# Patient Record
Sex: MALE | Race: WHITE | NOT HISPANIC OR LATINO | Employment: FULL TIME | URBAN - NONMETROPOLITAN AREA
[De-identification: names, ages, dates, MRNs, and addresses within clinical notes are randomized per-mention and may not be internally consistent; named-entity substitution may affect disease eponyms.]

---

## 2022-01-27 ENCOUNTER — APPOINTMENT (OUTPATIENT)
Dept: GENERAL RADIOLOGY | Facility: HOSPITAL | Age: 62
End: 2022-01-27

## 2022-01-27 ENCOUNTER — APPOINTMENT (OUTPATIENT)
Dept: CT IMAGING | Facility: HOSPITAL | Age: 62
End: 2022-01-27

## 2022-01-27 ENCOUNTER — HOSPITAL ENCOUNTER (EMERGENCY)
Facility: HOSPITAL | Age: 62
Discharge: HOME | End: 2022-01-27
Admitting: EMERGENCY MEDICINE

## 2022-01-27 VITALS
WEIGHT: 233 LBS | HEIGHT: 69 IN | DIASTOLIC BLOOD PRESSURE: 80 MMHG | SYSTOLIC BLOOD PRESSURE: 141 MMHG | RESPIRATION RATE: 18 BRPM | OXYGEN SATURATION: 97 % | BODY MASS INDEX: 34.51 KG/M2 | HEART RATE: 75 BPM | TEMPERATURE: 99 F

## 2022-01-27 DIAGNOSIS — J18.9 PNEUMONIA OF RIGHT UPPER LOBE DUE TO INFECTIOUS ORGANISM: ICD-10-CM

## 2022-01-27 DIAGNOSIS — R93.7 ABNORMAL CT SCAN, LUMBAR SPINE: ICD-10-CM

## 2022-01-27 DIAGNOSIS — M54.41 CHRONIC MIDLINE LOW BACK PAIN WITH RIGHT-SIDED SCIATICA: Primary | ICD-10-CM

## 2022-01-27 DIAGNOSIS — G89.29 CHRONIC MIDLINE LOW BACK PAIN WITH RIGHT-SIDED SCIATICA: Primary | ICD-10-CM

## 2022-01-27 LAB — SARS-COV-2 RNA PNL SPEC NAA+PROBE: DETECTED

## 2022-01-27 PROCEDURE — 96375 TX/PRO/DX INJ NEW DRUG ADDON: CPT

## 2022-01-27 PROCEDURE — 25010000002 ORPHENADRINE CITRATE PER 60 MG: Performed by: PHYSICIAN ASSISTANT

## 2022-01-27 PROCEDURE — 25010000002 MORPHINE PER 10 MG: Performed by: EMERGENCY MEDICINE

## 2022-01-27 PROCEDURE — 0 LIDOCAINE 1 % SOLUTION: Performed by: PHYSICIAN ASSISTANT

## 2022-01-27 PROCEDURE — 99284 EMERGENCY DEPT VISIT MOD MDM: CPT

## 2022-01-27 PROCEDURE — 87635 SARS-COV-2 COVID-19 AMP PRB: CPT | Performed by: PHYSICIAN ASSISTANT

## 2022-01-27 PROCEDURE — 72131 CT LUMBAR SPINE W/O DYE: CPT

## 2022-01-27 PROCEDURE — 71045 X-RAY EXAM CHEST 1 VIEW: CPT

## 2022-01-27 PROCEDURE — 96374 THER/PROPH/DIAG INJ IV PUSH: CPT

## 2022-01-27 PROCEDURE — 72128 CT CHEST SPINE W/O DYE: CPT

## 2022-01-27 PROCEDURE — 25010000002 ONDANSETRON PER 1 MG: Performed by: PHYSICIAN ASSISTANT

## 2022-01-27 PROCEDURE — 25010000002 DEXAMETHASONE PER 1 MG: Performed by: EMERGENCY MEDICINE

## 2022-01-27 RX ORDER — TRAMADOL HYDROCHLORIDE 50 MG/1
50 TABLET ORAL EVERY 12 HOURS
COMMUNITY
End: 2022-11-22

## 2022-01-27 RX ORDER — LIDOCAINE HYDROCHLORIDE 10 MG/ML
10 INJECTION, SOLUTION INFILTRATION; PERINEURAL ONCE
Status: DISCONTINUED | OUTPATIENT
Start: 2022-01-27 | End: 2022-01-27 | Stop reason: HOSPADM

## 2022-01-27 RX ORDER — DEXAMETHASONE SODIUM PHOSPHATE 4 MG/ML
4 INJECTION, SOLUTION INTRA-ARTICULAR; INTRALESIONAL; INTRAMUSCULAR; INTRAVENOUS; SOFT TISSUE ONCE
Status: COMPLETED | OUTPATIENT
Start: 2022-01-27 | End: 2022-01-27

## 2022-01-27 RX ORDER — SODIUM CHLORIDE 0.9 % (FLUSH) 0.9 %
10 SYRINGE (ML) INJECTION AS NEEDED
Status: DISCONTINUED | OUTPATIENT
Start: 2022-01-27 | End: 2022-01-27 | Stop reason: HOSPADM

## 2022-01-27 RX ORDER — GLIPIZIDE 5 MG/1
5 TABLET ORAL DAILY
COMMUNITY

## 2022-01-27 RX ORDER — AZITHROMYCIN 250 MG/1
TABLET, FILM COATED ORAL
Qty: 6 TABLET | Refills: 0 | Status: SHIPPED | OUTPATIENT
Start: 2022-01-27 | End: 2022-11-22

## 2022-01-27 RX ORDER — HYDROCODONE BITARTRATE AND ACETAMINOPHEN 10; 325 MG/1; MG/1
1 TABLET ORAL ONCE
Status: COMPLETED | OUTPATIENT
Start: 2022-01-27 | End: 2022-01-27

## 2022-01-27 RX ORDER — ATORVASTATIN CALCIUM 20 MG/1
20 TABLET, FILM COATED ORAL DAILY
COMMUNITY

## 2022-01-27 RX ORDER — LOSARTAN POTASSIUM 25 MG/1
25 TABLET ORAL DAILY
COMMUNITY

## 2022-01-27 RX ORDER — HYDROCHLOROTHIAZIDE 12.5 MG/1
12.5 TABLET ORAL DAILY
COMMUNITY

## 2022-01-27 RX ORDER — ONDANSETRON 2 MG/ML
4 INJECTION INTRAMUSCULAR; INTRAVENOUS ONCE
Status: COMPLETED | OUTPATIENT
Start: 2022-01-27 | End: 2022-01-27

## 2022-01-27 RX ORDER — TIZANIDINE 4 MG/1
4 TABLET ORAL EVERY 6 HOURS PRN
Qty: 12 TABLET | Refills: 0 | Status: SHIPPED | OUTPATIENT
Start: 2022-01-27 | End: 2022-11-22

## 2022-01-27 RX ORDER — GABAPENTIN 300 MG/1
300 CAPSULE ORAL 3 TIMES DAILY
COMMUNITY

## 2022-01-27 RX ORDER — LIDOCAINE 50 MG/G
1 PATCH TOPICAL EVERY 24 HOURS
Qty: 6 EACH | Refills: 0 | Status: SHIPPED | OUTPATIENT
Start: 2022-01-27 | End: 2022-11-22

## 2022-01-27 RX ORDER — ORPHENADRINE CITRATE 30 MG/ML
60 INJECTION INTRAMUSCULAR; INTRAVENOUS ONCE
Status: COMPLETED | OUTPATIENT
Start: 2022-01-27 | End: 2022-01-27

## 2022-01-27 RX ORDER — CLONIDINE HYDROCHLORIDE 0.1 MG/1
0.1 TABLET ORAL 2 TIMES DAILY PRN
COMMUNITY

## 2022-01-27 RX ADMIN — SODIUM CHLORIDE 500 ML: 9 INJECTION, SOLUTION INTRAVENOUS at 14:38

## 2022-01-27 RX ADMIN — DEXAMETHASONE SODIUM PHOSPHATE 4 MG: 4 INJECTION, SOLUTION INTRA-ARTICULAR; INTRALESIONAL; INTRAMUSCULAR; INTRAVENOUS; SOFT TISSUE at 14:38

## 2022-01-27 RX ADMIN — MORPHINE SULFATE 4 MG: 4 INJECTION, SOLUTION INTRAMUSCULAR; INTRAVENOUS at 14:37

## 2022-01-27 RX ADMIN — ONDANSETRON 4 MG: 2 INJECTION INTRAMUSCULAR; INTRAVENOUS at 14:37

## 2022-01-27 RX ADMIN — HYDROCODONE BITARTRATE AND ACETAMINOPHEN 1 TABLET: 10; 325 TABLET ORAL at 16:47

## 2022-01-27 RX ADMIN — ORPHENADRINE CITRATE 60 MG: 30 INJECTION INTRAMUSCULAR; INTRAVENOUS at 14:37

## 2022-04-05 ENCOUNTER — DOCUMENTATION (OUTPATIENT)
Dept: CT IMAGING | Facility: HOSPITAL | Age: 62
End: 2022-04-05

## 2022-07-11 ENCOUNTER — DOCUMENTATION (OUTPATIENT)
Dept: CT IMAGING | Facility: HOSPITAL | Age: 62
End: 2022-07-11

## 2022-07-12 ENCOUNTER — TELEPHONE (OUTPATIENT)
Dept: CT IMAGING | Facility: HOSPITAL | Age: 62
End: 2022-07-12

## 2022-07-12 NOTE — TELEPHONE ENCOUNTER
I called the [facility] in [place], [place] and left a message for someone to return my call. I need to touch base with them to see if any f/u imaging has been done. If not, send CXR report to a provider there.

## 2022-07-12 NOTE — TELEPHONE ENCOUNTER
Patient called in and we discussed the findings on CXR from Jan. He did not know the name of the provider that he sees at [facility] in [place], [place]. He said he was having surgery today and would get back to me in a couple of days.

## 2022-10-24 ENCOUNTER — HOSPITAL ENCOUNTER (OUTPATIENT)
Facility: HOSPITAL | Age: 62
Setting detail: SURGERY ADMIT
End: 2022-10-24
Attending: ORTHOPAEDIC SURGERY | Admitting: ORTHOPAEDIC SURGERY

## 2022-11-21 ENCOUNTER — TELEPHONE (OUTPATIENT)
Dept: VASCULAR SURGERY | Facility: CLINIC | Age: 62
End: 2022-11-21

## 2022-11-21 NOTE — TELEPHONE ENCOUNTER
Called to remind the patient of his appt tomorrow with Dr. Dyson.  I had to leave a message with Lelo.  I also gave her the address.

## 2022-11-22 ENCOUNTER — OFFICE VISIT (OUTPATIENT)
Dept: VASCULAR SURGERY | Facility: CLINIC | Age: 62
End: 2022-11-22

## 2022-11-22 ENCOUNTER — HOSPITAL ENCOUNTER (OUTPATIENT)
Dept: GENERAL RADIOLOGY | Facility: HOSPITAL | Age: 62
Discharge: HOME | End: 2022-11-22

## 2022-11-22 ENCOUNTER — PRE-ADMISSION TESTING (OUTPATIENT)
Dept: PREADMISSION TESTING | Facility: HOSPITAL | Age: 62
End: 2022-11-22

## 2022-11-22 VITALS
HEIGHT: 69 IN | BODY MASS INDEX: 31.48 KG/M2 | DIASTOLIC BLOOD PRESSURE: 92 MMHG | HEART RATE: 66 BPM | OXYGEN SATURATION: 100 % | WEIGHT: 212.52 LBS | SYSTOLIC BLOOD PRESSURE: 162 MMHG | RESPIRATION RATE: 16 BRPM

## 2022-11-22 VITALS
BODY MASS INDEX: 31.25 KG/M2 | OXYGEN SATURATION: 98 % | WEIGHT: 211 LBS | HEART RATE: 66 BPM | DIASTOLIC BLOOD PRESSURE: 82 MMHG | SYSTOLIC BLOOD PRESSURE: 118 MMHG

## 2022-11-22 DIAGNOSIS — G89.29 CHRONIC MIDLINE LOW BACK PAIN, UNSPECIFIED WHETHER SCIATICA PRESENT: Primary | ICD-10-CM

## 2022-11-22 DIAGNOSIS — M54.50 CHRONIC MIDLINE LOW BACK PAIN, UNSPECIFIED WHETHER SCIATICA PRESENT: Primary | ICD-10-CM

## 2022-11-22 LAB
ALBUMIN SERPL-MCNC: 4.6 G/DL (ref 3.5–5.2)
ALBUMIN/GLOB SERPL: 1.4 G/DL
ALP SERPL-CCNC: 28 U/L (ref 39–117)
ALT SERPL W P-5'-P-CCNC: 23 U/L (ref 1–41)
ANION GAP SERPL CALCULATED.3IONS-SCNC: 10 MMOL/L (ref 5–15)
APTT PPP: 38.1 SECONDS (ref 24.1–35)
AST SERPL-CCNC: 21 U/L (ref 1–40)
BILIRUB SERPL-MCNC: 0.4 MG/DL (ref 0–1.2)
BILIRUB UR QL STRIP: NEGATIVE
BUN SERPL-MCNC: 23 MG/DL (ref 8–23)
BUN/CREAT SERPL: 20.7 (ref 7–25)
CALCIUM SPEC-SCNC: 10 MG/DL (ref 8.6–10.5)
CHLORIDE SERPL-SCNC: 102 MMOL/L (ref 98–107)
CLARITY UR: CLEAR
CO2 SERPL-SCNC: 28 MMOL/L (ref 22–29)
COLOR UR: YELLOW
CREAT SERPL-MCNC: 1.11 MG/DL (ref 0.76–1.27)
DEPRECATED RDW RBC AUTO: 43.5 FL (ref 37–54)
EGFRCR SERPLBLD CKD-EPI 2021: 75.5 ML/MIN/1.73
ERYTHROCYTE [DISTWIDTH] IN BLOOD BY AUTOMATED COUNT: 12.4 % (ref 12.3–15.4)
GLOBULIN UR ELPH-MCNC: 3.3 GM/DL
GLUCOSE SERPL-MCNC: 140 MG/DL (ref 65–99)
GLUCOSE UR STRIP-MCNC: NEGATIVE MG/DL
HCT VFR BLD AUTO: 44.9 % (ref 37.5–51)
HGB BLD-MCNC: 14.8 G/DL (ref 13–17.7)
HGB UR QL STRIP.AUTO: NEGATIVE
INR PPP: 1 (ref 0.91–1.09)
KETONES UR QL STRIP: NEGATIVE
LEUKOCYTE ESTERASE UR QL STRIP.AUTO: NEGATIVE
MCH RBC QN AUTO: 31.6 PG (ref 26.6–33)
MCHC RBC AUTO-ENTMCNC: 33 G/DL (ref 31.5–35.7)
MCV RBC AUTO: 95.7 FL (ref 79–97)
NITRITE UR QL STRIP: NEGATIVE
PH UR STRIP.AUTO: 5.5 [PH] (ref 5–8)
PLATELET # BLD AUTO: 297 10*3/MM3 (ref 140–450)
PMV BLD AUTO: 10.9 FL (ref 6–12)
POTASSIUM SERPL-SCNC: 4.6 MMOL/L (ref 3.5–5.2)
PROT SERPL-MCNC: 7.9 G/DL (ref 6–8.5)
PROT UR QL STRIP: NEGATIVE
PROTHROMBIN TIME: 12.8 SECONDS (ref 11.9–14.6)
RBC # BLD AUTO: 4.69 10*6/MM3 (ref 4.14–5.8)
SODIUM SERPL-SCNC: 140 MMOL/L (ref 136–145)
SP GR UR STRIP: 1.02 (ref 1–1.03)
UROBILINOGEN UR QL STRIP: NORMAL
WBC NRBC COR # BLD: 9.79 10*3/MM3 (ref 3.4–10.8)

## 2022-11-22 PROCEDURE — 93005 ELECTROCARDIOGRAM TRACING: CPT

## 2022-11-22 PROCEDURE — 85027 COMPLETE CBC AUTOMATED: CPT

## 2022-11-22 PROCEDURE — 99204 OFFICE O/P NEW MOD 45 MIN: CPT | Performed by: SURGERY

## 2022-11-22 PROCEDURE — 36415 COLL VENOUS BLD VENIPUNCTURE: CPT

## 2022-11-22 PROCEDURE — 80053 COMPREHEN METABOLIC PANEL: CPT

## 2022-11-22 PROCEDURE — 71045 X-RAY EXAM CHEST 1 VIEW: CPT

## 2022-11-22 PROCEDURE — 93010 ELECTROCARDIOGRAM REPORT: CPT | Performed by: INTERNAL MEDICINE

## 2022-11-22 PROCEDURE — 85610 PROTHROMBIN TIME: CPT

## 2022-11-22 PROCEDURE — 85730 THROMBOPLASTIN TIME PARTIAL: CPT

## 2022-11-22 PROCEDURE — 81003 URINALYSIS AUTO W/O SCOPE: CPT

## 2022-11-22 RX ORDER — ASPIRIN 81 MG/1
81 TABLET ORAL DAILY
COMMUNITY

## 2022-11-22 NOTE — PROGRESS NOTES
"2022       CHARITO Garcia MD  [address] [place] [id number]    Manuel Moscoso  1960    Chief Complaint   Patient presents with   • NEW PATIENT     KIRBY/CARLITO. (22)       Dear CHARITO Garcia MD:      HPI  I had the pleasure of seeing your patient Manuel Moscoso in the office today.  Thank you kindly for this consultation.  As you recall, Manuel Moscoso is a 62 y.o.  male who you are currently following for chronic back pain.  Manuel Chaparro scheduled for anterior lumbar interbody fusion of L5-S1 with Dr. Garcia on 22.  The patient denies any history of DVT. He has had 2 previous back surgeries.     Past Medical History:   Diagnosis Date   • Allergy to alpha-gal    • Arthritis    • Diabetes mellitus (HCC)    • Hearing loss, left    • History of staph infection    • Hypertension    • Low back pain    • Psoriasis    • Sleep apnea    • Tinnitus    • Vision disturbance     using rx eyedrops       Past Surgical History:   Procedure Laterality Date   • BACK SURGERY      x 2   • CHOLECYSTECTOMY     • COLONOSCOPY     • GANGLION CYST EXCISION Left     wrist   • LIPOMA EXCISION      back       History reviewed. No pertinent family history.    Social History     Socioeconomic History   • Marital status:    Tobacco Use   • Smoking status: Former     Types: Cigarettes     Quit date:      Years since quittin.9   • Smokeless tobacco: Never   Vaping Use   • Vaping Use: Never used   Substance and Sexual Activity   • Alcohol use: Yes     Comment: rarely   • Drug use: Never       Allergies   Allergen Reactions   • Alpha-Gal Nausea And Vomiting   • Penicillins Unknown - Low Severity     STATES HE \"HAS NO IDEA\" AND THAT IT WAS A CHILDHOOD ALLERGY       Current Outpatient Medications   Medication Instructions   • aspirin 81 mg, Oral, Daily   • atorvastatin (LIPITOR) 20 mg, Oral, Daily   • cloNIDine (CATAPRES) 0.1 mg, Oral, 3 Times Daily   • etanercept (ENBREL) 50 " mg, Subcutaneous, Weekly   • gabapentin (NEURONTIN) 300 mg, Oral, 3 Times Daily   • glipizide (GLUCOTROL) 5 mg, Oral, Daily   • hydroCHLOROthiazide (HYDRODIURIL) 12.5 mg, Oral, Daily   • losartan (COZAAR) 25 mg, Oral, Daily   • metFORMIN (GLUCOPHAGE) 1,000 mg, Oral, 2 Times Daily With Meals         Review of Systems   Constitutional: Negative.    HENT: Negative.    Eyes: Negative.    Respiratory: Negative.    Cardiovascular: Negative.    Gastrointestinal: Negative.    Endocrine: Negative.    Genitourinary: Negative.    Musculoskeletal: Positive for back pain.   Skin: Negative.    Allergic/Immunologic: Negative.    Neurological: Negative.    Hematological: Negative.    Psychiatric/Behavioral: Negative.    All other systems reviewed and are negative.      /82   Pulse 66   Wt 95.7 kg (211 lb)   SpO2 98%   BMI 31.25 kg/m²   Physical Exam  Vitals and nursing note reviewed.   Constitutional:       Appearance: Normal appearance. He is well-developed. He is obese.   HENT:      Head: Normocephalic and atraumatic.   Eyes:      General: No scleral icterus.     Pupils: Pupils are equal, round, and reactive to light.   Neck:      Thyroid: No thyromegaly.      Vascular: No carotid bruit or JVD.   Cardiovascular:      Rate and Rhythm: Normal rate and regular rhythm.      Pulses:           Carotid pulses are 2+ on the right side and 2+ on the left side.       Femoral pulses are 2+ on the right side and 2+ on the left side.       Popliteal pulses are 2+ on the right side and 2+ on the left side.        Dorsalis pedis pulses are 2+ on the right side and 2+ on the left side.        Posterior tibial pulses are 2+ on the right side and 2+ on the left side.      Heart sounds: Normal heart sounds.   Pulmonary:      Effort: Pulmonary effort is normal.      Breath sounds: Normal breath sounds.   Abdominal:      General: Bowel sounds are normal. There is no distension or abdominal bruit.      Palpations: Abdomen is soft. There is  no mass.      Tenderness: There is no abdominal tenderness.   Musculoskeletal:         General: Normal range of motion.      Cervical back: Neck supple.      Comments: Lumbar pain   Lymphadenopathy:      Cervical: No cervical adenopathy.   Skin:     General: Skin is warm and dry.   Neurological:      Mental Status: He is alert and oriented to person, place, and time.      Cranial Nerves: No cranial nerve deficit.      Sensory: No sensory deficit.   Psychiatric:         Mood and Affect: Mood normal.         Behavior: Behavior normal.         Thought Content: Thought content normal.         Judgment: Judgment normal.         XR Chest 1 View    Result Date: 11/22/2022  Narrative: EXAMINATION:  XR CHEST 1 VW-  11/22/2022 9:19 AM CST  HISTORY: Preadmission testing. Hypertension.  COMPARISON: 1/27/2022.  TECHNIQUE: Single view AP image.  FINDINGS:  The lungs are expanded bilaterally and clear. The pleural spaces are clear. Heart size is normal. There are degenerative changes of the spine and shoulders. No acute bony abnormality is seen.      Impression: No active disease is seen.   This report was finalized on 11/22/2022 09:38 by Dr. Luis Duncan MD.      There is no problem list on file for this patient.        ICD-10-CM ICD-9-CM   1. Chronic midline low back pain, unspecified whether sciatica present  M54.50 724.2    G89.29 338.29         Plan: After thoroughly evaluating Manuel Moscoso, I believe the best course of action is to proceed with anterior lumbar interbody fusion of L5-S1.  Risks of ALIF were discussed and include, but are not limited to, bleeding, infection, nerve damage, vessel damage, retrograde ejaculation, bowel damage, ureteral damage, DVT, MI, stroke, and death.  The patient understands these risks and wishes to proceed with procedure.  The patient can continue taking their current medication regimen as previously planned.  This was all discussed in full with complete understanding.    Thank you  for allowing me to participate in the care of your patient.  Please do not hesitate with any questions or concerns.  I will keep you aware of any further encounters with Manuel Moscoso.        Sincerely yours,         Simba Dyson, DO

## 2022-11-22 NOTE — DISCHARGE INSTRUCTIONS
Before you come to the hospital        Arrival time: AS DIRECTED BY OFFICE     YOU MAY TAKE THE FOLLOWING MEDICATION(S) THE MORNING OF SURGERY WITH A SIP OF WATER: Gabapentin    ***Hold Losartan for 24 hours prior to surgery***           ALL OTHER HOME MEDICATION CHECK WITH YOUR PHYSICIAN (especially if you are taking diabetes medicines or blood thinners)    Do not take any Erectile Dysfunction medications (EX: CIALIS, VIAGRA) 24 hours prior to surgery.      If you were given and instructed to use a germ- killing soap, use as directed the night before surgery and again the morning of surgery or as directed by your surgeon.    (See attached information for How to Use Chlorhexidine for Bathing if applicable.)            Eating and drinking restrictions prior to scheduled arrival time    2 Hours before arrival time STOP   Drinking Clear liquids (water, apple juice-no pulp)     6 Hours before arrival time STOP   Milk or drinks that contain milk, full liquids    6 Hours before arrival time STOP   Light meals or foods, such as toast or cereal    8 Hours before arrival time STOP   Heavy foods, such as meat, fried foods, or fatty foods    (It is extremely important that you follow these guidelines to prevent delay or cancelation of your procedure)     Clear Liquids  Water and flavored water                                                                      Clear Fruit juices, such as cranberry juice and apple juice.  Black coffee (NO cream of any kind, including powdered).  Plain tea  Clear bouillon or broth.  Flavored gelatin.  Soda.  Gatorade or Powerade.  Full liquid examples  Juices that have pulp.  Frozen ice pops that contain fruit pieces.  Coffee with creamer  Milk.  Yogurt.                MANAGING PAIN AFTER SURGERY    We know you are probably wondering what your pain will be like after surgery.  Following surgery it is unrealistic to expect you will not have pain.   Pain is how our bodies let us know that  something is wrong or cautions us to be careful.  That said, our goal is to make your pain tolerable.    Methods we may use to treat your pain include (oral or IV medications, PCAs, epidurals, nerve blocks, etc.)   While some procedures require IV pain medications for a short time after surgery, transitioning to pain medications by mouth allows for better management of pain.   Your nurse will encourage you to take oral pain medications whenever possible.  IV medications work almost immediately, but only last a short while.  Taking medications by mouth allows for a more constant level of medication in your blood stream for a longer period of time.      Once your pain is out of control it is harder to get back under control.  It is important you are aware when your next dose of pain medication is due.  If you are admitted, your nurse may write the time of your next dose on the white board in your room to help you remember.      We are interested in your pain and encourage you to inform us about aggravating factors during your visit.   Many times a simple repositioning every few hours can make a big difference.    If your physician says it is okay, do not let your pain prevent you from getting out of bed. Be sure to call your nurse for assistance prior to getting up so you do not fall.      Before surgery, please decide your tolerable pain goal.  These faces help describe the pain ratings we use on a 0-10 scale.   Be prepared to tell us your goal and whether or not you take pain or anxiety medications at home.          Preparing for Surgery  Preparing for surgery is an important part of your care. It can make things go more smoothly and help you avoid complications. The steps leading up to surgery may vary among hospitals. Follow all instructions given to you by your health care providers. Ask questions if you do not understand something. Talk about any concerns that you have.  Here are some questions to consider  asking before your surgery:  If my surgery is not an emergency (is elective), when would be the best time to have the surgery?  What arrangements do I need to make for work, home, or school?  What will my recovery be like? How long will it be before I can return to normal activities?  Will I need to prepare my home? Will I need to arrange care for me or my children?  Should I expect to have pain after surgery? What are my pain management options? Are there nonmedical options that I can try for pain?  Tell a health care provider about:  Any allergies you have.  All medicines you are taking, including vitamins, herbs, eye drops, creams, and over-the-counter medicines.  Any problems you or family members have had with anesthetic medicines.  Any blood disorders you have.  Any surgeries you have had.  Any medical conditions you have.  Whether you are pregnant or may be pregnant.  What are the risks?  The risks and complications of surgery depend on the specific procedure that you have. Discuss all the risks with your health care providers before your surgery. Ask about common surgical complications, which may include:  Infection.  Bleeding or a need for blood replacement (transfusion).  Allergic reactions to medicines.  Damage to surrounding nerves, tissues, or structures.  A blood clot.  Scarring.  Failure of the surgery to correct the problem.  Follow these instructions before the procedure:  Several days or weeks before your procedure  You may have a physical exam by your primary health care provider to make sure it is safe for you to have surgery.  You may have testing. This may include a chest X-ray, blood and urine tests, electrocardiogram (ECG), or other testing.  Ask your health care provider about:  Changing or stopping your regular medicines. This is especially important if you are taking diabetes medicines or blood thinners.  Taking medicines such as aspirin and ibuprofen. These medicines can thin your blood.  Do not take these medicines unless your health care provider tells you to take them.  Taking over-the-counter medicines, vitamins, herbs, and supplements.  Do not use any products that contain nicotine or tobacco, such as cigarettes and e-cigarettes. If you need help quitting, ask your health care provider.  Avoid alcohol.  Ask your health care provider if there are exercises you can do to prepare for surgery.  Eat a healthy diet.   Plan to have someone take you home from the hospital or clinic.  Plan to have a responsible adult care for you for at least 24 hours after you leave the hospital or clinic. This is important.  The day before your procedure  You may be given antibiotic medicine to take by mouth to help prevent infection. Take it as told by your health care provider.  You may be asked to shower with a germ-killing soap.  Follow instructions from your health care provider about eating and drinking restrictions. This includes gum, mints and hard candy.  Pack comfortable clothes according to your procedure.   The day of your procedure  You may need to take another shower with a germ-killing soap before you leave home in the morning.  With a small sip of water, take only the medicines that you are told to take.  Remove all jewelry including rings.   Leave anything you consider valuable at home except hearing aids if needed.  Do not wear any makeup, nail polish, powder, deodorant, lotion, hair accessories, or anything on your skin or body except your clothes.  If you will be staying in the hospital, bring a case to hold your glasses, contacts, or dentures. You may also want to bring your robe and non-skid footwear.  If you wear oxygen at home, bring it with you the day of surgery.  If instructed by your health care provider, bring your sleep apnea device with you on the day of your surgery (if this applies to you).  You may want to leave your suitcase and sleep apnea device in the car until after surgery.    Arrive at the hospital as scheduled.  Bring a friend or family member with you who can help to answer questions and be present while you meet with your health care provider.  At the hospital  When you arrive at the hospital:  Go to registration located at the main entrance of the hospital. You will be registered and given a beeper and a sticker sheet. Take the stickers to the Outpatient nurses desk and place in the black tray. This is to notify staff that you have arrived. Then return to the lobby to wait.   When your beeper lights up and vibrates proceed through the double doors, under the stairs, and a member of the Outpatient Surgery staff will escort you to your preoperative room.  You may have to wear compression sleeves. These help to prevent blood clots and reduce swelling in your legs.  An IV may be inserted into one of your veins.              In the operating room, you may be given one or more of the following:        A medicine to help you relax (sedative).        A medicine to numb the area (local anesthetic).        A medicine to make you fall asleep (general anesthetic).        A medicine that is injected into an area of your body to numb everything below the                      injection site (regional anesthetic).  You may be given an antibiotic through your IV to help prevent infection.  Your surgical site will be marked or identified.    Contact a health care provider if you:  Develop a fever of more than 100.4°F (38°C) or other feelings of illness during the 48 hours before your surgery.  Have symptoms that get worse.  Have questions or concerns about your surgery.  Summary  Preparing for surgery can make the procedure go more smoothly and lower your risk of complications.  Before surgery, make a list of questions and concerns to discuss with your surgeon. Ask about the risks and possible complications.  In the days or weeks before your surgery, follow all instructions from your health care  provider. You may need to stop smoking, avoid alcohol, follow eating restrictions, and change or stop your regular medicines.  Contact your surgeon if you develop a fever or other signs of illness during the few days before your surgery.  This information is not intended to replace advice given to you by your health care provider. Make sure you discuss any questions you have with your health care provider.  Document Revised: 12/21/2018 Document Reviewed: 10/23/2018  [employer]vier Patient Education © 2021 [employer]

## 2022-11-24 LAB
QT INTERVAL: 442 MS
QTC INTERVAL: 444 MS

## 2022-12-18 ENCOUNTER — ANESTHESIA EVENT (OUTPATIENT)
Dept: PERIOP | Facility: HOSPITAL | Age: 62
DRG: 455 | End: 2022-12-18
Payer: COMMERCIAL

## 2022-12-19 ENCOUNTER — ANESTHESIA (OUTPATIENT)
Dept: PERIOP | Facility: HOSPITAL | Age: 62
DRG: 455 | End: 2022-12-19
Payer: COMMERCIAL

## 2022-12-19 ENCOUNTER — APPOINTMENT (OUTPATIENT)
Dept: GENERAL RADIOLOGY | Facility: HOSPITAL | Age: 62
DRG: 455 | End: 2022-12-19
Payer: COMMERCIAL

## 2022-12-19 ENCOUNTER — HOSPITAL ENCOUNTER (INPATIENT)
Facility: HOSPITAL | Age: 62
LOS: 5 days | Discharge: HOME | DRG: 455 | End: 2022-12-24
Attending: ORTHOPAEDIC SURGERY | Admitting: ORTHOPAEDIC SURGERY
Payer: COMMERCIAL

## 2022-12-19 ENCOUNTER — ANESTHESIA EVENT (OUTPATIENT)
Dept: PERIOP | Facility: HOSPITAL | Age: 62
DRG: 455 | End: 2022-12-19
Payer: COMMERCIAL

## 2022-12-19 DIAGNOSIS — M54.50 CHRONIC BILATERAL LOW BACK PAIN WITHOUT SCIATICA: ICD-10-CM

## 2022-12-19 DIAGNOSIS — M51.9 LUMBOSACRAL DISC DISEASE: ICD-10-CM

## 2022-12-19 DIAGNOSIS — M54.40 LUMBAGO OF MULTIPLE SITES IN SPINE WITH SCIATICA: Primary | ICD-10-CM

## 2022-12-19 DIAGNOSIS — G89.29 CHRONIC BILATERAL LOW BACK PAIN WITHOUT SCIATICA: ICD-10-CM

## 2022-12-19 DIAGNOSIS — Z78.9 DECREASED ACTIVITIES OF DAILY LIVING (ADL): ICD-10-CM

## 2022-12-19 DIAGNOSIS — Z74.09 IMPAIRED MOBILITY: ICD-10-CM

## 2022-12-19 PROBLEM — I10 ESSENTIAL HYPERTENSION: Chronic | Status: ACTIVE | Noted: 2022-12-19

## 2022-12-19 PROBLEM — E11.65 TYPE 2 DIABETES MELLITUS WITH HYPERGLYCEMIA, WITHOUT LONG-TERM CURRENT USE OF INSULIN: Status: ACTIVE | Noted: 2022-12-19

## 2022-12-19 PROBLEM — K59.03 DRUG-INDUCED CONSTIPATION: Status: ACTIVE | Noted: 2022-12-19

## 2022-12-19 LAB
ABO GROUP BLD: NORMAL
BLD GP AB SCN SERPL QL: NEGATIVE
GLUCOSE BLDC GLUCOMTR-MCNC: 106 MG/DL (ref 70–130)
GLUCOSE BLDC GLUCOMTR-MCNC: 139 MG/DL (ref 70–130)
RH BLD: POSITIVE
T&S EXPIRATION DATE: NORMAL

## 2022-12-19 PROCEDURE — 76000 FLUOROSCOPY <1 HR PHYS/QHP: CPT

## 2022-12-19 PROCEDURE — C1713 ANCHOR/SCREW BN/BN,TIS/BN: HCPCS | Performed by: ORTHOPAEDIC SURGERY

## 2022-12-19 PROCEDURE — 4A11X4G MONITORING OF PERIPHERAL NERVOUS ELECTRICAL ACTIVITY, INTRAOPERATIVE, EXTERNAL APPROACH: ICD-10-PCS | Performed by: ORTHOPAEDIC SURGERY

## 2022-12-19 PROCEDURE — 97165 OT EVAL LOW COMPLEX 30 MIN: CPT | Performed by: OCCUPATIONAL THERAPIST

## 2022-12-19 PROCEDURE — 82962 GLUCOSE BLOOD TEST: CPT

## 2022-12-19 PROCEDURE — 25010000002 MIDAZOLAM PER 1 MG: Performed by: ANESTHESIOLOGY

## 2022-12-19 PROCEDURE — 01NB0ZZ RELEASE LUMBAR NERVE, OPEN APPROACH: ICD-10-PCS | Performed by: ORTHOPAEDIC SURGERY

## 2022-12-19 PROCEDURE — 25010000002 DEXAMETHASONE PER 1 MG: Performed by: NURSE ANESTHETIST, CERTIFIED REGISTERED

## 2022-12-19 PROCEDURE — 0SG30A0 FUSION OF LUMBOSACRAL JOINT WITH INTERBODY FUSION DEVICE, ANTERIOR APPROACH, ANTERIOR COLUMN, OPEN APPROACH: ICD-10-PCS | Performed by: ORTHOPAEDIC SURGERY

## 2022-12-19 PROCEDURE — 25010000002 FENTANYL CITRATE (PF) 250 MCG/5ML SOLUTION: Performed by: NURSE ANESTHETIST, CERTIFIED REGISTERED

## 2022-12-19 PROCEDURE — 0SB40ZZ EXCISION OF LUMBOSACRAL DISC, OPEN APPROACH: ICD-10-PCS | Performed by: ORTHOPAEDIC SURGERY

## 2022-12-19 PROCEDURE — 86900 BLOOD TYPING SEROLOGIC ABO: CPT | Performed by: ORTHOPAEDIC SURGERY

## 2022-12-19 PROCEDURE — 25010000002 PROPOFOL 10 MG/ML EMULSION: Performed by: NURSE ANESTHETIST, CERTIFIED REGISTERED

## 2022-12-19 PROCEDURE — 22558 ARTHRD ANT NTRBD MIN DSC LUM: CPT | Performed by: SURGERY

## 2022-12-19 PROCEDURE — 25010000002 FENTANYL CITRATE (PF) 100 MCG/2ML SOLUTION: Performed by: NURSE ANESTHETIST, CERTIFIED REGISTERED

## 2022-12-19 PROCEDURE — 74018 RADEX ABDOMEN 1 VIEW: CPT

## 2022-12-19 PROCEDURE — 72100 X-RAY EXAM L-S SPINE 2/3 VWS: CPT

## 2022-12-19 PROCEDURE — 25010000002 CEFAZOLIN PER 500 MG: Performed by: ORTHOPAEDIC SURGERY

## 2022-12-19 PROCEDURE — 25010000002 ONDANSETRON PER 1 MG: Performed by: NURSE ANESTHETIST, CERTIFIED REGISTERED

## 2022-12-19 PROCEDURE — C1765 ADHESION BARRIER: HCPCS | Performed by: ORTHOPAEDIC SURGERY

## 2022-12-19 PROCEDURE — 86901 BLOOD TYPING SEROLOGIC RH(D): CPT | Performed by: ORTHOPAEDIC SURGERY

## 2022-12-19 PROCEDURE — 0WJH0ZZ INSPECTION OF RETROPERITONEUM, OPEN APPROACH: ICD-10-PCS | Performed by: SURGERY

## 2022-12-19 PROCEDURE — 97162 PT EVAL MOD COMPLEX 30 MIN: CPT

## 2022-12-19 PROCEDURE — 86850 RBC ANTIBODY SCREEN: CPT | Performed by: ORTHOPAEDIC SURGERY

## 2022-12-19 DEVICE — ALIF SCREW, 6.0MM X 30MM
Type: IMPLANTABLE DEVICE | Site: SPINE LUMBAR | Status: FUNCTIONAL
Brand: ASPIDA

## 2022-12-19 DEVICE — LIGACLIP MCA MULTIPLE CLIP APPLIERS, 20 SMALL CLIPS
Type: IMPLANTABLE DEVICE | Site: ABDOMEN | Status: FUNCTIONAL
Brand: LIGACLIP

## 2022-12-19 DEVICE — SCRW BRIGADE XLRF 4.5X25MM: Type: IMPLANTABLE DEVICE | Site: SPINE LUMBAR | Status: FUNCTIONAL

## 2022-12-19 DEVICE — IMPLANTABLE DEVICE: Type: IMPLANTABLE DEVICE | Site: SPINE LUMBAR | Status: FUNCTIONAL

## 2022-12-19 DEVICE — HEMOST ABS SURGIFOAM SZ100 8X12 10MM: Type: IMPLANTABLE DEVICE | Site: SPINE LUMBAR | Status: FUNCTIONAL

## 2022-12-19 DEVICE — VERSAWRAP IS AN ABSORBABLE IMPLANT (DEVICE), DESIGNED TO SERVE AS AN INTERFACE BETWEEN TARGET TISSUES AND SURROUNDING TISSUES TO PROVIDE A NON-CONSTRICTING, PROTECTIVE ENCASEMENT. VERSAWRAP CONSISTS OF A CLEAR SHEET AND A WETTING SOLUTION. THE CLEAR SHEET IS A THIN MEMBRANE OF CROSSLINKED CALCIUM ALGINATE AND GLYCOSAMINOGLYCAN. VERSAWRAP SHEET IS EASY TO HANDLE, CONFORMABLE, AND IS DESIGNED FOR PLACEMENT UNDER, AROUND, OR OVER INJURED TISSUES AND/OR SURROUNDING TISSUES. VERSAWRAP SHEET IS SUPPLIED STERILE, NON-PYROGENIC, FOR SINGLE USE, IN DOUBLE PEEL POUCHES. THE VERSAWRAP SOLUTION IS APPLIED TO THE SHEET TO RENDER THE SHEET A GELATINOUS, TISSUE ADHERENT LAYER (GEL IN SITU). THE AQUEOUS CITRATE SOLUTION IS PROVIDED STERILE, NON-PYROGENIC, FOR SINGLE USE, IN A DROPPER, PACKAGED IN A DOUBLE PEEL POUCH.
Type: IMPLANTABLE DEVICE | Site: SPINE LUMBAR | Status: FUNCTIONAL
Brand: VERSAWRAP

## 2022-12-19 DEVICE — PUTTY GRFT BONE CATALYST NANOSYNTHETIC 10CC: Type: IMPLANTABLE DEVICE | Site: SPINE LUMBAR | Status: FUNCTIONAL

## 2022-12-19 DEVICE — KT HEMOST ABS SURGIFOAM PORCN 1GRAM: Type: IMPLANTABLE DEVICE | Site: SPINE LUMBAR | Status: FUNCTIONAL

## 2022-12-19 DEVICE — LIGACLIP MCA MULTIPLE CLIP APPLIERS, 30 MEDIUM CLIPS
Type: IMPLANTABLE DEVICE | Site: ABDOMEN | Status: FUNCTIONAL
Brand: LIGACLIP

## 2022-12-19 DEVICE — 16MM SACRAL PLATE
Type: IMPLANTABLE DEVICE | Site: SPINE LUMBAR | Status: FUNCTIONAL
Brand: ASPIDA

## 2022-12-19 RX ORDER — SODIUM CHLORIDE, SODIUM LACTATE, POTASSIUM CHLORIDE, CALCIUM CHLORIDE 600; 310; 30; 20 MG/100ML; MG/100ML; MG/100ML; MG/100ML
100 INJECTION, SOLUTION INTRAVENOUS CONTINUOUS
Status: DISCONTINUED | OUTPATIENT
Start: 2022-12-19 | End: 2022-12-19

## 2022-12-19 RX ORDER — FLUMAZENIL 0.1 MG/ML
0.2 INJECTION INTRAVENOUS AS NEEDED
Status: DISCONTINUED | OUTPATIENT
Start: 2022-12-19 | End: 2022-12-19 | Stop reason: HOSPADM

## 2022-12-19 RX ORDER — FENTANYL CITRATE 50 UG/ML
INJECTION, SOLUTION INTRAMUSCULAR; INTRAVENOUS AS NEEDED
Status: DISCONTINUED | OUTPATIENT
Start: 2022-12-19 | End: 2022-12-19 | Stop reason: SURG

## 2022-12-19 RX ORDER — SODIUM CHLORIDE, SODIUM LACTATE, POTASSIUM CHLORIDE, CALCIUM CHLORIDE 600; 310; 30; 20 MG/100ML; MG/100ML; MG/100ML; MG/100ML
1000 INJECTION, SOLUTION INTRAVENOUS CONTINUOUS
Status: DISCONTINUED | OUTPATIENT
Start: 2022-12-19 | End: 2022-12-19

## 2022-12-19 RX ORDER — SODIUM CHLORIDE 0.9 % (FLUSH) 0.9 %
3 SYRINGE (ML) INJECTION EVERY 12 HOURS SCHEDULED
Status: DISCONTINUED | OUTPATIENT
Start: 2022-12-19 | End: 2022-12-24 | Stop reason: HOSPADM

## 2022-12-19 RX ORDER — SODIUM CHLORIDE 9 MG/ML
40 INJECTION, SOLUTION INTRAVENOUS AS NEEDED
Status: DISCONTINUED | OUTPATIENT
Start: 2022-12-19 | End: 2022-12-19 | Stop reason: HOSPADM

## 2022-12-19 RX ORDER — PROPOFOL 10 MG/ML
VIAL (ML) INTRAVENOUS AS NEEDED
Status: DISCONTINUED | OUTPATIENT
Start: 2022-12-19 | End: 2022-12-19 | Stop reason: SURG

## 2022-12-19 RX ORDER — SODIUM CHLORIDE 0.9 % (FLUSH) 0.9 %
10 SYRINGE (ML) INJECTION EVERY 12 HOURS SCHEDULED
Status: DISCONTINUED | OUTPATIENT
Start: 2022-12-19 | End: 2022-12-19 | Stop reason: HOSPADM

## 2022-12-19 RX ORDER — ONDANSETRON 2 MG/ML
4 INJECTION INTRAMUSCULAR; INTRAVENOUS EVERY 6 HOURS PRN
Status: DISCONTINUED | OUTPATIENT
Start: 2022-12-19 | End: 2022-12-24 | Stop reason: HOSPADM

## 2022-12-19 RX ORDER — LIDOCAINE HYDROCHLORIDE 10 MG/ML
0.5 INJECTION, SOLUTION EPIDURAL; INFILTRATION; INTRACAUDAL; PERINEURAL ONCE AS NEEDED
Status: DISCONTINUED | OUTPATIENT
Start: 2022-12-19 | End: 2022-12-19 | Stop reason: HOSPADM

## 2022-12-19 RX ORDER — SODIUM CHLORIDE 0.9 % (FLUSH) 0.9 %
3 SYRINGE (ML) INJECTION EVERY 12 HOURS SCHEDULED
Status: DISCONTINUED | OUTPATIENT
Start: 2022-12-19 | End: 2022-12-19 | Stop reason: HOSPADM

## 2022-12-19 RX ORDER — LABETALOL HYDROCHLORIDE 5 MG/ML
5 INJECTION, SOLUTION INTRAVENOUS
Status: DISCONTINUED | OUTPATIENT
Start: 2022-12-19 | End: 2022-12-19 | Stop reason: HOSPADM

## 2022-12-19 RX ORDER — GLIPIZIDE 5 MG/1
5 TABLET ORAL DAILY
Status: DISCONTINUED | OUTPATIENT
Start: 2022-12-19 | End: 2022-12-20

## 2022-12-19 RX ORDER — DROPERIDOL 2.5 MG/ML
0.62 INJECTION, SOLUTION INTRAMUSCULAR; INTRAVENOUS ONCE AS NEEDED
Status: DISCONTINUED | OUTPATIENT
Start: 2022-12-19 | End: 2022-12-19 | Stop reason: HOSPADM

## 2022-12-19 RX ORDER — HYDROMORPHONE HYDROCHLORIDE 1 MG/ML
0.5 INJECTION, SOLUTION INTRAMUSCULAR; INTRAVENOUS; SUBCUTANEOUS
Status: DISCONTINUED | OUTPATIENT
Start: 2022-12-19 | End: 2022-12-19 | Stop reason: HOSPADM

## 2022-12-19 RX ORDER — SODIUM CHLORIDE 0.9 % (FLUSH) 0.9 %
3 SYRINGE (ML) INJECTION AS NEEDED
Status: DISCONTINUED | OUTPATIENT
Start: 2022-12-19 | End: 2022-12-19 | Stop reason: HOSPADM

## 2022-12-19 RX ORDER — MAGNESIUM HYDROXIDE 1200 MG/15ML
LIQUID ORAL AS NEEDED
Status: DISCONTINUED | OUTPATIENT
Start: 2022-12-19 | End: 2022-12-19 | Stop reason: HOSPADM

## 2022-12-19 RX ORDER — SODIUM CHLORIDE 9 MG/ML
INJECTION, SOLUTION INTRAVENOUS AS NEEDED
Status: DISCONTINUED | OUTPATIENT
Start: 2022-12-19 | End: 2022-12-19 | Stop reason: HOSPADM

## 2022-12-19 RX ORDER — ONDANSETRON 2 MG/ML
INJECTION INTRAMUSCULAR; INTRAVENOUS AS NEEDED
Status: DISCONTINUED | OUTPATIENT
Start: 2022-12-19 | End: 2022-12-19 | Stop reason: SURG

## 2022-12-19 RX ORDER — GABAPENTIN 300 MG/1
300 CAPSULE ORAL 3 TIMES DAILY
Status: DISCONTINUED | OUTPATIENT
Start: 2022-12-19 | End: 2022-12-24 | Stop reason: HOSPADM

## 2022-12-19 RX ORDER — SODIUM CHLORIDE 0.9 % (FLUSH) 0.9 %
3-10 SYRINGE (ML) INJECTION AS NEEDED
Status: DISCONTINUED | OUTPATIENT
Start: 2022-12-19 | End: 2022-12-19 | Stop reason: HOSPADM

## 2022-12-19 RX ORDER — AMOXICILLIN 250 MG
1 CAPSULE ORAL 2 TIMES DAILY
Status: DISCONTINUED | OUTPATIENT
Start: 2022-12-19 | End: 2022-12-24 | Stop reason: HOSPADM

## 2022-12-19 RX ORDER — KETAMINE HCL IN NACL, ISO-OSM 100MG/10ML
SYRINGE (ML) INJECTION AS NEEDED
Status: DISCONTINUED | OUTPATIENT
Start: 2022-12-19 | End: 2022-12-19 | Stop reason: SURG

## 2022-12-19 RX ORDER — SODIUM CHLORIDE 0.9 % (FLUSH) 0.9 %
10 SYRINGE (ML) INJECTION AS NEEDED
Status: DISCONTINUED | OUTPATIENT
Start: 2022-12-19 | End: 2022-12-19 | Stop reason: HOSPADM

## 2022-12-19 RX ORDER — OXYCODONE AND ACETAMINOPHEN 10; 325 MG/1; MG/1
1 TABLET ORAL ONCE AS NEEDED
Status: DISCONTINUED | OUTPATIENT
Start: 2022-12-19 | End: 2022-12-19 | Stop reason: HOSPADM

## 2022-12-19 RX ORDER — SODIUM CHLORIDE 9 MG/ML
100 INJECTION, SOLUTION INTRAVENOUS CONTINUOUS
Status: DISCONTINUED | OUTPATIENT
Start: 2022-12-19 | End: 2022-12-22

## 2022-12-19 RX ORDER — ONDANSETRON 2 MG/ML
4 INJECTION INTRAMUSCULAR; INTRAVENOUS ONCE AS NEEDED
Status: DISCONTINUED | OUTPATIENT
Start: 2022-12-19 | End: 2022-12-19 | Stop reason: HOSPADM

## 2022-12-19 RX ORDER — DEXAMETHASONE SODIUM PHOSPHATE 4 MG/ML
INJECTION, SOLUTION INTRA-ARTICULAR; INTRALESIONAL; INTRAMUSCULAR; INTRAVENOUS; SOFT TISSUE AS NEEDED
Status: DISCONTINUED | OUTPATIENT
Start: 2022-12-19 | End: 2022-12-19 | Stop reason: SURG

## 2022-12-19 RX ORDER — ATORVASTATIN CALCIUM 10 MG/1
20 TABLET, FILM COATED ORAL DAILY
Status: DISCONTINUED | OUTPATIENT
Start: 2022-12-19 | End: 2022-12-24 | Stop reason: HOSPADM

## 2022-12-19 RX ORDER — SUCCINYLCHOLINE/SOD CL,ISO/PF 200MG/10ML
SYRINGE (ML) INTRAVENOUS AS NEEDED
Status: DISCONTINUED | OUTPATIENT
Start: 2022-12-19 | End: 2022-12-19 | Stop reason: SURG

## 2022-12-19 RX ORDER — BUPIVACAINE HCL/0.9 % NACL/PF 0.1 %
2 PLASTIC BAG, INJECTION (ML) EPIDURAL ONCE
Status: COMPLETED | OUTPATIENT
Start: 2022-12-19 | End: 2022-12-19

## 2022-12-19 RX ORDER — SODIUM CHLORIDE, SODIUM LACTATE, POTASSIUM CHLORIDE, CALCIUM CHLORIDE 600; 310; 30; 20 MG/100ML; MG/100ML; MG/100ML; MG/100ML
100 INJECTION, SOLUTION INTRAVENOUS CONTINUOUS PRN
Status: DISCONTINUED | OUTPATIENT
Start: 2022-12-19 | End: 2022-12-19 | Stop reason: HOSPADM

## 2022-12-19 RX ORDER — ACETAMINOPHEN 500 MG
1000 TABLET ORAL ONCE
Status: COMPLETED | OUTPATIENT
Start: 2022-12-19 | End: 2022-12-19

## 2022-12-19 RX ORDER — ACETAMINOPHEN 325 MG/1
650 TABLET ORAL EVERY 4 HOURS PRN
Status: DISCONTINUED | OUTPATIENT
Start: 2022-12-19 | End: 2022-12-24 | Stop reason: HOSPADM

## 2022-12-19 RX ORDER — NALOXONE HCL 0.4 MG/ML
0.4 VIAL (ML) INJECTION AS NEEDED
Status: DISCONTINUED | OUTPATIENT
Start: 2022-12-19 | End: 2022-12-19 | Stop reason: HOSPADM

## 2022-12-19 RX ORDER — OXYCODONE HCL 20 MG/1
20 TABLET, FILM COATED, EXTENDED RELEASE ORAL ONCE
Status: COMPLETED | OUTPATIENT
Start: 2022-12-19 | End: 2022-12-19

## 2022-12-19 RX ORDER — MIDAZOLAM HYDROCHLORIDE 1 MG/ML
1 INJECTION INTRAMUSCULAR; INTRAVENOUS
Status: DISCONTINUED | OUTPATIENT
Start: 2022-12-19 | End: 2022-12-19 | Stop reason: HOSPADM

## 2022-12-19 RX ORDER — NEOSTIGMINE METHYLSULFATE 5 MG/5 ML
SYRINGE (ML) INTRAVENOUS AS NEEDED
Status: DISCONTINUED | OUTPATIENT
Start: 2022-12-19 | End: 2022-12-19 | Stop reason: SURG

## 2022-12-19 RX ORDER — ONDANSETRON 4 MG/1
4 TABLET, FILM COATED ORAL EVERY 6 HOURS PRN
Status: DISCONTINUED | OUTPATIENT
Start: 2022-12-19 | End: 2022-12-24 | Stop reason: HOSPADM

## 2022-12-19 RX ORDER — CYCLOBENZAPRINE HCL 10 MG
10 TABLET ORAL 3 TIMES DAILY PRN
Status: DISCONTINUED | OUTPATIENT
Start: 2022-12-19 | End: 2022-12-24 | Stop reason: HOSPADM

## 2022-12-19 RX ORDER — OXYCODONE AND ACETAMINOPHEN 7.5; 325 MG/1; MG/1
1 TABLET ORAL EVERY 4 HOURS PRN
Status: DISCONTINUED | OUTPATIENT
Start: 2022-12-19 | End: 2022-12-24 | Stop reason: HOSPADM

## 2022-12-19 RX ORDER — FENTANYL CITRATE 50 UG/ML
25 INJECTION, SOLUTION INTRAMUSCULAR; INTRAVENOUS
Status: DISCONTINUED | OUTPATIENT
Start: 2022-12-19 | End: 2022-12-19 | Stop reason: HOSPADM

## 2022-12-19 RX ORDER — NALOXONE HCL 0.4 MG/ML
0.4 VIAL (ML) INJECTION
Status: DISCONTINUED | OUTPATIENT
Start: 2022-12-19 | End: 2022-12-24 | Stop reason: HOSPADM

## 2022-12-19 RX ORDER — HYDROCHLOROTHIAZIDE 25 MG/1
12.5 TABLET ORAL DAILY
Status: DISCONTINUED | OUTPATIENT
Start: 2022-12-19 | End: 2022-12-24 | Stop reason: HOSPADM

## 2022-12-19 RX ORDER — SODIUM CHLORIDE 0.9 % (FLUSH) 0.9 %
10 SYRINGE (ML) INJECTION AS NEEDED
Status: DISCONTINUED | OUTPATIENT
Start: 2022-12-19 | End: 2022-12-24 | Stop reason: HOSPADM

## 2022-12-19 RX ORDER — LIDOCAINE HYDROCHLORIDE 20 MG/ML
INJECTION, SOLUTION EPIDURAL; INFILTRATION; INTRACAUDAL; PERINEURAL AS NEEDED
Status: DISCONTINUED | OUTPATIENT
Start: 2022-12-19 | End: 2022-12-19 | Stop reason: SURG

## 2022-12-19 RX ORDER — EPHEDRINE SULFATE 50 MG/ML
INJECTION, SOLUTION INTRAVENOUS AS NEEDED
Status: DISCONTINUED | OUTPATIENT
Start: 2022-12-19 | End: 2022-12-19 | Stop reason: SURG

## 2022-12-19 RX ORDER — BUPIVACAINE HCL/0.9 % NACL/PF 0.1 %
2 PLASTIC BAG, INJECTION (ML) EPIDURAL EVERY 8 HOURS
Status: COMPLETED | OUTPATIENT
Start: 2022-12-19 | End: 2022-12-20

## 2022-12-19 RX ORDER — OXYCODONE AND ACETAMINOPHEN 7.5; 325 MG/1; MG/1
2 TABLET ORAL EVERY 4 HOURS PRN
Status: DISCONTINUED | OUTPATIENT
Start: 2022-12-19 | End: 2022-12-24 | Stop reason: HOSPADM

## 2022-12-19 RX ORDER — CLONIDINE HYDROCHLORIDE 0.1 MG/1
0.1 TABLET ORAL 3 TIMES DAILY
Status: DISCONTINUED | OUTPATIENT
Start: 2022-12-19 | End: 2022-12-24 | Stop reason: HOSPADM

## 2022-12-19 RX ORDER — POLYETHYLENE GLYCOL 3350 17 G/17G
17 POWDER, FOR SOLUTION ORAL DAILY PRN
Status: DISCONTINUED | OUTPATIENT
Start: 2022-12-19 | End: 2022-12-22

## 2022-12-19 RX ORDER — LOSARTAN POTASSIUM 50 MG/1
25 TABLET ORAL DAILY
Status: DISCONTINUED | OUTPATIENT
Start: 2022-12-19 | End: 2022-12-24 | Stop reason: HOSPADM

## 2022-12-19 RX ORDER — SODIUM CHLORIDE 9 MG/ML
40 INJECTION, SOLUTION INTRAVENOUS AS NEEDED
Status: DISCONTINUED | OUTPATIENT
Start: 2022-12-19 | End: 2022-12-24 | Stop reason: HOSPADM

## 2022-12-19 RX ORDER — ROCURONIUM BROMIDE 10 MG/ML
INJECTION, SOLUTION INTRAVENOUS AS NEEDED
Status: DISCONTINUED | OUTPATIENT
Start: 2022-12-19 | End: 2022-12-19 | Stop reason: SURG

## 2022-12-19 RX ADMIN — CLONIDINE HYDROCHLORIDE 0.1 MG: 0.1 TABLET ORAL at 15:21

## 2022-12-19 RX ADMIN — EPHEDRINE SULFATE 10 MG: 50 INJECTION INTRAVENOUS at 10:42

## 2022-12-19 RX ADMIN — SODIUM CHLORIDE, POTASSIUM CHLORIDE, SODIUM LACTATE AND CALCIUM CHLORIDE: 600; 310; 30; 20 INJECTION, SOLUTION INTRAVENOUS at 11:12

## 2022-12-19 RX ADMIN — Medication 3 MG: at 12:47

## 2022-12-19 RX ADMIN — Medication 2 G: at 11:05

## 2022-12-19 RX ADMIN — LOSARTAN POTASSIUM 25 MG: 50 TABLET, FILM COATED ORAL at 15:21

## 2022-12-19 RX ADMIN — GLIPIZIDE 5 MG: 5 TABLET ORAL at 15:21

## 2022-12-19 RX ADMIN — SODIUM CHLORIDE, POTASSIUM CHLORIDE, SODIUM LACTATE AND CALCIUM CHLORIDE 1000 ML: 600; 310; 30; 20 INJECTION, SOLUTION INTRAVENOUS at 08:50

## 2022-12-19 RX ADMIN — ONDANSETRON 4 MG: 2 INJECTION INTRAMUSCULAR; INTRAVENOUS at 12:20

## 2022-12-19 RX ADMIN — Medication 50 MG: at 11:18

## 2022-12-19 RX ADMIN — FENTANYL CITRATE 250 MCG: 0.05 INJECTION, SOLUTION INTRAMUSCULAR; INTRAVENOUS at 11:10

## 2022-12-19 RX ADMIN — LIDOCAINE HYDROCHLORIDE 100 MG: 20 INJECTION, SOLUTION EPIDURAL; INFILTRATION; INTRACAUDAL; PERINEURAL at 10:25

## 2022-12-19 RX ADMIN — METFORMIN HYDROCHLORIDE 1000 MG: 500 TABLET ORAL at 17:55

## 2022-12-19 RX ADMIN — Medication 10 ML: at 20:13

## 2022-12-19 RX ADMIN — PROPOFOL 175 MCG/KG/MIN: 10 INJECTION, EMULSION INTRAVENOUS at 12:14

## 2022-12-19 RX ADMIN — FENTANYL CITRATE 100 MCG: 0.05 INJECTION, SOLUTION INTRAMUSCULAR; INTRAVENOUS at 11:08

## 2022-12-19 RX ADMIN — FENTANYL CITRATE 150 MCG: 0.05 INJECTION, SOLUTION INTRAMUSCULAR; INTRAVENOUS at 10:18

## 2022-12-19 RX ADMIN — CEFAZOLIN 2 G: 2 INJECTION, POWDER, FOR SOLUTION INTRAMUSCULAR; INTRAVENOUS at 18:13

## 2022-12-19 RX ADMIN — SODIUM CHLORIDE, POTASSIUM CHLORIDE, SODIUM LACTATE AND CALCIUM CHLORIDE 1000 ML: 600; 310; 30; 20 INJECTION, SOLUTION INTRAVENOUS at 08:58

## 2022-12-19 RX ADMIN — ROCURONIUM BROMIDE 5 MG: 10 SOLUTION INTRAVENOUS at 10:25

## 2022-12-19 RX ADMIN — ROCURONIUM BROMIDE 45 MG: 10 SOLUTION INTRAVENOUS at 11:05

## 2022-12-19 RX ADMIN — Medication 3 ML: at 20:48

## 2022-12-19 RX ADMIN — GABAPENTIN 300 MG: 300 CAPSULE ORAL at 15:21

## 2022-12-19 RX ADMIN — DOCUSATE SODIUM 50 MG AND SENNOSIDES 8.6 MG 1 TABLET: 8.6; 5 TABLET, FILM COATED ORAL at 20:13

## 2022-12-19 RX ADMIN — ACETAMINOPHEN 1000 MG: 500 TABLET ORAL at 09:52

## 2022-12-19 RX ADMIN — GABAPENTIN 300 MG: 300 CAPSULE ORAL at 20:13

## 2022-12-19 RX ADMIN — Medication 120 MG: at 10:25

## 2022-12-19 RX ADMIN — GLYCOPYRROLATE 0.4 MG: 0.2 INJECTION INTRAMUSCULAR; INTRAVENOUS at 12:47

## 2022-12-19 RX ADMIN — MIDAZOLAM HYDROCHLORIDE 1 MG: 2 INJECTION, SOLUTION INTRAMUSCULAR; INTRAVENOUS at 09:52

## 2022-12-19 RX ADMIN — OXYCODONE HYDROCHLORIDE 20 MG: 20 TABLET, FILM COATED, EXTENDED RELEASE ORAL at 08:48

## 2022-12-19 RX ADMIN — Medication 50 MG: at 10:38

## 2022-12-19 RX ADMIN — PROPOFOL 150 MCG/KG/MIN: 10 INJECTION, EMULSION INTRAVENOUS at 11:11

## 2022-12-19 RX ADMIN — PROPOFOL 150 MCG/KG/MIN: 10 INJECTION, EMULSION INTRAVENOUS at 10:26

## 2022-12-19 RX ADMIN — PROPOFOL 200 MG: 10 INJECTION, EMULSION INTRAVENOUS at 10:25

## 2022-12-19 RX ADMIN — FENTANYL CITRATE 100 MCG: 50 INJECTION, SOLUTION INTRAMUSCULAR; INTRAVENOUS at 11:24

## 2022-12-19 RX ADMIN — DEXAMETHASONE SODIUM PHOSPHATE 4 MG: 4 INJECTION, SOLUTION INTRA-ARTICULAR; INTRALESIONAL; INTRAMUSCULAR; INTRAVENOUS; SOFT TISSUE at 10:40

## 2022-12-19 RX ADMIN — ATORVASTATIN CALCIUM 20 MG: 10 TABLET, FILM COATED ORAL at 15:21

## 2022-12-19 RX ADMIN — HYDROCHLOROTHIAZIDE 12.5 MG: 25 TABLET ORAL at 15:21

## 2022-12-19 RX ADMIN — CLONIDINE HYDROCHLORIDE 0.1 MG: 0.1 TABLET ORAL at 20:13

## 2022-12-19 RX ADMIN — SODIUM CHLORIDE 100 ML/HR: 9 INJECTION, SOLUTION INTRAVENOUS at 15:22

## 2022-12-19 NOTE — H&P
The office history and physical exam was reviewed.  There are no changes.  Proceed with surgery as planned.

## 2022-12-19 NOTE — THERAPY EVALUATION
Patient Name: Manuel Moscoso  : 1960    MRN: [id number]                              Today's Date: 2022       Admit Date: 2022    Visit Dx:     ICD-10-CM ICD-9-CM   1. Chronic bilateral low back pain without sciatica  M54.50 724.2    G89.29 338.29   2. Impaired mobility  Z74.09 799.89     Patient Active Problem List   Diagnosis   • Lumbosacral disc disease   • Chronic bilateral low back pain without sciatica   • Lumbago of multiple sites in spine with sciatica     Past Medical History:   Diagnosis Date   • Allergy to alpha-gal    • Arthritis    • Chronic bilateral low back pain without sciatica 2022   • Diabetes mellitus (HCC)    • Hearing loss, left    • History of staph infection    • Hypertension    • Low back pain    • Lumbosacral disc disease 2022   • Psoriasis    • Sleep apnea    • Tinnitus    • Vision disturbance     using rx eyedrops     Past Surgical History:   Procedure Laterality Date   • BACK SURGERY      x 2   • CHOLECYSTECTOMY     • COLONOSCOPY     • GANGLION CYST EXCISION Left     wrist   • LIPOMA EXCISION      back      General Information     Row Name 22 1449          Physical Therapy Time and Intention    Document Type evaluation  s/p  Anterior discectomy decompression with bilateral neural foraminotomy L5-S1, Anterior lumbar interbody fusion L5-S1  -DOUG     Mode of Treatment physical therapy  -DOUG     Row Name 22 1449          General Information    Patient Profile Reviewed yes  -DOUG     Prior Level of Function independent:;all household mobility;ADL's  -DOUG     Existing Precautions/Restrictions fall;brace worn when out of bed;LSO;spinal  -DOUG     Barriers to Rehab medically complex  -DOUG     Row Name 22 1449          Living Environment    People in Home significant other  -DOUG     Row Name 22 1449          Home Main Entrance    Number of Stairs, Main Entrance four  -DOUG     Stair Railings, Main Entrance railings on both sides of stairs  -DOUG      Row Name 12/19/22 1449          Stairs Within Home, Primary    Number of Stairs, Within Home, Primary none  -DOUG     Row Name 12/19/22 1449          Cognition    Orientation Status (Cognition) oriented x 4  -DOUG     Row Name 12/19/22 1449          Safety Issues, Functional Mobility    Impairments Affecting Function (Mobility) balance;strength;pain  -DOUG           User Key  (r) = Recorded By, (t) = Taken By, (c) = Cosigned By    Initials Name Provider Type    Rakesh Pacheco, PT DPT Physical Therapist               Mobility     Row Name 12/19/22 1449          Bed Mobility    Bed Mobility sidelying-sit  -DOUG     Sidelying-Sit Raymond (Bed Mobility) supervision  -DOUG     Assistive Device (Bed Mobility) head of bed elevated  -DOUG     Row Name 12/19/22 1449          Sit-Stand Transfer    Sit-Stand Raymond (Transfers) contact guard  -DOUG     Row Name 12/19/22 1449          Gait/Stairs (Locomotion)    Raymond Level (Gait) contact guard;minimum assist (75% patient effort)  -DOUG     Distance in Feet (Gait) 200 ft  -DOUG     Deviations/Abnormal Patterns (Gait) gait speed decreased  -DOUG     Comment, (Gait/Stairs) few LOB needing min assist to maintain dynamic balance. LOB felt possibly due to anesthesia from today's surgery.   -DOUG           User Key  (r) = Recorded By, (t) = Taken By, (c) = Cosigned By    Initials Name Provider Type    Rakesh Pacheco, PT DPT Physical Therapist               Obj/Interventions     Row Name 12/19/22 1449          Range of Motion Comprehensive    General Range of Motion bilateral lower extremity ROM WFL  -DOUG     Row Name 12/19/22 1449          Strength Comprehensive (MMT)    Comment, General Manual Muscle Testing (MMT) Assessment B LE grossly 4/5  -DOUG     Row Name 12/19/22 1449          Balance    Balance Assessment sitting dynamic balance;standing dynamic balance  -DOUG     Dynamic Sitting Balance supervision  -DOUG     Position, Sitting Balance unsupported;sitting edge of bed   -DOUG     Dynamic Standing Balance contact guard;minimal assist  -DOUG     Position/Device Used, Standing Balance supported  -DOUG     Row Name 12/19/22 1449          Sensory Assessment (Somatosensory)    Sensory Assessment (Somatosensory) other (see comments)  chornic B LE neuropathy  -DOUG           User Key  (r) = Recorded By, (t) = Taken By, (c) = Cosigned By    Initials Name Provider Type    Rakesh Pacheco, PT DPT Physical Therapist               Goals/Plan     Row Name 12/19/22 1449          Transfer Goal 1 (PT)    Activity/Assistive Device (Transfer Goal 1, PT) sit-to-stand/stand-to-sit;bed-to-chair/chair-to-bed  -DOUG     [place] Level/Cues Needed (Transfer Goal 1, PT) independent  -DOUG     Time Frame (Transfer Goal 1, PT) long term goal (LTG);10 days  -DOUG     Progress/Outcome (Transfer Goal 1, PT) new goal  -DOUG     Row Name 12/19/22 1449          Gait Training Goal 1 (PT)    Activity/Assistive Device (Gait Training Goal 1, PT) gait (walking locomotion);decrease fall risk;improve balance and speed;increase endurance/gait distance  -DOUG     [place] Level (Gait Training Goal 1, PT) independent  -DOUG     Distance (Gait Training Goal 1, PT) 200 ft, no LOB  -DOUG     Time Frame (Gait Training Goal 1, PT) long term goal (LTG);10 days  -DOUG     Progress/Outcome (Gait Training Goal 1, PT) new goal  -DOUG     Row Name 12/19/22 1449          Stairs Goal 1 (PT)    Activity/Assistive Device (Stairs Goal 1, PT) ascending stairs;descending stairs  -DOUG     [place] Level/Cues Needed (Stairs Goal 1, PT) supervision required  -DOUG     Number of Stairs (Stairs Goal 1, PT) 3-5 steps  -DOUG     Time Frame (Stairs Goal 1, PT) long term goal (LTG);10 days  -DOUG     Progress/Outcome (Stairs Goal 1, PT) new goal  -DOUG     Row Name 12/19/22 1449          Therapy Assessment/Plan (PT)    Planned Therapy Interventions (PT) bed mobility training;transfer training;gait training;balance training;home exercise program;patient/family  education;postural re-education;stair training;strengthening;orthotic fitting/training  -DOUG           User Key  (r) = Recorded By, (t) = Taken By, (c) = Cosigned By    Initials Name Provider Type    Rakesh Pacheco PT DPT Physical Therapist               Clinical Impression     Row Name 12/19/22 1449          Pain    Pretreatment Pain Rating 4/10  -DOUG     Pain Location incisional  -DOUG     Pain Location - abdomen  -DOUG     Pain Intervention(s) Repositioned;Medication (See MAR);Ambulation/increased activity  -DOUG     Row Name 12/19/22 1449          Plan of Care Review    Plan of Care Reviewed With patient  -DOUG     Outcome Evaluation PT eval complete. He is alert and oriented x 4. Rates his surgical pain 4/10. He stands and ambulates with CGA/min assist. He experienced a few LOB needing min assist to maintain dynamic balance. LOB felt possibly due to anesthesia from today's surgery. He was also unaware that he already had surgery today. PT will cont to progress mobility and will re-educate again tomorrow on LSO use/radha/doff and back precuations. He is awaiting 2nd part back surgery Wednesday.  -DOUG     Row Name 12/19/22 1449          Therapy Assessment/Plan (PT)    Patient/Family Therapy Goals Statement (PT) decrease pain  -DOUG     Rehab Potential (PT) good, to achieve stated therapy goals  -DOUG     Criteria for Skilled Interventions Met (PT) yes;meets criteria;skilled treatment is necessary  -DOUG     Therapy Frequency (PT) 2 times/day  -DOUG     Predicted Duration of Therapy Intervention (PT) until d.c  -DOUG     Row Name 12/19/22 1449          Positioning and Restraints    Pre-Treatment Position in bed  -DOUG     Post Treatment Position chair  -DOUG     In Chair sitting;call light within reach;encouraged to call for assist;with brace  -DOUG           User Key  (r) = Recorded By, (t) = Taken By, (c) = Cosigned By    Initials Name Provider Type    Rakesh Pacheco PT DPT Physical Therapist               Outcome Measures      Row Name 12/19/22 1449          How much help from another person do you currently need...    Turning from your back to your side while in flat bed without using bedrails? 4  -DOUG     Moving from lying on back to sitting on the side of a flat bed without bedrails? 4  -DOUG     Moving to and from a bed to a chair (including a wheelchair)? 3  -DOUG     Standing up from a chair using your arms (e.g., wheelchair, bedside chair)? 3  -DOUG     Climbing 3-5 steps with a railing? 3  -DOUG     To walk in hospital room? 3  -DOUG     AM-PAC 6 Clicks Score (PT) 20  -DOUG     Highest level of mobility 6 --> Walked 10 steps or more  -DOUG     Row Name 12/19/22 1449 12/19/22 1440       Functional Assessment    Outcome Measure Options AM-PAC 6 Clicks Basic Mobility (PT)  -DOUG AM-PAC 6 Clicks Daily Activity (OT)  -KASSIE          User Key  (r) = Recorded By, (t) = Taken By, (c) = Cosigned By    Initials Name Provider Type    Rakesh Pacheco, PT DPT Physical Therapist    Steffi Oliver, OTR/L, CSRS Occupational Therapist                             Physical Therapy Education     Title: PT OT SLP Therapies (In Progress)     Topic: Physical Therapy (In Progress)     Point: Mobility training (In Progress)     Learning Progress Summary           Patient Acceptance, E, NR by DOUG at 12/19/2022 1449    Comment: Benefits of activity, progression of PT, back precautions, LSO use/radha/doff                   Point: Home exercise program (Not Started)     Learner Progress:  Not documented in this visit.          Point: Body mechanics (Not Started)     Learner Progress:  Not documented in this visit.          Point: Precautions (In Progress)     Learning Progress Summary           Patient Acceptance, E, NR by DOUG at 12/19/2022 1449    Comment: Benefits of activity, progression of PT, back precautions, LSO use/radha/doff                               User Key     Initials Effective Dates Name Provider Type Paul CAMACHO 08/02/16 -  Rakesh Cheung  PT DPT Physical Therapist PT              PT Recommendation and Plan  Planned Therapy Interventions (PT): bed mobility training, transfer training, gait training, balance training, home exercise program, patient/family education, postural re-education, stair training, strengthening, orthotic fitting/training  Plan of Care Reviewed With: patient  Outcome Evaluation: PT eval complete. He is alert and oriented x 4. Rates his surgical pain 4/10. He stands and ambulates with CGA/min assist. He experienced a few LOB needing min assist to maintain dynamic balance. LOB felt possibly due to anesthesia from today's surgery. He was also unaware that he already had surgery today. PT will cont to progress mobility and will re-educate again tomorrow on LSO use/radha/doff and back precuations. He is awaiting 2nd part back surgery Wednesday.     Time Calculation:    PT Charges     Row Name 12/19/22 1537             Time Calculation    Start Time 1449  -DOUG      Stop Time 1530  -DOUG      Time Calculation (min) 41 min  -DOUG      PT Received On 12/19/22  -DOUG      PT Goal Re-Cert Due Date 12/29/22  -DOUG            User Key  (r) = Recorded By, (t) = Taken By, (c) = Cosigned By    Initials Name Provider Type    Rakesh Pacheco, PT DPT Physical Therapist              Therapy Charges for Today     Code Description Service Date Service Provider Modifiers Qty    [id number]  PT EVAL MOD COMPLEXITY 3 12/19/2022 Rakesh Cheung PT DPT GP 1          PT G-Codes  Outcome Measure Options: AM-PAC 6 Clicks Basic Mobility (PT)  AM-PAC 6 Clicks Score (PT): 20  AM-PAC 6 Clicks Score (OT): 22  PT Discharge Summary  Anticipated Discharge Disposition (PT): other (see comments) (will await final back surgery Friday for further d/c recs)    Rakesh Cheung PT DPT  12/19/2022

## 2022-12-19 NOTE — ANESTHESIA PROCEDURE NOTES
Airway  Urgency: elective    Date/Time: 12/19/2022 10:26 AM  Airway not difficult    General Information and Staff    Patient location during procedure: OR  CRNA/CAA: Elieser Dumont CRNA    Indications and Patient Condition  Indications for airway management: airway protection    Preoxygenated: yes  Mask difficulty assessment: 1 - vent by mask    Final Airway Details  Final airway type: endotracheal airway      Successful airway: ETT  Cuffed: yes   Successful intubation technique: direct laryngoscopy  Endotracheal tube insertion site: oral  Blade: Dawkins  Blade size: 2  ETT size (mm): 7.5  Cormack-Lehane Classification: grade IIa - partial view of glottis  Placement verified by: chest auscultation and capnometry   Cuff volume (mL): 10  Measured from: lips  ETT/EBT  to lips (cm): 23  Number of attempts at approach: 1  Assessment: lips, teeth, and gum same as pre-op and atraumatic intubation

## 2022-12-19 NOTE — ANESTHESIA PREPROCEDURE EVALUATION
Anesthesia Evaluation     Patient summary reviewed and Nursing notes reviewed   no history of anesthetic complications:  NPO Solid Status: > 8 hours  NPO Liquid Status: > 8 hours           Airway   Mallampati: I  TM distance: >3 FB  Neck ROM: full  No difficulty expected  Dental      Pulmonary    (+) a smoker Former,   Cardiovascular   Exercise tolerance: good (4-7 METS)    (+) hypertension,   (-) past MI, CAD      Neuro/Psych  (-) seizures, TIA, CVA  GI/Hepatic/Renal/Endo    (+) obesity,   diabetes mellitus type 2,   (-) liver disease, no renal disease    Musculoskeletal     Abdominal    Substance History      OB/GYN          Other   arthritis,        Other Comment: psoriasis                  Anesthesia Plan    ASA 2     general     intravenous induction     Anesthetic plan, risks, benefits, and alternatives have been provided, discussed and informed consent has been obtained with: patient.        CODE STATUS:

## 2022-12-19 NOTE — PLAN OF CARE
Goal Outcome Evaluation:  Plan of Care Reviewed With: patient           Outcome Evaluation: PT eval complete. He is alert and oriented x 4. Rates his surgical pain 4/10. He stands and ambulates with CGA/min assist. He experienced a few LOB needing min assist to maintain dynamic balance. LOB felt possibly due to anesthesia from today's surgery. He was also unaware that he already had surgery today. PT will cont to progress mobility and will re-educate again tomorrow on LSO use/radha/doff and back precuations. He is awaiting 2nd part back surgery Wednesday.

## 2022-12-19 NOTE — THERAPY DISCHARGE NOTE
Acute Care - Occupational Therapy Discharge  [facility]    Patient Name: Manuel Moscoso  : 1960    MRN: [id number]                              Today's Date: 2022       Admit Date: 2022    Visit Dx:     ICD-10-CM ICD-9-CM   1. Chronic bilateral low back pain without sciatica  M54.50 724.2    G89.29 338.29     Patient Active Problem List   Diagnosis   • Lumbosacral disc disease   • Chronic bilateral low back pain without sciatica   • Lumbago of multiple sites in spine with sciatica     Past Medical History:   Diagnosis Date   • Allergy to alpha-gal    • Arthritis    • Chronic bilateral low back pain without sciatica 2022   • Diabetes mellitus (HCC)    • Hearing loss, left    • History of staph infection    • Hypertension    • Low back pain    • Lumbosacral disc disease 2022   • Psoriasis    • Sleep apnea    • Tinnitus    • Vision disturbance     using rx eyedrops     Past Surgical History:   Procedure Laterality Date   • BACK SURGERY      x 2   • CHOLECYSTECTOMY     • COLONOSCOPY     • GANGLION CYST EXCISION Left     wrist   • LIPOMA EXCISION      back      General Information     Row Name 22 1440          OT Time and Intention    Document Type evaluation  s/p anterior decompression and ALIF L5-S1.  -JJ     Mode of Treatment occupational therapy  -JJ     Row Name 22 1440          General Information    Patient Profile Reviewed yes  -JJ     Prior Level of Function independent:;all household mobility;transfer;bed mobility;ADL's  -JJ     Existing Precautions/Restrictions fall;brace worn when out of bed;spinal  -JJ     Barriers to Rehab none identified  -JJ     Row Name 22 1440          Occupational Profile    Environmental Supports and Barriers (Occupational Profile) tub shower  -JJ     Row Name 22 1440          Living Environment    People in Home significant other  -JJ     Row Name 22 1440          Home Main Entrance    Number of Stairs, Main Entrance  four  -JJ     Stair Railings, Main Entrance railings on both sides of stairs  -     Row Name 12/19/22 1440          Stairs Within Home, Primary    Number of Stairs, Within Home, Primary none  -J     Stair Railings, Within Home, Primary none  -     Row Name 12/19/22 1440          Cognition    Orientation Status (Cognition) oriented x 4  -JJ     Row Name 12/19/22 1440          Safety Issues, Functional Mobility    Impairments Affecting Function (Mobility) balance;pain  -           User Key  (r) = Recorded By, (t) = Taken By, (c) = Cosigned By    Initials Name Provider Type    Steffi Lange, OTR/L, CSRS Occupational Therapist               Mobility/ADL's     Row Name 12/19/22 1440          Bed Mobility    Bed Mobility rolling left;sidelying-sit  -     Rolling Left [place] (Bed Mobility) supervision  -     Sidelying-Sit [place] (Bed Mobility) supervision  -     Assistive Device (Bed Mobility) head of bed elevated;bed rails  -     Row Name 12/19/22 1440          Transfers    Transfers sit-stand transfer;stand-sit transfer  -[facility] Name 12/19/22 1440          Sit-Stand Transfer    Sit-Stand [place] (Transfers) supervision;contact guard  -     Row Name 12/19/22 1440          Stand-Sit Transfer    Stand-Sit [place] (Transfers) supervision;contact guard  -[facility] Name 12/19/22 1440          Functional Mobility    Functional Mobility- Ind. Level contact guard assist  -     Functional Mobility- Comment in hallway  -     Row Name 12/19/22 1440          Activities of Daily Living    BADL Assessment/Intervention upper body dressing;lower body dressing  -     Row Name 12/19/22 1440          Upper Body Dressing Assessment/Training    [place] Level (Upper Body Dressing) don;moderate assist (50% patient effort)  -     Position (Upper Body Dressing) edge of bed sitting  -     Comment, (Upper Body Dressing) LSO  -     Row Name 12/19/22 1440          Lower Body  Dressing Assessment/Training    [place] Level (Lower Body Dressing) don;socks;independent  -JJ     Position (Lower Body Dressing) edge of bed sitting  -JJ           User Key  (r) = Recorded By, (t) = Taken By, (c) = Cosigned By    Initials Name Provider Type    Steffi Oliver OTR/L, CSRS Occupational Therapist               Obj/Interventions     Row Name 12/19/22 1440          Sensory Assessment (Somatosensory)    Sensory Assessment B feet neuropathy  -     Row Name 12/19/22 1440          Vision Assessment/Intervention    Visual Impairment/Limitations WFL  -     Row Name 12/19/22 1440          Range of Motion Comprehensive    General Range of Motion bilateral upper extremity ROM L  -     Row Name 12/19/22 1440          Strength Comprehensive (MMT)    Comment, General Manual Muscle Testing (MMT) Assessment B UE strength grossly 5/5  -     Row Name 12/19/22 1440          Balance    Balance Assessment sitting static balance;sitting dynamic balance;standing dynamic balance;standing static balance  -JJ     Static Sitting Balance independent  -JJ     Dynamic Sitting Balance independent  -JJ     Position, Sitting Balance unsupported;sitting edge of bed  -JJ     Static Standing Balance standby assist  -JJ     Dynamic Standing Balance contact guard  -JJ     Position/Device Used, Standing Balance unsupported  -JJ           User Key  (r) = Recorded By, (t) = Taken By, (c) = Cosigned By    Initials Name Provider Type    Steffi Oliver OTR/L, CSRS Occupational Therapist               Goals/Plan    No documentation.                Clinical Impression     Row Name 12/19/22 1440          Pain Assessment    Pretreatment Pain Rating 4/10  -JJ     Pain Location incisional  -JJ     Pain Location - abdomen;back  -JJ     Pain Intervention(s) Medication (See MAR);Repositioned;Ambulation/increased activity  -     Row Name 12/19/22 1440          Plan of Care Review    Plan of Care Reviewed With patient  -KASSIE      Outcome Evaluation OT eval completed. Pt presents alert and oriented x4, c/o minimal abd and back pain. He was educated on spinal precautions, LSO fitting/mgt/wear schedule and adl modifications. He was able to complete bed mobility with Sup while demonstrating proper body mechanics. Mod A to don LSO while seated at EOB and able to independently don socks. Sup/CGA for sit <> stand t/f from EOB and CGA for all functional mobility. Will defer further balance training to PT. OT to sign off as pt does not display deficits which require skilled OT services. Will re-eval s/p 2 part sx.  -JJ     Row Name 12/19/22 1446          Therapy Assessment/Plan (OT)    Rehab Potential (OT) --  -JJ     Criteria for Skilled Therapeutic Interventions Met (OT) no;does not meet criteria for skilled intervention  -JJ     Therapy Frequency (OT) evaluation only  -JJ     Predicted Duration of Therapy Intervention (OT) --  -JJ     Row Name 12/19/22 1440          Therapy Plan Review/Discharge Plan (OT)    Anticipated Discharge Disposition (OT) home with assist  -JJ     Row Name 12/19/22 1443          Positioning and Restraints    Pre-Treatment Position in bed  -JJ     Post Treatment Position chair  -JJ     In Chair notified nsg;sitting;call light within reach;encouraged to call for assist;with brace  -JJ           User Key  (r) = Recorded By, (t) = Taken By, (c) = Cosigned By    Initials Name Provider Type    Steffi Oliver, OTR/L, CSRS Occupational Therapist               Outcome Measures     Row Name 12/19/22 1444          How much help from another is currently needed...    Putting on and taking off regular lower body clothing? 4  -JJ     Bathing (including washing, rinsing, and drying) 3  -JJ     Toileting (which includes using toilet bed pan or urinal) 4  -JJ     Putting on and taking off regular upper body clothing 3  -JJ     Taking care of personal grooming (such as brushing teeth) 4  -JJ     Eating meals 4  -JJ     AM-PAC 6  Clicks Score (OT) 22  -     Row Name 12/19/22 1440          Functional Assessment    Outcome Measure Options AM-PAC 6 Clicks Daily Activity (OT)  -           User Key  (r) = Recorded By, (t) = Taken By, (c) = Cosigned By    Initials Name Provider Type     Steffi Tenorio OTR/L, CSRS Occupational Therapist              Occupational Therapy Education     Title: PT OT SLP Therapies (In Progress)     Topic: Occupational Therapy (In Progress)     Point: ADL training (Done)     Description:   Instruct learner(s) on proper safety adaptation and remediation techniques during self care or transfers.   Instruct in proper use of assistive devices.              Learning Progress Summary           Patient Acceptance, E, VU by  at 12/19/2022 1533                   Point: Home exercise program (Not Started)     Description:   Instruct learner(s) on appropriate technique for monitoring, assisting and/or progressing therapeutic exercises/activities.              Learner Progress:  Not documented in this visit.          Point: Precautions (Done)     Description:   Instruct learner(s) on prescribed precautions during self-care and functional transfers.              Learning Progress Summary           Patient Acceptance, E, VU by  at 12/19/2022 1533                   Point: Body mechanics (Not Started)     Description:   Instruct learner(s) on proper positioning and spine alignment during self-care, functional mobility activities and/or exercises.              Learner Progress:  Not documented in this visit.                      User Key     Initials Effective Dates Name Provider Type Discipline     11/10/21 -  Steffi Tenorio OTR/L, MEAGANS Occupational Therapist OT              OT Recommendation and Plan  Therapy Frequency (OT): evaluation only  Plan of Care Review  Plan of Care Reviewed With: patient  Outcome Evaluation: OT eval completed. Pt presents alert and oriented x4, c/o minimal abd and back pain. He was  educated on spinal precautions, LSO fitting/mgt/wear schedule and adl modifications. He was able to complete bed mobility with Sup while demonstrating proper body mechanics. Mod A to don LSO while seated at EOB and able to independently don socks. Sup/CGA for sit <> stand t/f from EOB and CGA for all functional mobility. Will defer further balance training to PT. OT to sign off as pt does not display deficits which require skilled OT services. Will re-eval s/p 2 part sx.  Plan of Care Reviewed With: patient  Outcome Evaluation: OT eval completed. Pt presents alert and oriented x4, c/o minimal abd and back pain. He was educated on spinal precautions, LSO fitting/mgt/wear schedule and adl modifications. He was able to complete bed mobility with Sup while demonstrating proper body mechanics. Mod A to don LSO while seated at EOB and able to independently don socks. Sup/CGA for sit <> stand t/f from EOB and CGA for all functional mobility. Will defer further balance training to PT. OT to sign off as pt does not display deficits which require skilled OT services. Will re-eval s/p 2 part sx.     Time Calculation:    Time Calculation- OT     Row Name 12/19/22 1440             Time Calculation- OT    OT Start Time 1440  -      OT Stop Time 1520  -      OT Time Calculation (min) 40 min  -      OT Received On 12/19/22  -            User Key  (r) = Recorded By, (t) = Taken By, (c) = Cosigned By    Initials Name Provider Type    Steffi Oliver OTR/L, NELLY Occupational Therapist              Therapy Charges for Today     Code Description Service Date Service Provider Modifiers Qty    [id number]  OT EVAL LOW COMPLEXITY 3 12/19/2022 Steffi Tenorio OTR/L, MEAGANS GO 1             OT Discharge Summary  Anticipated Discharge Disposition (OT): home with assist  Reason for Discharge: Independent  Outcomes Achieved: Other (evalx1)  Discharge Destination: Home with assist    SHERYL Jarvis/LAKSHMI,  CSRS  12/19/2022

## 2022-12-19 NOTE — PLAN OF CARE
Goal Outcome Evaluation:  Plan of Care Reviewed With: patient           Outcome Evaluation: OT eval completed. Pt presents alert and oriented x4, c/o minimal abd and back pain. He was educated on spinal precautions, LSO fitting/mgt/wear schedule and adl modifications. He was able to complete bed mobility with Sup while demonstrating proper body mechanics. Mod A to don LSO while seated at EOB and able to independently don socks. Sup/CGA for sit <> stand t/f from EOB and CGA for all functional mobility. Will defer further balance training to PT. OT to sign off as pt does not display deficits which require skilled OT services. Will re-eval s/p 2 part sx.

## 2022-12-19 NOTE — ANESTHESIA POSTPROCEDURE EVALUATION
"Patient: Manuel Moscoso    Procedure Summary     Date: 12/19/22 Room / Location:  [facility] OR  /  [facility] OR    Anesthesia Start: 1018 Anesthesia Stop: 1303    Procedures:       ANTERIOR DECOMPRESSION, ANTERIOR LUMBAR INTERBODY FUSION WITH INSTRUMENTATION L5-S1 (Spine Lumbar)      ANTERIOR LUMBAR EXPOSURE (Abdomen) Diagnosis: (M54.16)    Surgeons: CHARITO Garcia MD; Simba Dyson DO Provider: Elieser Dumont CRNA    Anesthesia Type: general ASA Status: 2          Anesthesia Type: general    Vitals  Vitals Value Taken Time   /79 12/19/22 1359   Temp 97.4 °F (36.3 °C) 12/19/22 1358   Pulse 63 12/19/22 1358   Resp 15 12/19/22 1358   SpO2 98 % 12/19/22 1358           Post Anesthesia Care and Evaluation    Patient location during evaluation: PACU  Patient participation: complete - patient participated  Level of consciousness: awake and alert  Pain management: adequate    Airway patency: patent  Anesthetic complications: No anesthetic complications    Cardiovascular status: acceptable  Respiratory status: acceptable  Hydration status: acceptable    Comments: Blood pressure 143/76, pulse 52, temperature 97.7 °F (36.5 °C), temperature source Oral, resp. rate 16, height 175.3 cm (69\"), weight 101 kg (223 lb 9.6 oz), SpO2 100 %.    Pt discharged from PACU based on nneka score >8      "

## 2022-12-19 NOTE — OP NOTE
Anterior lumbar interbody fusion procedure Note    Manuel Moscoso  12/19/2022    Pre-op Diagnosis:    1. Status post left L4-5 microdiskectomy, 11/18/2019.  2. Status post revision left L4-5 microdiskectomy, insertion annular closure device, 11/01/2021.  3. Increasing chronic low back pain.  4. Residual left buttock, thigh and leg radiculopathy.  5. Neurogenic claudication.  6. Multilevel lumbar degenerative disc disease, L2 to S1, severe L4 to S1.  7. Multilevel lumbar facet arthropathy, severe L4 to S1.  8. Degenerative scoliosis concave left L4-5.  9. Severe central and bilateral foraminal stenosis, L4 to S1, worse right L4-5, left L5-S1.  10. History of diabetic peripheral neuropathy.  11. History of alpha-gal syndrome after tick bite.    Post-op Diagnosis:    same    Procedure/CPT® Codes:     1. Anterior discectomy decompression with bilateral neural foraminotomy L5-S1  2. Anterior lumbar interbody fusion L5-S1  3. Anterior spinal instrumentation L5-S1 (ATEC anterior plate and screws)  4. Use of PEEK interbody biomechanical device for fusion L5-S1 ([employer] PEEK spacer and screws)  5. Use of allograft bone matrix for fusion L5-S1 ([employer] Catalyst)  6. Use of fluoroscopy for confirmation of surgical level, placement of interbody spacer and instrumentation  7. Intraoperative neural monitoring     Anesthesia: General     Surgeon: CASSIE Garcia MD     Co Surgeon: Dr. Simba Dyson D.O.     Assistant: Joesph Wallace PA-C     Estimated Blood Loss: 50 mL     Complications: None     Condition: Stable to PACU.     Indications:     The patient is a 62-year-old without a primary care physician.  He underwent a left L4-5 microdiscectomy on 11/18/2019 and a revision left L4-5 microdiscectomy with the insertion of an annular closure device on 11/1/2021.  Unfortunately continued to complain of increasing chronic low back pain along with residual left buttock, thigh, and leg radiculopathy as well as symptoms  consistent with neurogenic claudication.  Repeat imaging studies revealed multilevel lumbar degenerative disc disease and facet arthropathy that was severe from L4-S1, where there was a degenerative scoliosis concave to the left at L4-5.  The degenerative changes created severe central and bilateral foraminal stenosis at both levels from L4-S1 that was worse on the right at L4-5 and on the left at L5-S1.    After failing all conservative measures, it was mutually decided that surgery would be the best option.  Risks, benefits, and complications of surgery were discussed in detail. The patient appeared well informed and wished to proceed. We specifically discussed the risk of infection, blood loss, nerve root injury, CSF leak, and the possibility of incomplete resolution of symptoms. We also discussed the possible risk of a nonunion and the potential need for additional surgery in the event of a pseudoarthrosis or hardware failure.    We elected to proceed with a staged operation.  Today we are performing an anterior decompression and fusion of L5-S1, it is planned that we will be returning to surgery for a second lateral procedure at a later date to address L4-5.  The L5-S1 level requires a separate stage as it is not accessible through a lateral approach.  The patient will also require a final posterior procedure involving a posterior spinal fusion with instrumentation spanning L4-S1.     Operative Procedure:     After obtaining informed consent and verifying the correct operative level, the patient was brought to the operating room and placed supine on an operating table. A general anesthetic was provided by the anesthesia service with the assistance of an endotracheal tube. Once this was appropriately positioned and secured, the anterior abdominal region was prepped and draped in usual sterile fashion. A surgical timeout was taken to confirm this was the correct patient, we were working at the correct level, and  that preoperative antibiotics were given in a timely fashion.     At this point, Dr. Simba Dyson D.O. provided vascular access to the L5-S1 level. He performed a left sided anterior retroperitoneal approach to the L5-S1 segment. Please see his separate dictated operative report regarding the details on the approach itself.  When I entered the procedure, self retaining retractors were already in position with excellent exposure of the L5-S1 disc space.     After confirming we were at the correct level using fluoroscopy, I used a long handle 10 blade scalpel to cut into the L5-S1 disc space. A Fisher elevator was used to remove disc material off of the endplates. Disc material was retrieved using pituitaries and Kerrisons. A disc space distractor was then placed into the L5-S1 disc space and I used curettes to remove posterior disc material. Kerrisons were used to remove posterior osteophytes across the endplates of L5 and S1. There was high-grade stenosis centrally but also foraminally especially on the left. I then performed a bilateral neural foraminotomy with Kerrisons and curettes. The decompression was much more involved than what is usually required for an anterior lumbar interbody fusion by itself and required significantly more time to perform.  This was due to the severe disc space collapse and high-grade foraminal stenosis, again worse on the left.     After the decompression was completed bilaterally and centrally, I used a series of endplate scrapers to prepare the endplates for interbody fusion. Trial spacers were then malleted into position and it was felt that an 8 mm posterior height PEEK spacer with 20 degrees lordosis from the [employer] instrumentation set would be the best fit to restore disc height also restoring foraminal height and providing some indirect decompression. The disc space was then thoroughly irrigated with saline solution.  Gelfoam powder with thrombin was used to control epidural  bleeding.     And 8 mm PEEK spacer from the [employer] instrumentation set was then packed as tightly as possible with an allograft bone matrix called Catalyst from [employer]. This spacer was then malleted into the L5-S1 disc space under fluoroscopic guidance. It was placed as an interbody biomechanical device to assist with fusion.  I then placed screws through the spacer into both the L5 and S1 vertebral bodies to help maintain position of the spacer as well as to assist with the fusion process.     A 4-hole anterior plate from the [facility] instrumentation set was then chosen. The 4 holes were drilled and four 30 mm screws were used to fix the plate across the L5-S1 segment augmenting the fusion.      We then inspected the entire operative field for any signs of bleeding.  Bleeding was once again controlled using thrombin with Gelfoam powder and bipolar cautery.  Once we ensured that adequate hemostasis was accomplished, final fluoroscopy imaging was taken to confirm adequate position of our implants. There was excellent restoration of disc height and the plate and screw construct appeared to be adequately positioned across L5-S1.     Please see Dr. Simba Dyson's separate dictated operative report regarding the closure of the wound. Once this was accomplished, the patient was extubated and sent to the recovery room in good stable condition. We estimated blood loss to be approximately 50 mL and the patient remained hemodynamically stable during the procedure.     Intraoperative neuro monitoring was ordered and carried out throughout the procedure to add an increased level of safety for the patient.  The interpreting physician was available by means of real-time continuous, bidirectional, remote audio and visual communication as needed throughout the entire procedure.  Modalities used during the procedure included SSEP, EEG, MEP, EMG, and TOF.  There were no neuro monitoring signal changes during the procedure.      Simba Dyson D.O. provided vascular access to the L5-S1 level and acted as a co-surgeon in this fashion.  Joesph Wallace PA-C provided critical assistance during the decompression at L5-S1 as well as during the placement of the PEEK spacer, bone graft and instrumentation to obtain a fusion across L5-S1.    CASSIE Garcia MD    Date: 12/19/2022  Time: 13:35 CST

## 2022-12-19 NOTE — OP NOTE
Manuel Moscoso  12/19/2022     PREOPERATIVE DIAGNOSIS:   1. Status post left L4-5 microdiskectomy, 11/18/2019.  2. Status post revision left L4-5 microdiskectomy, insertion annular closure device, 11/01/2021.  3. Increasing chronic low back pain.  4. Residual left buttock, thigh and leg radiculopathy.  5. Neurogenic claudication.  6. Multilevel lumbar degenerative disc disease, L2 to S1, severe L4 to S1.  7. Multilevel lumbar facet arthropathy, severe L4 to S1.  8. Degenerative scoliosis concave left L4-5.  9. Severe central and bilateral foraminal stenosis, L4 to S1, worse right L4-5, left L5-S1.  10. History of diabetic peripheral neuropathy.  11. History of alpha-gal syndrome after tick bite.     POSTOPERATIVE DIAGNOSIS: same     PROCEDURE PERFORMED:   1.  Anterior lumbar interbody fusion of L5-S1 with instrumentation     SURGEON: Simba Dyson DO   COSURGEON: Brayden Garcia MD     ANESTHESIA: General.    PREPARATION: Routine.    STAFF: Circulator: Leigh Phillips RN; Master Sosa RN  Scrub Person: Justyna Sherwood; Yudy Miller  Assistant: Michael Wallace PA    ESTIMATED BLOOD LOSS: 50 mL    SPECIMENS: None    COMPLICATIONS: None    INDICATIONS: Manuel Moscoso is a 62 y.o. male  who you are currently following for chronic back pain.  Manuel Chaparro scheduled for anterior lumbar interbody fusion of L5-S1 with Dr. Garcia on 12/5/22.  The patient denies any history of DVT. He has had 2 previous back surgeries. The indications, risks, and possible complications of the procedure were explained to the patient, who voiced understanding and wished to proceed with surgery.     PROCEDURE IN DETAIL:   The patient was taken to the operating room and placed on the operating table in a supine position. After general anesthesia was obtained, the abdomen was prepped and draped in a sterile manner.  A transverse incision was then made in the left lower quadrant.  Careful dissection was  made down through the subcutaneous tissues using the Bovie cautery to ensure hemostasis.  Any crossing veins were ligated with 3-0 silk suture and hemoclips.  The rectus fascia was identified.  It was incised with the Bovie cautery.  Kocher clamps are placed on each side of the rectus fascia and subfascial planes were established in a cephalad and caudad direction.  Once the subfascial planes were established the attention was then turned to the left rectus muscle.  Blunt mobilization was made of the left rectus muscle including its blood supply medially and to the right.  Once it was fully mobilized the attention was then turned laterally to the peritoneal reflection.  Entrance into the retroperitoneal space was established with the use of a sponge stick and the Bovie cautery.  Continued blunt mobilization was made with my hand using a finger sweeping motion moving the peritoneal contents and sacral fat pad medially and to the right.  Once it was fully mobilized the Brau-Sosa retractor system was set up.  The retractor blades were set in place.  The left iliac vein was then carefully dissected free and placed safely behind the retractor blade.  The sacral vessels were carefully taken down with hemoclips.  At this point full exposure was established of the L5-S1 disc space.  The next part of the case will be dictated by Dr. Garcia. Upon completion of Dr. Garcia's part of the case the wound bed was irrigated with antibiotic saline and hemostasis was observed.  The retractor blades were carefully taken out one at a time.  The structures were then placed back in their normal anatomic positions.  The rectus fascia was then closed with a #1 PDS in a running fashion to meet in the midline.  The deep layers were closed with a 2-0 Vicryl in a running fashion.  The subcutaneous layers were closed with a 3-0 Vicryl in a running fashion.  The skin was then reapproximated using a 4-0 Monocryl in a subcuticular fashion.   The wound was then cleaned.  Sterile dressings were applied. The patient tolerated the procedure well. Sponge and needle counts were correct. The patient was then awakened and extubated in the operating room and taken to the recovery room in good condition.    Simba Dyson,     Date: 12/19/2022 Time: 12:53 CST

## 2022-12-20 LAB
ANION GAP SERPL CALCULATED.3IONS-SCNC: 11 MMOL/L (ref 5–15)
BASOPHILS # BLD AUTO: 0.03 10*3/MM3 (ref 0–0.2)
BASOPHILS NFR BLD AUTO: 0.3 % (ref 0–1.5)
BUN SERPL-MCNC: 17 MG/DL (ref 8–23)
BUN/CREAT SERPL: 17.7 (ref 7–25)
CALCIUM SPEC-SCNC: 9.2 MG/DL (ref 8.6–10.5)
CHLORIDE SERPL-SCNC: 102 MMOL/L (ref 98–107)
CO2 SERPL-SCNC: 28 MMOL/L (ref 22–29)
CREAT SERPL-MCNC: 0.96 MG/DL (ref 0.76–1.27)
DEPRECATED RDW RBC AUTO: 40.8 FL (ref 37–54)
EGFRCR SERPLBLD CKD-EPI 2021: 89.9 ML/MIN/1.73
EOSINOPHIL # BLD AUTO: 0.14 10*3/MM3 (ref 0–0.4)
EOSINOPHIL NFR BLD AUTO: 1.3 % (ref 0.3–6.2)
ERYTHROCYTE [DISTWIDTH] IN BLOOD BY AUTOMATED COUNT: 11.9 % (ref 12.3–15.4)
GLUCOSE BLDC GLUCOMTR-MCNC: 133 MG/DL (ref 70–130)
GLUCOSE BLDC GLUCOMTR-MCNC: 169 MG/DL (ref 70–130)
GLUCOSE BLDC GLUCOMTR-MCNC: 330 MG/DL (ref 70–130)
GLUCOSE SERPL-MCNC: 146 MG/DL (ref 65–99)
HCT VFR BLD AUTO: 41.1 % (ref 37.5–51)
HGB BLD-MCNC: 13.9 G/DL (ref 13–17.7)
IMM GRANULOCYTES # BLD AUTO: 0.04 10*3/MM3 (ref 0–0.05)
IMM GRANULOCYTES NFR BLD AUTO: 0.4 % (ref 0–0.5)
LYMPHOCYTES # BLD AUTO: 1.51 10*3/MM3 (ref 0.7–3.1)
LYMPHOCYTES NFR BLD AUTO: 13.6 % (ref 19.6–45.3)
MCH RBC QN AUTO: 31.4 PG (ref 26.6–33)
MCHC RBC AUTO-ENTMCNC: 33.8 G/DL (ref 31.5–35.7)
MCV RBC AUTO: 92.8 FL (ref 79–97)
MONOCYTES # BLD AUTO: 0.87 10*3/MM3 (ref 0.1–0.9)
MONOCYTES NFR BLD AUTO: 7.8 % (ref 5–12)
NEUTROPHILS NFR BLD AUTO: 76.6 % (ref 42.7–76)
NEUTROPHILS NFR BLD AUTO: 8.53 10*3/MM3 (ref 1.7–7)
NRBC BLD AUTO-RTO: 0 /100 WBC (ref 0–0.2)
PLATELET # BLD AUTO: 222 10*3/MM3 (ref 140–450)
PMV BLD AUTO: 11 FL (ref 6–12)
POTASSIUM SERPL-SCNC: 4.1 MMOL/L (ref 3.5–5.2)
RBC # BLD AUTO: 4.43 10*6/MM3 (ref 4.14–5.8)
SODIUM SERPL-SCNC: 141 MMOL/L (ref 136–145)
WBC NRBC COR # BLD: 11.12 10*3/MM3 (ref 3.4–10.8)

## 2022-12-20 PROCEDURE — 97116 GAIT TRAINING THERAPY: CPT

## 2022-12-20 PROCEDURE — 85025 COMPLETE CBC W/AUTO DIFF WBC: CPT | Performed by: ORTHOPAEDIC SURGERY

## 2022-12-20 PROCEDURE — 25010000002 CEFAZOLIN PER 500 MG: Performed by: ORTHOPAEDIC SURGERY

## 2022-12-20 PROCEDURE — 63710000001 INSULIN LISPRO (HUMAN) PER 5 UNITS: Performed by: FAMILY MEDICINE

## 2022-12-20 PROCEDURE — 82962 GLUCOSE BLOOD TEST: CPT

## 2022-12-20 PROCEDURE — 80048 BASIC METABOLIC PNL TOTAL CA: CPT | Performed by: ORTHOPAEDIC SURGERY

## 2022-12-20 RX ORDER — BUPIVACAINE HCL/0.9 % NACL/PF 0.1 %
2 PLASTIC BAG, INJECTION (ML) EPIDURAL
Status: DISCONTINUED | OUTPATIENT
Start: 2022-12-21 | End: 2022-12-21 | Stop reason: HOSPADM

## 2022-12-20 RX ORDER — NICOTINE POLACRILEX 4 MG
15 LOZENGE BUCCAL
Status: DISCONTINUED | OUTPATIENT
Start: 2022-12-20 | End: 2022-12-24 | Stop reason: HOSPADM

## 2022-12-20 RX ORDER — DEXTROSE MONOHYDRATE 25 G/50ML
25 INJECTION, SOLUTION INTRAVENOUS
Status: DISCONTINUED | OUTPATIENT
Start: 2022-12-20 | End: 2022-12-24 | Stop reason: HOSPADM

## 2022-12-20 RX ORDER — CLINDAMYCIN PHOSPHATE 900 MG/50ML
900 INJECTION INTRAVENOUS ONCE
Status: CANCELLED | OUTPATIENT
Start: 2022-12-21

## 2022-12-20 RX ORDER — INSULIN LISPRO 100 [IU]/ML
2-7 INJECTION, SOLUTION INTRAVENOUS; SUBCUTANEOUS
Status: DISCONTINUED | OUTPATIENT
Start: 2022-12-20 | End: 2022-12-24 | Stop reason: HOSPADM

## 2022-12-20 RX ORDER — OXYCODONE HCL 20 MG/1
20 TABLET, FILM COATED, EXTENDED RELEASE ORAL ONCE
Status: CANCELLED | OUTPATIENT
Start: 2022-12-21

## 2022-12-20 RX ADMIN — DOCUSATE SODIUM 50 MG AND SENNOSIDES 8.6 MG 1 TABLET: 8.6; 5 TABLET, FILM COATED ORAL at 20:48

## 2022-12-20 RX ADMIN — METFORMIN HYDROCHLORIDE 1000 MG: 500 TABLET ORAL at 08:17

## 2022-12-20 RX ADMIN — INSULIN LISPRO 2 UNITS: 100 INJECTION, SOLUTION INTRAVENOUS; SUBCUTANEOUS at 17:22

## 2022-12-20 RX ADMIN — LOSARTAN POTASSIUM 25 MG: 50 TABLET, FILM COATED ORAL at 08:17

## 2022-12-20 RX ADMIN — HYDROCHLOROTHIAZIDE 12.5 MG: 25 TABLET ORAL at 12:36

## 2022-12-20 RX ADMIN — INSULIN LISPRO 5 UNITS: 100 INJECTION, SOLUTION INTRAVENOUS; SUBCUTANEOUS at 12:36

## 2022-12-20 RX ADMIN — CLONIDINE HYDROCHLORIDE 0.1 MG: 0.1 TABLET ORAL at 12:36

## 2022-12-20 RX ADMIN — CEFAZOLIN 2 G: 2 INJECTION, POWDER, FOR SOLUTION INTRAMUSCULAR; INTRAVENOUS at 02:56

## 2022-12-20 RX ADMIN — GABAPENTIN 300 MG: 300 CAPSULE ORAL at 20:47

## 2022-12-20 RX ADMIN — CLONIDINE HYDROCHLORIDE 0.1 MG: 0.1 TABLET ORAL at 20:47

## 2022-12-20 RX ADMIN — METFORMIN HYDROCHLORIDE 1000 MG: 500 TABLET ORAL at 17:22

## 2022-12-20 RX ADMIN — DOCUSATE SODIUM 50 MG AND SENNOSIDES 8.6 MG 1 TABLET: 8.6; 5 TABLET, FILM COATED ORAL at 08:17

## 2022-12-20 RX ADMIN — GABAPENTIN 300 MG: 300 CAPSULE ORAL at 17:22

## 2022-12-20 RX ADMIN — ATORVASTATIN CALCIUM 20 MG: 10 TABLET, FILM COATED ORAL at 20:47

## 2022-12-20 RX ADMIN — GABAPENTIN 300 MG: 300 CAPSULE ORAL at 08:17

## 2022-12-20 RX ADMIN — Medication 3 ML: at 20:48

## 2022-12-20 NOTE — THERAPY TREATMENT NOTE
Acute Care - Physical Therapy Treatment Note  [facility]     Patient Name: Manuel Moscoso  : 1960  MRN: [id number]  Today's Date: 2022      Visit Dx:     ICD-10-CM ICD-9-CM   1. Chronic bilateral low back pain without sciatica  M54.50 724.2    G89.29 338.29   2. Impaired mobility  Z74.09 799.89     Patient Active Problem List   Diagnosis   • Lumbosacral disc disease   • Chronic bilateral low back pain without sciatica   • Lumbago of multiple sites in spine with sciatica   • Type 2 diabetes mellitus with hyperglycemia, without long-term current use of insulin (HCC)   • Essential hypertension   • Drug-induced constipation     Past Medical History:   Diagnosis Date   • Allergy to alpha-gal    • Arthritis    • Chronic bilateral low back pain without sciatica 2022   • Diabetes mellitus (HCC)    • Hearing loss, left    • History of staph infection    • Hypertension    • Low back pain    • Lumbosacral disc disease 2022   • Psoriasis    • Sleep apnea    • Tinnitus    • Vision disturbance     using rx eyedrops     Past Surgical History:   Procedure Laterality Date   • BACK SURGERY      x 2   • CHOLECYSTECTOMY     • COLONOSCOPY     • GANGLION CYST EXCISION Left     wrist   • LIPOMA EXCISION      back     PT Assessment (last 12 hours)     PT Evaluation and Treatment     Row Name 22 15122 0830       Physical Therapy Time and Intention    Subjective Information no complaints  -KJ no complaints  -KJ    Document Type therapy note (daily note)  -KJ therapy note (daily note)  -KJ    Mode of Treatment physical therapy  -KJ physical therapy  -KJ    Patient Effort good  -KJ good  -KJ    Row Name 22 15122 0830       General Information    Existing Precautions/Restrictions fall;LSO;spinal  -KJ fall;LSO;spinal  -KJ    Row Name 22 1512 22 0830       Pain    Pretreatment Pain Rating 3/10  -KJ 3/10  -KJ    Posttreatment Pain Rating 4/10  -KJ 4/10  -KJ    Pain Location  incisional  -KJ incisional  -KJ    Pain Location - abdomen  -KJ abdomen  -KJ    Row Name 12/20/22 1512 12/20/22 0830       Bed Mobility    Sidelying-Sit [place] (Bed Mobility) independent  -KJ independent  -KJ    Row Name 12/20/22 1512 12/20/22 0830       Sit-Stand Transfer    Sit-Stand [place] (Transfers) independent  -KJ independent  -KJ    Row Name 12/20/22 1512 12/20/22 0830       Stand-Sit Transfer    Stand-Sit [place] (Transfers) independent  -KJ independent  -KJ    Row Name 12/20/22 1512 12/20/22 0830       Gait/Stairs (Locomotion)    [place] Level (Gait) supervision  -KJ supervision  -KJ    Distance in Feet (Gait) 350  -  -KJ    Row Name             Wound 12/19/22 1109 Left lower abdomen Incision    Wound - Properties Group Placement Date: 12/19/22  -DT Placement Time: 1109  -DT Present on Hospital Admission: N  -DT Side: Left  -DT Orientation: lower  -DT Location: abdomen  -DT Primary Wound Type: Incision  -DT    Retired Wound - Properties Group Placement Date: 12/19/22  -DT Placement Time: 1109  -DT Present on Hospital Admission: N  -DT Side: Left  -DT Orientation: lower  -DT Location: abdomen  -DT Primary Wound Type: Incision  -DT    Retired Wound - Properties Group Date first assessed: 12/19/22  -DT Time first assessed: 1109  -DT Present on Hospital Admission: N  -DT Side: Left  -DT Location: abdomen  -DT Primary Wound Type: Incision  -DT    Row Name 12/20/22 1512 12/20/22 0830       Positioning and Restraints    Pre-Treatment Position sitting in chair/recliner  -KJ in bed  -KJ    Post Treatment Position chair  -KJ bed  -KJ    In Bed call light within reach  -KJ call light within reach  -KJ          User Key  (r) = Recorded By, (t) = Taken By, (c) = Cosigned By    Initials Name Provider Type    Wendy Dawson, SHAD Physical Therapist Assistant    Master Coronado, RN Registered Nurse                Physical Therapy Education     Title: PT OT SLP Therapies (In Progress)      Topic: Physical Therapy (In Progress)     Point: Mobility training (In Progress)     Learning Progress Summary           Patient Acceptance, E, NR by DOUG at 12/19/2022 1449    Comment: Benefits of activity, progression of PT, back precautions, LSO use/radha/doff                   Point: Home exercise program (Not Started)     Learner Progress:  Not documented in this visit.          Point: Body mechanics (Not Started)     Learner Progress:  Not documented in this visit.          Point: Precautions (In Progress)     Learning Progress Summary           Patient Acceptance, E, NR by DOUG at 12/19/2022 1449    Comment: Benefits of activity, progression of PT, back precautions, LSO use/radha/doff                               User Key     Initials Effective Dates Name Provider Type Discipline    DOUG 08/02/16 -  Rakesh Cheung, PT DPT Physical Therapist PT              PT Recommendation and Plan     Plan of Care Reviewed With: patient  Progress: improving  Outcome Evaluation: PT tx completed. Pt c/o minimal pn, more in L LE and weakness. I bed mobility and transfers. Amb 250' S. I don/doffing LSO. Reinforced BLT's.   Outcome Measures     Row Name 12/20/22 1000             How much help from another person do you currently need...    Turning from your back to your side while in flat bed without using bedrails? 4  -KJ      Moving from lying on back to sitting on the side of a flat bed without bedrails? 4  -KJ      Moving to and from a bed to a chair (including a wheelchair)? 4  -KJ      Standing up from a chair using your arms (e.g., wheelchair, bedside chair)? 4  -KJ      Climbing 3-5 steps with a railing? 4  -KJ      To walk in hospital room? 4  -KJ      AM-PAC 6 Clicks Score (PT) 24  -KJ         Functional Assessment    Outcome Measure Options AM-PAC 6 Clicks Basic Mobility (PT)  -KJ            User Key  (r) = Recorded By, (t) = Taken By, (c) = Cosigned By    Initials Name Provider Type    Wendy Dawson, PTA Physical  Therapist Assistant                 Time Calculation:    PT Charges     Row Name 12/20/22 1512 12/20/22 1019          Time Calculation    Start Time 1512  -KJ 0830  -KJ     Stop Time 1524  -KJ 0853  -KJ     Time Calculation (min) 12 min  -KJ 23 min  -KJ     PT Received On -- 12/20/22  -KJ     PT Goal Re-Cert Due Date -- 12/29/22  -KJ        Time Calculation- PT    Total Timed Code Minutes-  minute(s)  -KJ 23 minute(s)  -KJ           User Key  (r) = Recorded By, (t) = Taken By, (c) = Cosigned By    Initials Name Provider Type    Wendy Dawson PTA Physical Therapist Assistant              Therapy Charges for Today     Code Description Service Date Service Provider Modifiers Qty    08411384939 HC GAIT TRAINING EA 15 MIN 12/20/2022 Wendy Valentin PTA GP 2    88500377704 HC GAIT TRAINING EA 15 MIN 12/20/2022 Wendy Valentin PTA GP 1          PT G-Codes  Outcome Measure Options: AM-PAC 6 Clicks Basic Mobility (PT)  AM-PAC 6 Clicks Score (PT): 24  AM-PAC 6 Clicks Score (OT): 22    Wendy Valentin PTA  12/20/2022

## 2022-12-20 NOTE — PROGRESS NOTES
[facility]  Spine Surgery  TING Whelan   Progress Note        Subjective/Overnight Events:  Feeling well this AM, no acute issues, pain controlled, voiding, up ad clarisse.    Vitals  Vitals:    12/19/22 1513 12/19/22 1922 12/19/22 2300 12/20/22 0307   BP:  151/73 130/56 128/62   BP Location:  Left arm Left arm Left arm   Patient Position:  Lying Lying Lying   Pulse:  70 65 63   Resp:  18 16 16   Temp: Comment: Pt working with PT 97.5 °F (36.4 °C) 97.9 °F (36.6 °C) 97.7 °F (36.5 °C)   TempSrc:  Oral Oral Oral   SpO2:  100% 100% 100%   Weight:       Height:           Current Facility-Administered Medications   Medication Dose Route Frequency Provider Last Rate Last Admin   • acetaminophen (TYLENOL) tablet 650 mg  650 mg Oral Q4H PRN CHARITO Garcia MD       • atorvastatin (LIPITOR) tablet 20 mg  20 mg Oral Daily CHARITO Garcia MD   20 mg at 12/19/22 1521   • [START ON 12/21/2022] ceFAZolin in 0.9% normal saline (ANCEF) IVPB solution 2 g  2 g Intravenous On Call to OR Michael Wallace PA       • cloNIDine (CATAPRES) tablet 0.1 mg  0.1 mg Oral TID CHARITO Garcia MD   0.1 mg at 12/19/22 2013   • cyclobenzaprine (FLEXERIL) tablet 10 mg  10 mg Oral TID PRN CHARITO Garcia MD       • gabapentin (NEURONTIN) capsule 300 mg  300 mg Oral TID CHARITO Garcia MD   300 mg at 12/19/22 2013   • glipizide (GLUCOTROL) tablet 5 mg  5 mg Oral Daily CHARITO Garcia MD   5 mg at 12/19/22 1521   • hydroCHLOROthiazide (HYDRODIURIL) tablet 12.5 mg  12.5 mg Oral Daily CHARITO Garcia MD   12.5 mg at 12/19/22 1521   • HYDROmorphone (DILAUDID) injection 1 mg  1 mg Intravenous Q3H PRN CHARITO Garcia MD        And   • naloxone (NARCAN) injection 0.4 mg  0.4 mg Intravenous Q5 Min PRN CHARITO Garcia MD       • losartan (COZAAR) tablet 25 mg  25 mg Oral Daily CHARITO Garcia MD   25 mg at 12/19/22 1521   • metFORMIN (GLUCOPHAGE) tablet 1,000 mg  1,000 mg  Oral BID With Meals CHARITO Garcia MD   1,000 mg at 12/19/22 1755   • ondansetron (ZOFRAN) tablet 4 mg  4 mg Oral Q6H PRN CHARITO Garcia MD        Or   • ondansetron (ZOFRAN) injection 4 mg  4 mg Intravenous Q6H PRN CHARITO Garcia MD       • oxyCODONE-acetaminophen (PERCOCET) 7.5-325 MG per tablet 1 tablet  1 tablet Oral Q4H PRN CHARITO Garcia MD       • oxyCODONE-acetaminophen (PERCOCET) 7.5-325 MG per tablet 2 tablet  2 tablet Oral Q4H PRN CHARITO Garcia MD       • polyethylene glycol (MIRALAX) packet 17 g  17 g Oral Daily PRN CHARITO Garcia MD       • sennosides-docusate (PERICOLACE) 8.6-50 MG per tablet 1 tablet  1 tablet Oral BID CHARITO Garcia MD   1 tablet at 12/19/22 2013   • sodium chloride 0.9 % flush 10 mL  10 mL Intravenous PRN CHARITO Garcia MD   10 mL at 12/19/22 2013   • sodium chloride 0.9 % flush 3 mL  3 mL Intravenous Q12H CHARITO Garcia MD   3 mL at 12/19/22 2048   • sodium chloride 0.9 % infusion 40 mL  40 mL Intravenous PRN CHARITO Garcia MD       • sodium chloride 0.9 % infusion  100 mL/hr Intravenous Continuous CHARITO Garcia  mL/hr at 12/19/22 1522 100 mL/hr at 12/19/22 1522       PHYSICAL EXAM:    Orientation:  alert and oriented to person, place, and time    Incision:  no significant drainage    Upper Extremity Motor :  5/5 bilaterally    Upper Motor Neuron Signs:  none     Lower Extremity Motor :  equal bilaterally    Lower Extremity Sensory:  Tibial nerve: Intact, Superficial peroneal nerve: Intact and Deep peroneal nerve: Intact    Flatus:  flatus    ABNORMAL EXAM FINDINGS:  none    LABS:    Lab Results (last 24 hours)     Procedure Component Value Units Date/Time    POC Glucose Once [[id number]]  (Abnormal) Collected: 12/19/22 1304    Specimen: Blood Updated: 12/19/22 1315     Glucose 139 mg/dL      Comment: : [id number] Prema Orellana ID: [id number]       POC Glucose Once [[id number]]  (Normal) Collected: 12/19/22 0870     Specimen: Blood Updated: 12/19/22 0902     Glucose 106 mg/dL      Comment: : [id number] [facility]: [id number]             ASSESSMENT AND PLAN:    Post operative day 1 status post ALIF L5/S1    1:  Activity Level:  Up with PT/OT, ad clarisse  2:  Pain Control:  Oral analgesia  3:  Discharge Planning:  Pending completion of staged procedures   4:  Other:  NPO after midnight, ABX on call to OR      Electronically signed by TING Whelan 12/20/2022 07:08 CST

## 2022-12-20 NOTE — PLAN OF CARE
Goal Outcome Evaluation:  Plan of Care Reviewed With: patient        Progress: improving  Outcome Evaluation: Pt has slept well inbetween care.  No neurovascular changes.  No c/o pain.  Self turning in bed.  Upon first meeting pt he was wearing his LSO brace while asleep in bed.  When I asked him if he was told to wear it at all times he stated he had not been told when to wear it.  Brace removed and education provided.

## 2022-12-20 NOTE — CONSULTS
Consult Note    Referring Provider: Dr. Garcia  Reason for Consultation: Medical management    Patient Care Team:  Provider, No Known as PCP - General    Chief complaint chronic low back pain    Subjective .     History of present illness:  The patient presents today for surgical correction after failing conservative management.  Outpatient work-up has been reviewed through provided outpatient notes per attending.  They have been through anti-inflammatories, muscle relaxers, and pain medication.  The pain has progressed to the point in time where it is affecting their activities of daily living and after being explained all their options, elected to undergo surgical correction.  Their primary care physician does not attend here at [facility]; therefore, I have been asked to take care of their primary medical needs in the perioperative period.  The postoperative pain is as expected.  There are no other precipitating or relieving factors. I have been requested by the Attending Physician to provide Medical Consultation in the perioperative period. The patient understands my role in their hospitalization and agrees with my treatment plan. They understand the importance of follow up with their PCP upon discharge  from [facility] for any concerns or abnormalities.  All questions were encouraged and answered to the best of my ability.    Pleasant 62-year-old gentleman that presents to Dr. Garcia's service for post op day #1 from a left with expected posterior aspect of surgery tomorrow.  Patient has type 2 diabetes and psoriasis but has been well controlled.  Blood sugar was 106 this morning.  Patient triggered positive for sepsis with a white count of 11,000 but there are no signs of infection suspect this is all stress related.      REVIEW OF SYSTEMS:    CONSTITUTIONAL:  Negative for anorexia, chills, fevers, night sweats and weight loss  EYES:  negative for eye dryness, icterus and redness  HEENT:    negative for dental problems, epistaxis, facial trauma and thrush  RESPIRATORY:  negative for chest tightness, cough, dyspnea on exertion, pneumonia and sputum  CARDIOVASCULAR: negative for chest pain, dyspnea, exertional chest pressure/discomfort, irregular heart beat, palpitations, paroxysmal nocturnal dyspnea and syncope  GASTROINTESTINAL:  negative for abdominal pain, hematemesis, jaundice, melena and rectal bleeding. No significant changes in bowel habits preoperatively.   MUSCULOSKELETAL:  negative for muscle weakness, myalgias and neck pain, outside of surgical issues noted above  NEUROLOGICAL:   negative for dizziness, headaches, seizures, speech problems, tremors and vertigo  INTEGUMENT: negative for pruritus, rash, skin color change and skin lesion(s)         History    Past Medical History:   Diagnosis Date   • Allergy to alpha-gal    • Arthritis    • Chronic bilateral low back pain without sciatica 2022   • Diabetes mellitus (HCC)    • Hearing loss, left    • History of staph infection    • Hypertension    • Low back pain    • Lumbosacral disc disease 2022   • Psoriasis    • Sleep apnea    • Tinnitus    • Vision disturbance     using rx eyedrops     Past Surgical History:   Procedure Laterality Date   • BACK SURGERY      x 2   • CHOLECYSTECTOMY     • COLONOSCOPY     • GANGLION CYST EXCISION Left     wrist   • LIPOMA EXCISION      back     History reviewed. No pertinent family history.  Social History     Tobacco Use   • Smoking status: Former     Types: Cigarettes     Quit date:      Years since quittin.9   • Smokeless tobacco: Never   Vaping Use   • Vaping Use: Never used   Substance Use Topics   • Alcohol use: Yes     Comment: rarely   • Drug use: Never     Medications Prior to Admission   Medication Sig Dispense Refill Last Dose   • aspirin 81 MG EC tablet Take 81 mg by mouth Daily.   2022 at 1400   • atorvastatin (LIPITOR) 20 MG tablet Take 20 mg by mouth Daily.    12/17/2022   • cloNIDine (CATAPRES) 0.1 MG tablet Take 0.1 mg by mouth 3 (Three) Times a Day.   12/17/2022   • gabapentin (NEURONTIN) 300 MG capsule Take 300 mg by mouth 3 (Three) Times a Day.   12/19/2022 at 0400   • glipizide (GLUCOTROL) 5 MG tablet Take 5 mg by mouth Daily.   12/17/2022   • hydroCHLOROthiazide (HYDRODIURIL) 12.5 MG tablet Take 12.5 mg by mouth Daily.   12/18/2022 at 1100   • losartan (COZAAR) 25 MG tablet Take 25 mg by mouth Daily.   12/18/2022 at 0700   • metFORMIN (GLUCOPHAGE) 1000 MG tablet Take 1,000 mg by mouth 2 (Two) Times a Day With Meals.   12/18/2022 at 1700   • etanercept (ENBREL) 50 MG/ML solution prefilled syringe injection Inject 50 mg under the skin into the appropriate area as directed 1 (One) Time Per Week.   More than a month       Allergies:  Alpha-gal and Penicillins    Objective     Vital Signs   Temp:  [96.7 °F (35.9 °C)-97.9 °F (36.6 °C)] 97.7 °F (36.5 °C)  Heart Rate:  [52-81] 63  Resp:  [10-18] 16  BP: ()/(53-86) 128/62          Physical Exam:  Constitutional: oriented to person, place, and time. appears well-developed.   Head: Normocephalic and atraumatic.   Eyes: Pupils are equal, round, and reactive to light.  No icterus or erythema  Neck: Neck supple.  Without masses or carotid bruit  Cardiovascular: Regular rhythm and normal heart sounds.  No significant lift, rub or murmur noted  Pulmonary/Chest: Effort normal and breath sounds normal. CTAB, encourage deep breathing.  Abdominal: Soft. Bowel sounds are normal to hypoactive. No significant distension. There is no rebound and no guarding.   Musculoskeletal: Normal range of motion and no edema or tenderness outside of surgical area.   Neurological: Pt is alert and oriented to person, place, and time.  normal reflexes present.  Somewhat sedated from anesthesia and pain medicine noted  Skin: Skin is warm and dry.  No new rashes of concern.    Results Review:   I reviewed the patient's new imaging results and agree  with the interpretation.      Assessment & Plan       Lumbago of multiple sites in spine with sciatica    Lumbosacral disc disease    Chronic bilateral low back pain without sciatica    Type 2 diabetes mellitus with hyperglycemia, without long-term current use of insulin (HCC)    Essential hypertension    Drug-induced constipation      Type 2 DM- review labs and home medication. Discuss with patient importance of blood sugar control in the healing process. Review diet, medical,and lifestyle modifications for optimal medical treatment. Will restart their regular diabetic regimen and make consult for diabetic education / dietician available upon request.  Will restart his Glucophage and hold his Glucotrol while he is n.p.o. for surgery and place him on low-dose sliding scale correction insulin dose.    Hypertension-review pre and post BP's,will restart home BP meds when BP allows. Recent studies demonstrate the need for patience and less reactivity for precipitous drop of BP in the acute care setting. Will educate staff to use other modalities to help reduce MAP prior to adding additional medical interventions. Add clonidine 0.1 mg every 4 hours prn if bp> 140/90,monitor BP and adjust meds as necessary.      Anemia post-op as expected.  Will check iron, B12,and folate if significant. Replenish substrates as needed.Transfuse at acceptable levels depending on clinical judgement and comorbidities.  We will check periodically throughout hospitalization and educate patient about following up with their PCP to ensure levels returned to normal.      Constipation-educate patient about the ill effects of anesthesia and narcotic pain medication on the normal peristalsis of the gut.  Encourage judicious use of pain medication and early ambulation to aid in bowel functioning returning to normal.  We will start with Miralax 1 capful BID until BM, then decrease to 1 x a day,then can step up to a prokinetic which can be used for  opioid induced constipation,and ultimately end up with Relistor 12 mcg subq every 48 hours to block the effect of narcotics on the gut.  Our plan is to soften the stools so after surgical intervention if stimulation is needed with magnesium citrate and or Dulcolax suppository the stool will move much easier.  Encourage water consumption to help soften the stool as well.    Patient triggered positive for sepsis there is no signs of infection his white count slightly elevated 11,000 this is mostly a stress reaction.      Explained to patient and staff that we were consulted for medical management during their acute care hospitalization. The Medical Consultation was requested by the Attending Physician. The patient has been recommended to f/u with their regular primary care provider concerning any further treatment and review of abnormalities found during their hospitalization at [facility].They agree with the treatment plan as well as understand our role in their hospitalization. All questions were encouraged and answered to best of my ability.    I discussed the patient's findings and my recommendations with patient, nursing staff and consulting provider    Adolph Chao MD  12/20/22  07:49 CST

## 2022-12-20 NOTE — THERAPY TREATMENT NOTE
Acute Care - Physical Therapy Treatment Note  [facility]     Patient Name: Manuel Moscoso  : 1960  MRN: [id number]  Today's Date: 2022      Visit Dx:     ICD-10-CM ICD-9-CM   1. Chronic bilateral low back pain without sciatica  M54.50 724.2    G89.29 338.29   2. Impaired mobility  Z74.09 799.89     Patient Active Problem List   Diagnosis   • Lumbosacral disc disease   • Chronic bilateral low back pain without sciatica   • Lumbago of multiple sites in spine with sciatica   • Type 2 diabetes mellitus with hyperglycemia, without long-term current use of insulin (HCC)   • Essential hypertension   • Drug-induced constipation     Past Medical History:   Diagnosis Date   • Allergy to alpha-gal    • Arthritis    • Chronic bilateral low back pain without sciatica 2022   • Diabetes mellitus (HCC)    • Hearing loss, left    • History of staph infection    • Hypertension    • Low back pain    • Lumbosacral disc disease 2022   • Psoriasis    • Sleep apnea    • Tinnitus    • Vision disturbance     using rx eyedrops     Past Surgical History:   Procedure Laterality Date   • BACK SURGERY      x 2   • CHOLECYSTECTOMY     • COLONOSCOPY     • GANGLION CYST EXCISION Left     wrist   • LIPOMA EXCISION      back     PT Assessment (last 12 hours)     PT Evaluation and Treatment     Row Name 22 0806          Physical Therapy Time and Intention    Subjective Information no complaints  -KJ     Document Type therapy note (daily note)  -KJ     Mode of Treatment physical therapy  -KJ     Patient Effort good  -KJ     Row Name 22 0830          General Information    Existing Precautions/Restrictions fall;LSO;spinal  -KJ     Row Name 22 0830          Pain    Pretreatment Pain Rating 3/10  -KJ     Posttreatment Pain Rating 4/10  -KJ     Pain Location incisional  -KJ     Pain Location - abdomen  -KJ     Row Name 22 0830          Bed Mobility    Sidelying-Sit [place] (Bed Mobility)  independent  -KJ     Row Name 12/20/22 0830          Sit-Stand Transfer    Sit-Stand [place] (Transfers) independent  -KJ     Row Name 12/20/22 0830          Stand-Sit Transfer    Stand-Sit [place] (Transfers) independent  -KJ     Row Name 12/20/22 0830          Gait/Stairs (Locomotion)    [place] Level (Gait) supervision  -KJ     Distance in Feet (Gait) 250  -KJ     Row Name             Wound 12/19/22 1109 Left lower abdomen Incision    Wound - Properties Group Placement Date: 12/19/22  -DT Placement Time: 1109  -DT Present on Hospital Admission: N  -DT Side: Left  -DT Orientation: lower  -DT Location: abdomen  -DT Primary Wound Type: Incision  -DT    Retired Wound - Properties Group Placement Date: 12/19/22  -DT Placement Time: 1109  -DT Present on Hospital Admission: N  -DT Side: Left  -DT Orientation: lower  -DT Location: abdomen  -DT Primary Wound Type: Incision  -DT    Retired Wound - Properties Group Date first assessed: 12/19/22  -DT Time first assessed: 1109  -DT Present on Hospital Admission: N  -DT Side: Left  -DT Location: abdomen  -DT Primary Wound Type: Incision  -DT    Row Name 12/20/22 0830          Positioning and Restraints    Pre-Treatment Position in bed  -KJ     Post Treatment Position bed  -KJ     In Bed call light within reach  -KJ           User Key  (r) = Recorded By, (t) = Taken By, (c) = Cosigned By    Initials Name Provider Type    Wendy Dawson, PTA Physical Therapist Assistant    Master Coronado, RN Registered Nurse                Physical Therapy Education     Title: PT OT SLP Therapies (In Progress)     Topic: Physical Therapy (In Progress)     Point: Mobility training (In Progress)     Learning Progress Summary           Patient Acceptance, E, NR by DOUG at 12/19/2022 4850    Comment: Benefits of activity, progression of PT, back precautions, LSO use/radha/doff                   Point: Home exercise program (Not Started)     Learner Progress:  Not documented in  this visit.          Point: Body mechanics (Not Started)     Learner Progress:  Not documented in this visit.          Point: Precautions (In Progress)     Learning Progress Summary           Patient Acceptance, E, NR by DOUG at 12/19/2022 1226    Comment: Benefits of activity, progression of PT, back precautions, LSO use/radha/doff                               User Key     Initials Effective Dates Name Provider Type Discipline    DOUG 08/02/16 -  ORakesh Kasper, PT DPT Physical Therapist PT              PT Recommendation and Plan     Plan of Care Reviewed With: patient  Progress: improving  Outcome Evaluation: PT tx completed. Pt c/o minimal pn, more in L LE and weakness. I bed mobility and transfers. Amb 250' S. I don/doffing LSO. Reinforced BLT's.   Outcome Measures     Row Name 12/20/22 1000             How much help from another person do you currently need...    Turning from your back to your side while in flat bed without using bedrails? 4  -KJ      Moving from lying on back to sitting on the side of a flat bed without bedrails? 4  -KJ      Moving to and from a bed to a chair (including a wheelchair)? 4  -KJ      Standing up from a chair using your arms (e.g., wheelchair, bedside chair)? 4  -KJ      Climbing 3-5 steps with a railing? 4  -KJ      To walk in hospital room? 4  -KJ      AM-PAC 6 Clicks Score (PT) 24  -KJ         Functional Assessment    Outcome Measure Options AM-PAC 6 Clicks Basic Mobility (PT)  -KJ            User Key  (r) = Recorded By, (t) = Taken By, (c) = Cosigned By    Initials Name Provider Type    Wendy Dawson, PTA Physical Therapist Assistant                 Time Calculation:    PT Charges     Row Name 12/20/22 1019             Time Calculation    Start Time 0830  -KJ      Stop Time 0853  -KJ      Time Calculation (min) 23 min  -KJ      PT Received On 12/20/22  -KJ      PT Goal Re-Cert Due Date 12/29/22  -KJ         Time Calculation- PT    Total Timed Code Minutes- PT 23 minute(s)   -ELOISA            User Key  (r) = Recorded By, (t) = Taken By, (c) = Cosigned By    Initials Name Provider Type    Wendy Dawson, PTA Physical Therapist Assistant              Therapy Charges for Today     Code Description Service Date Service Provider Modifiers Qty    [id number] HC GAIT TRAINING EA 15 MIN 12/20/2022 Wendy Valentin PTA GP 2          PT G-Codes  Outcome Measure Options: AM-PAC 6 Clicks Basic Mobility (PT)  AM-PAC 6 Clicks Score (PT): 24  AM-PAC 6 Clicks Score (OT): 22    Wendy Valentin PTA  12/20/2022

## 2022-12-21 ENCOUNTER — ANESTHESIA (OUTPATIENT)
Dept: PERIOP | Facility: HOSPITAL | Age: 62
DRG: 455 | End: 2022-12-21
Payer: COMMERCIAL

## 2022-12-21 ENCOUNTER — APPOINTMENT (OUTPATIENT)
Dept: GENERAL RADIOLOGY | Facility: HOSPITAL | Age: 62
DRG: 455 | End: 2022-12-21
Payer: COMMERCIAL

## 2022-12-21 ENCOUNTER — ANESTHESIA EVENT (OUTPATIENT)
Dept: PERIOP | Facility: HOSPITAL | Age: 62
DRG: 455 | End: 2022-12-21
Payer: COMMERCIAL

## 2022-12-21 LAB
GLUCOSE BLDC GLUCOMTR-MCNC: 122 MG/DL (ref 70–130)
GLUCOSE BLDC GLUCOMTR-MCNC: 136 MG/DL (ref 70–130)
GLUCOSE BLDC GLUCOMTR-MCNC: 153 MG/DL (ref 70–130)

## 2022-12-21 PROCEDURE — 82962 GLUCOSE BLOOD TEST: CPT

## 2022-12-21 PROCEDURE — 0SG00A0 FUSION OF LUMBAR VERTEBRAL JOINT WITH INTERBODY FUSION DEVICE, ANTERIOR APPROACH, ANTERIOR COLUMN, OPEN APPROACH: ICD-10-PCS | Performed by: ORTHOPAEDIC SURGERY

## 2022-12-21 PROCEDURE — 0SB20ZZ EXCISION OF LUMBAR VERTEBRAL DISC, OPEN APPROACH: ICD-10-PCS | Performed by: ORTHOPAEDIC SURGERY

## 2022-12-21 PROCEDURE — 97116 GAIT TRAINING THERAPY: CPT | Performed by: PHYSICAL THERAPIST

## 2022-12-21 PROCEDURE — 25010000002 ONDANSETRON PER 1 MG

## 2022-12-21 PROCEDURE — 4A11X4G MONITORING OF PERIPHERAL NERVOUS ELECTRICAL ACTIVITY, INTRAOPERATIVE, EXTERNAL APPROACH: ICD-10-PCS | Performed by: ORTHOPAEDIC SURGERY

## 2022-12-21 PROCEDURE — C1713 ANCHOR/SCREW BN/BN,TIS/BN: HCPCS | Performed by: ORTHOPAEDIC SURGERY

## 2022-12-21 PROCEDURE — 25010000002 DROPERIDOL PER 5 MG: Performed by: ANESTHESIOLOGY

## 2022-12-21 PROCEDURE — 63710000001 INSULIN LISPRO (HUMAN) PER 5 UNITS: Performed by: ORTHOPAEDIC SURGERY

## 2022-12-21 PROCEDURE — 76000 FLUOROSCOPY <1 HR PHYS/QHP: CPT

## 2022-12-21 PROCEDURE — 25010000002 FENTANYL CITRATE (PF) 100 MCG/2ML SOLUTION

## 2022-12-21 PROCEDURE — 72100 X-RAY EXAM L-S SPINE 2/3 VWS: CPT

## 2022-12-21 PROCEDURE — 97164 PT RE-EVAL EST PLAN CARE: CPT | Performed by: PHYSICAL THERAPIST

## 2022-12-21 PROCEDURE — 25010000002 HYDROMORPHONE PER 4 MG: Performed by: ANESTHESIOLOGY

## 2022-12-21 PROCEDURE — 25010000002 PROPOFOL 10 MG/ML EMULSION

## 2022-12-21 PROCEDURE — 25010000002 FENTANYL CITRATE (PF) 250 MCG/5ML SOLUTION

## 2022-12-21 DEVICE — SEAL HEMO SURG ARISTA/AH ABS/PWDR 3GM: Type: IMPLANTABLE DEVICE | Site: SPINE LUMBAR | Status: FUNCTIONAL

## 2022-12-21 DEVICE — PLT XLIF AMS XLP 2/SD TI 8MM: Type: IMPLANTABLE DEVICE | Site: SPINE LUMBAR | Status: FUNCTIONAL

## 2022-12-21 DEVICE — SCRW XLF COROENT VAR TI 5.5X35MM: Type: IMPLANTABLE DEVICE | Site: SPINE LUMBAR | Status: FUNCTIONAL

## 2022-12-21 DEVICE — SPACR FUSN COHERE XLW INTER/VERT XLW PEEK 10DEG 12X22X55MM: Type: IMPLANTABLE DEVICE | Site: SPINE LUMBAR | Status: FUNCTIONAL

## 2022-12-21 RX ORDER — FENTANYL CITRATE 50 UG/ML
INJECTION, SOLUTION INTRAMUSCULAR; INTRAVENOUS AS NEEDED
Status: DISCONTINUED | OUTPATIENT
Start: 2022-12-21 | End: 2022-12-21 | Stop reason: SURG

## 2022-12-21 RX ORDER — SODIUM CHLORIDE, SODIUM LACTATE, POTASSIUM CHLORIDE, CALCIUM CHLORIDE 600; 310; 30; 20 MG/100ML; MG/100ML; MG/100ML; MG/100ML
1000 INJECTION, SOLUTION INTRAVENOUS CONTINUOUS
Status: DISCONTINUED | OUTPATIENT
Start: 2022-12-21 | End: 2022-12-22

## 2022-12-21 RX ORDER — CLINDAMYCIN PHOSPHATE 900 MG/50ML
900 INJECTION INTRAVENOUS ONCE
Status: COMPLETED | OUTPATIENT
Start: 2022-12-21 | End: 2022-12-21

## 2022-12-21 RX ORDER — ROCURONIUM BROMIDE 10 MG/ML
INJECTION, SOLUTION INTRAVENOUS AS NEEDED
Status: DISCONTINUED | OUTPATIENT
Start: 2022-12-21 | End: 2022-12-21 | Stop reason: SURG

## 2022-12-21 RX ORDER — PROPOFOL 10 MG/ML
VIAL (ML) INTRAVENOUS AS NEEDED
Status: DISCONTINUED | OUTPATIENT
Start: 2022-12-21 | End: 2022-12-21 | Stop reason: SURG

## 2022-12-21 RX ORDER — DROPERIDOL 2.5 MG/ML
0.62 INJECTION, SOLUTION INTRAMUSCULAR; INTRAVENOUS ONCE AS NEEDED
Status: COMPLETED | OUTPATIENT
Start: 2022-12-21 | End: 2022-12-21

## 2022-12-21 RX ORDER — HYDROMORPHONE HYDROCHLORIDE 1 MG/ML
0.5 INJECTION, SOLUTION INTRAMUSCULAR; INTRAVENOUS; SUBCUTANEOUS
Status: DISCONTINUED | OUTPATIENT
Start: 2022-12-21 | End: 2022-12-24 | Stop reason: HOSPADM

## 2022-12-21 RX ORDER — CLINDAMYCIN PHOSPHATE 900 MG/50ML
900 INJECTION INTRAVENOUS EVERY 8 HOURS
Status: COMPLETED | OUTPATIENT
Start: 2022-12-21 | End: 2022-12-22

## 2022-12-21 RX ORDER — IBUPROFEN 600 MG/1
600 TABLET ORAL ONCE AS NEEDED
Status: COMPLETED | OUTPATIENT
Start: 2022-12-21 | End: 2022-12-21

## 2022-12-21 RX ORDER — SODIUM CHLORIDE 0.9 % (FLUSH) 0.9 %
3-10 SYRINGE (ML) INJECTION AS NEEDED
Status: DISCONTINUED | OUTPATIENT
Start: 2022-12-21 | End: 2022-12-21 | Stop reason: HOSPADM

## 2022-12-21 RX ORDER — LIDOCAINE HYDROCHLORIDE 20 MG/ML
INJECTION, SOLUTION EPIDURAL; INFILTRATION; INTRACAUDAL; PERINEURAL AS NEEDED
Status: DISCONTINUED | OUTPATIENT
Start: 2022-12-21 | End: 2022-12-21 | Stop reason: SURG

## 2022-12-21 RX ORDER — NALOXONE HCL 0.4 MG/ML
0.04 VIAL (ML) INJECTION AS NEEDED
Status: DISCONTINUED | OUTPATIENT
Start: 2022-12-21 | End: 2022-12-24 | Stop reason: HOSPADM

## 2022-12-21 RX ORDER — ONDANSETRON 2 MG/ML
4 INJECTION INTRAMUSCULAR; INTRAVENOUS
Status: DISCONTINUED | OUTPATIENT
Start: 2022-12-21 | End: 2022-12-24 | Stop reason: HOSPADM

## 2022-12-21 RX ORDER — SODIUM CHLORIDE, SODIUM LACTATE, POTASSIUM CHLORIDE, CALCIUM CHLORIDE 600; 310; 30; 20 MG/100ML; MG/100ML; MG/100ML; MG/100ML
100 INJECTION, SOLUTION INTRAVENOUS CONTINUOUS
Status: DISCONTINUED | OUTPATIENT
Start: 2022-12-21 | End: 2022-12-22

## 2022-12-21 RX ORDER — LIDOCAINE HYDROCHLORIDE 10 MG/ML
0.5 INJECTION, SOLUTION EPIDURAL; INFILTRATION; INTRACAUDAL; PERINEURAL ONCE AS NEEDED
Status: DISCONTINUED | OUTPATIENT
Start: 2022-12-21 | End: 2022-12-21 | Stop reason: HOSPADM

## 2022-12-21 RX ORDER — SODIUM CHLORIDE 9 MG/ML
INJECTION, SOLUTION INTRAVENOUS AS NEEDED
Status: DISCONTINUED | OUTPATIENT
Start: 2022-12-21 | End: 2022-12-21 | Stop reason: HOSPADM

## 2022-12-21 RX ORDER — SODIUM CHLORIDE 0.9 % (FLUSH) 0.9 %
10 SYRINGE (ML) INJECTION AS NEEDED
Status: DISCONTINUED | OUTPATIENT
Start: 2022-12-21 | End: 2022-12-21 | Stop reason: HOSPADM

## 2022-12-21 RX ORDER — EPHEDRINE SULFATE 50 MG/ML
INJECTION, SOLUTION INTRAVENOUS AS NEEDED
Status: DISCONTINUED | OUTPATIENT
Start: 2022-12-21 | End: 2022-12-21 | Stop reason: SURG

## 2022-12-21 RX ORDER — ONDANSETRON 2 MG/ML
INJECTION INTRAMUSCULAR; INTRAVENOUS AS NEEDED
Status: DISCONTINUED | OUTPATIENT
Start: 2022-12-21 | End: 2022-12-21 | Stop reason: SURG

## 2022-12-21 RX ORDER — MIDAZOLAM HYDROCHLORIDE 1 MG/ML
1 INJECTION INTRAMUSCULAR; INTRAVENOUS
Status: DISCONTINUED | OUTPATIENT
Start: 2022-12-21 | End: 2022-12-21 | Stop reason: HOSPADM

## 2022-12-21 RX ORDER — LABETALOL HYDROCHLORIDE 5 MG/ML
5 INJECTION, SOLUTION INTRAVENOUS
Status: DISCONTINUED | OUTPATIENT
Start: 2022-12-21 | End: 2022-12-24 | Stop reason: HOSPADM

## 2022-12-21 RX ORDER — MAGNESIUM HYDROXIDE 1200 MG/15ML
LIQUID ORAL AS NEEDED
Status: DISCONTINUED | OUTPATIENT
Start: 2022-12-21 | End: 2022-12-21 | Stop reason: HOSPADM

## 2022-12-21 RX ORDER — SODIUM CHLORIDE 9 MG/ML
40 INJECTION, SOLUTION INTRAVENOUS AS NEEDED
Status: DISCONTINUED | OUTPATIENT
Start: 2022-12-21 | End: 2022-12-21 | Stop reason: HOSPADM

## 2022-12-21 RX ORDER — FLUMAZENIL 0.1 MG/ML
0.2 INJECTION INTRAVENOUS AS NEEDED
Status: DISCONTINUED | OUTPATIENT
Start: 2022-12-21 | End: 2022-12-24 | Stop reason: HOSPADM

## 2022-12-21 RX ORDER — FENTANYL CITRATE 50 UG/ML
25 INJECTION, SOLUTION INTRAMUSCULAR; INTRAVENOUS
Status: DISCONTINUED | OUTPATIENT
Start: 2022-12-21 | End: 2022-12-24 | Stop reason: HOSPADM

## 2022-12-21 RX ORDER — KETAMINE HCL IN NACL, ISO-OSM 100MG/10ML
SYRINGE (ML) INJECTION AS NEEDED
Status: DISCONTINUED | OUTPATIENT
Start: 2022-12-21 | End: 2022-12-21 | Stop reason: SURG

## 2022-12-21 RX ORDER — OXYCODONE HCL 20 MG/1
20 TABLET, FILM COATED, EXTENDED RELEASE ORAL ONCE
Status: COMPLETED | OUTPATIENT
Start: 2022-12-21 | End: 2022-12-21

## 2022-12-21 RX ORDER — OXYCODONE AND ACETAMINOPHEN 10; 325 MG/1; MG/1
1 TABLET ORAL ONCE AS NEEDED
Status: DISCONTINUED | OUTPATIENT
Start: 2022-12-21 | End: 2022-12-24 | Stop reason: HOSPADM

## 2022-12-21 RX ORDER — SUCCINYLCHOLINE/SOD CL,ISO/PF 200MG/10ML
SYRINGE (ML) INTRAVENOUS AS NEEDED
Status: DISCONTINUED | OUTPATIENT
Start: 2022-12-21 | End: 2022-12-21 | Stop reason: SURG

## 2022-12-21 RX ORDER — SODIUM CHLORIDE 0.9 % (FLUSH) 0.9 %
3 SYRINGE (ML) INJECTION EVERY 12 HOURS SCHEDULED
Status: DISCONTINUED | OUTPATIENT
Start: 2022-12-21 | End: 2022-12-21 | Stop reason: HOSPADM

## 2022-12-21 RX ADMIN — CLONIDINE HYDROCHLORIDE 0.1 MG: 0.1 TABLET ORAL at 21:56

## 2022-12-21 RX ADMIN — Medication 3 ML: at 21:56

## 2022-12-21 RX ADMIN — OXYCODONE HYDROCHLORIDE 20 MG: 20 TABLET, FILM COATED, EXTENDED RELEASE ORAL at 08:28

## 2022-12-21 RX ADMIN — SODIUM CHLORIDE 100 ML/HR: 9 INJECTION, SOLUTION INTRAVENOUS at 23:20

## 2022-12-21 RX ADMIN — PROPOFOL 150 MG: 10 INJECTION, EMULSION INTRAVENOUS at 08:51

## 2022-12-21 RX ADMIN — ONDANSETRON 4 MG: 2 INJECTION INTRAMUSCULAR; INTRAVENOUS at 10:40

## 2022-12-21 RX ADMIN — IBUPROFEN 600 MG: 600 TABLET, FILM COATED ORAL at 11:24

## 2022-12-21 RX ADMIN — GABAPENTIN 300 MG: 300 CAPSULE ORAL at 16:48

## 2022-12-21 RX ADMIN — HYDROMORPHONE HYDROCHLORIDE 0.5 MG: 1 INJECTION, SOLUTION INTRAMUSCULAR; INTRAVENOUS; SUBCUTANEOUS at 11:39

## 2022-12-21 RX ADMIN — Medication 20 MG: at 09:31

## 2022-12-21 RX ADMIN — FENTANYL CITRATE 100 MCG: 50 INJECTION, SOLUTION INTRAMUSCULAR; INTRAVENOUS at 08:45

## 2022-12-21 RX ADMIN — METFORMIN HYDROCHLORIDE 1000 MG: 500 TABLET ORAL at 17:01

## 2022-12-21 RX ADMIN — Medication 120 MG: at 08:51

## 2022-12-21 RX ADMIN — LOSARTAN POTASSIUM 25 MG: 50 TABLET, FILM COATED ORAL at 12:57

## 2022-12-21 RX ADMIN — HYDROCHLOROTHIAZIDE 12.5 MG: 25 TABLET ORAL at 12:58

## 2022-12-21 RX ADMIN — CLINDAMYCIN IN 5 PERCENT DEXTROSE 900 MG: 18 INJECTION, SOLUTION INTRAVENOUS at 08:52

## 2022-12-21 RX ADMIN — ROCURONIUM BROMIDE 10 MG: 10 SOLUTION INTRAVENOUS at 08:51

## 2022-12-21 RX ADMIN — SODIUM CHLORIDE 100 ML/HR: 9 INJECTION, SOLUTION INTRAVENOUS at 12:49

## 2022-12-21 RX ADMIN — FENTANYL CITRATE 100 MCG: 50 INJECTION, SOLUTION INTRAMUSCULAR; INTRAVENOUS at 09:51

## 2022-12-21 RX ADMIN — DROPERIDOL 0.62 MG: 2.5 INJECTION, SOLUTION INTRAMUSCULAR; INTRAVENOUS at 11:46

## 2022-12-21 RX ADMIN — FENTANYL CITRATE 50 MCG: 50 INJECTION, SOLUTION INTRAMUSCULAR; INTRAVENOUS at 08:48

## 2022-12-21 RX ADMIN — SODIUM CHLORIDE, POTASSIUM CHLORIDE, SODIUM LACTATE AND CALCIUM CHLORIDE 100 ML/HR: 600; 310; 30; 20 INJECTION, SOLUTION INTRAVENOUS at 12:03

## 2022-12-21 RX ADMIN — Medication 30 MG: at 08:57

## 2022-12-21 RX ADMIN — Medication 3 ML: at 12:52

## 2022-12-21 RX ADMIN — CLINDAMYCIN IN 5 PERCENT DEXTROSE 900 MG: 18 INJECTION, SOLUTION INTRAVENOUS at 16:48

## 2022-12-21 RX ADMIN — LIDOCAINE HYDROCHLORIDE 100 MG: 20 INJECTION, SOLUTION EPIDURAL; INFILTRATION; INTRACAUDAL; PERINEURAL at 08:51

## 2022-12-21 RX ADMIN — DOCUSATE SODIUM 50 MG AND SENNOSIDES 8.6 MG 1 TABLET: 8.6; 5 TABLET, FILM COATED ORAL at 21:56

## 2022-12-21 RX ADMIN — OXYCODONE HYDROCHLORIDE AND ACETAMINOPHEN 1 TABLET: 7.5; 325 TABLET ORAL at 21:56

## 2022-12-21 RX ADMIN — HYDROMORPHONE HYDROCHLORIDE 0.5 MG: 1 INJECTION, SOLUTION INTRAMUSCULAR; INTRAVENOUS; SUBCUTANEOUS at 12:02

## 2022-12-21 RX ADMIN — EPHEDRINE SULFATE 25 MG: 50 INJECTION INTRAVENOUS at 10:41

## 2022-12-21 RX ADMIN — SODIUM CHLORIDE, POTASSIUM CHLORIDE, SODIUM LACTATE AND CALCIUM CHLORIDE 1000 ML: 600; 310; 30; 20 INJECTION, SOLUTION INTRAVENOUS at 08:28

## 2022-12-21 RX ADMIN — INSULIN LISPRO 2 UNITS: 100 INJECTION, SOLUTION INTRAVENOUS; SUBCUTANEOUS at 16:52

## 2022-12-21 RX ADMIN — SODIUM CHLORIDE, POTASSIUM CHLORIDE, SODIUM LACTATE AND CALCIUM CHLORIDE 100 ML/HR: 600; 310; 30; 20 INJECTION, SOLUTION INTRAVENOUS at 08:27

## 2022-12-21 RX ADMIN — GABAPENTIN 300 MG: 300 CAPSULE ORAL at 23:18

## 2022-12-21 RX ADMIN — ATORVASTATIN CALCIUM 20 MG: 10 TABLET, FILM COATED ORAL at 12:55

## 2022-12-21 RX ADMIN — FENTANYL CITRATE 50 MCG: 50 INJECTION, SOLUTION INTRAMUSCULAR; INTRAVENOUS at 09:40

## 2022-12-21 RX ADMIN — CLONIDINE HYDROCHLORIDE 0.1 MG: 0.1 TABLET ORAL at 16:48

## 2022-12-21 RX ADMIN — DOCUSATE SODIUM 50 MG AND SENNOSIDES 8.6 MG 1 TABLET: 8.6; 5 TABLET, FILM COATED ORAL at 12:57

## 2022-12-21 RX ADMIN — FENTANYL CITRATE 50 MCG: 50 INJECTION, SOLUTION INTRAMUSCULAR; INTRAVENOUS at 09:22

## 2022-12-21 RX ADMIN — EPHEDRINE SULFATE 25 MG: 50 INJECTION INTRAVENOUS at 08:55

## 2022-12-21 NOTE — PLAN OF CARE
Goal Outcome Evaluation:  Plan of Care Reviewed With: patient        Progress: no change  Outcome Evaluation: A & O, VSS,  denies pain, abdomen dressing dry and intact, up with SBA and LSO brace to BR, voiding, NPO since midnight for part 2 surgery this am

## 2022-12-21 NOTE — PROGRESS NOTES
Manuel Moscoso is a 62 y.o. male patient.    Blood sugars over 300 but addition of sliding scale insulin has worked nicely with a sugar of 133 this morning.    Plan for posterior aspect of surgery today.      Current Facility-Administered Medications   Medication Dose Route Frequency Provider Last Rate Last Admin   • acetaminophen (TYLENOL) tablet 650 mg  650 mg Oral Q4H PRN CHARITO Garcia MD       • atorvastatin (LIPITOR) tablet 20 mg  20 mg Oral Daily CHARITO Garcia MD   20 mg at 12/20/22 2047   • ceFAZolin in 0.9% normal saline (ANCEF) IVPB solution 2 g  2 g Intravenous On Call to OR Michael Wallace PA       • clindamycin (CLEOCIN) 900 mg in dextrose 5% 50 mL IVPB (premix)  900 mg Intravenous Once CHARITO Garcia MD       • cloNIDine (CATAPRES) tablet 0.1 mg  0.1 mg Oral TID CHARITO Garcia MD   0.1 mg at 12/20/22 2047   • cyclobenzaprine (FLEXERIL) tablet 10 mg  10 mg Oral TID PRN CHARITO Garcia MD       • dextrose (D50W) (25 g/50 mL) IV injection 25 g  25 g Intravenous Q15 Min PRN Adolph Chao MD       • dextrose (GLUTOSE) oral gel 15 g  15 g Oral Q15 Min PRN Adolph Chao MD       • gabapentin (NEURONTIN) capsule 300 mg  300 mg Oral TID CHARITO Garcia MD   300 mg at 12/20/22 2047   • glucagon (human recombinant) (GLUCAGEN DIAGNOSTIC) injection 1 mg  1 mg Intramuscular Q15 Min PRN Adolph Chao MD       • hydroCHLOROthiazide (HYDRODIURIL) tablet 12.5 mg  12.5 mg Oral Daily CHARITO Garcia MD   12.5 mg at 12/20/22 1236   • HYDROmorphone (DILAUDID) injection 1 mg  1 mg Intravenous Q3H PRN CHARITO Garcia MD        And   • naloxone (NARCAN) injection 0.4 mg  0.4 mg Intravenous Q5 Min PRN CHARITO Garcia MD       • Insulin Lispro (humaLOG) injection 2-7 Units  2-7 Units Subcutaneous TID AC Adolph Chao MD   2 Units at 12/20/22 1722   • losartan (COZAAR) tablet 25 mg  25 mg Oral Daily CHARITO Garcia MD   25 mg at 12/20/22  0817   • metFORMIN (GLUCOPHAGE) tablet 1,000 mg  1,000 mg Oral BID With Meals CHARITO Garcia MD   1,000 mg at 12/20/22 1722   • ondansetron (ZOFRAN) tablet 4 mg  4 mg Oral Q6H PRN CHARITO Garcia MD        Or   • ondansetron (ZOFRAN) injection 4 mg  4 mg Intravenous Q6H PRN CHARITO Garcia MD       • oxyCODONE ER (oxyCONTIN) 12 hr tablet 20 mg  20 mg Oral Once CHARITO Garcia MD       • oxyCODONE-acetaminophen (PERCOCET) 7.5-325 MG per tablet 1 tablet  1 tablet Oral Q4H PRN CHARITO Garcia MD       • oxyCODONE-acetaminophen (PERCOCET) 7.5-325 MG per tablet 2 tablet  2 tablet Oral Q4H PRN CHARITO Garcia MD       • polyethylene glycol (MIRALAX) packet 17 g  17 g Oral Daily PRN CHARITO Garcia MD       • sennosides-docusate (PERICOLACE) 8.6-50 MG per tablet 1 tablet  1 tablet Oral BID CHARITO Garcia MD   1 tablet at 12/20/22 2048   • sodium chloride 0.9 % flush 10 mL  10 mL Intravenous PRN CHARITO Garcia MD   10 mL at 12/19/22 2013   • sodium chloride 0.9 % flush 3 mL  3 mL Intravenous Q12H CHARITO Garcia MD   3 mL at 12/20/22 2048   • sodium chloride 0.9 % infusion 40 mL  40 mL Intravenous PRN CHARITO Garcia MD       • sodium chloride 0.9 % infusion  100 mL/hr Intravenous Continuous CHARITO Garcia  mL/hr at 12/19/22 1522 100 mL/hr at 12/19/22 1522     ALLERGIES:    Allergies   Allergen Reactions   • Alpha-Gal Nausea And Vomiting   • Penicillins Unknown - Low Severity     STATES HE \"HAS NO IDEA\" AND THAT IT WAS A CHILDHOOD ALLERGY       Lumbago of multiple sites in spine with sciatica    Lumbosacral disc disease    Chronic bilateral low back pain without sciatica    Type 2 diabetes mellitus with hyperglycemia, without long-term current use of insulin (HCC)    Essential hypertension    Drug-induced constipation    Blood pressure 135/64, pulse 75, temperature 98.6 °F (37 °C), temperature source Oral, resp. rate 16, height 175.3 cm (69\"), weight 101 kg (223 lb  9.6 oz), SpO2 95 %.      Subjective:  Symptoms:  Stable.    Diet:  NPO.    Activity level: Impaired due to pain.    Pain:  He complains of pain that is moderate.  He reports pain is unchanged.  Pain is partially controlled.      Review of Systems  Objective:  General Appearance:  Comfortable and well-appearing.    Vital signs: (most recent): Blood pressure 135/64, pulse 75, temperature 98.6 °F (37 °C), temperature source Oral, resp. rate 16, height 175.3 cm (69\"), weight 101 kg (223 lb 9.6 oz), SpO2 95 %.  Vital signs are normal.    Output: Producing urine and minimal stool output.    HEENT: Normal HEENT exam.    Lungs:  Normal effort and normal respiratory rate.    Heart: Normal rate.  Regular rhythm.    Abdomen: Abdomen is soft.  Hypoactive bowel sounds.   There is generalized tenderness.     Extremities: Decreased range of motion.    Pupils:  Pupils are equal, round, and reactive to light.    Skin:  Warm and dry.              Labs:  Lab Results (last 72 hours)     Procedure Component Value Units Date/Time    POC Glucose Once [[id number]]  (Abnormal) Collected: 12/20/22 2005    Specimen: Blood Updated: 12/20/22 2016     Glucose 133 mg/dL      Comment: : [id number] Townsend JonesMeter ID: [id number]       POC Glucose Once [[id number]]  (Abnormal) Collected: 12/20/22 1701    Specimen: Blood Updated: 12/20/22 1713     Glucose 169 mg/dL      Comment: : [id number] Miranda ValopaaJose Danielter ID: [id number]       POC Glucose Once [[id number]]  (Abnormal) Collected: 12/20/22 1208    Specimen: Blood Updated: 12/20/22 1219     Glucose 330 mg/dL      Comment: : [id number] adflyeramberly ID: [id number]       Basic Metabolic Panel [[id number]]  (Abnormal) Collected: 12/20/22 0719    Specimen: Blood Updated: 12/20/22 0757     Glucose 146 mg/dL      BUN 17 mg/dL      Creatinine 0.96 mg/dL      Sodium 141 mmol/L      Potassium 4.1 mmol/L      Chloride 102 mmol/L      CO2 28.0 mmol/L      Calcium 9.2 mg/dL      BUN/Creatinine Ratio  17.7     Anion Gap 11.0 mmol/L      eGFR 89.9 mL/min/1.73      Comment: National Kidney Foundation and American Society of Nephrology (ASN) Task Force recommended calculation based on the Chronic Kidney Disease Epidemiology Collaboration (CKD-EPI) equation refit without adjustment for race.       Narrative:      GFR Normal >60  Chronic Kidney Disease <60  Kidney Failure <15      CBC & Differential [[id number]]  (Abnormal) Collected: 12/20/22 0719    Specimen: Blood Updated: 12/20/22 0734    Narrative:      The following orders were created for panel order CBC & Differential.  Procedure                               Abnormality         Status                     ---------                               -----------         ------                     CBC Auto Differential[[id number]]        Abnormal            Final result                 Please view results for these tests on the individual orders.    CBC Auto Differential [[id number]]  (Abnormal) Collected: 12/20/22 0719    Specimen: Blood Updated: 12/20/22 0734     WBC 11.12 10*3/mm3      RBC 4.43 10*6/mm3      Hemoglobin 13.9 g/dL      Hematocrit 41.1 %      MCV 92.8 fL      MCH 31.4 pg      MCHC 33.8 g/dL      RDW 11.9 %      RDW-SD 40.8 fl      MPV 11.0 fL      Platelets 222 10*3/mm3      Neutrophil % 76.6 %      Lymphocyte % 13.6 %      Monocyte % 7.8 %      Eosinophil % 1.3 %      Basophil % 0.3 %      Immature Grans % 0.4 %      Neutrophils, Absolute 8.53 10*3/mm3      Lymphocytes, Absolute 1.51 10*3/mm3      Monocytes, Absolute 0.87 10*3/mm3      Eosinophils, Absolute 0.14 10*3/mm3      Basophils, Absolute 0.03 10*3/mm3      Immature Grans, Absolute 0.04 10*3/mm3      nRBC 0.0 /100 WBC     POC Glucose Once [[id number]]  (Abnormal) Collected: 12/19/22 1304    Specimen: Blood Updated: 12/19/22 1315     Glucose 139 mg/dL      Comment: : [id number] Prema Orellana ID: [id number]       POC Glucose Once [[id number]]  (Normal) Collected: 12/19/22 0851    Specimen:  Blood Updated: 12/19/22 0902     Glucose 106 mg/dL      Comment: : Gonsalo HanseneMeter ID: [id number]             Imaging Results (Last 72 Hours)     Procedure Component Value Units Date/Time    XR Abdomen KUB [[id number]] Collected: 12/19/22 1316     Updated: 12/19/22 1320    Narrative:      EXAM/TECHNIQUE: XR ABDOMEN KUB-     INDICATION: no count in or     COMPARISON: None available.     FINDINGS:     Postoperative change of L5-S1 anterior fusion without evidence of  complication. No unexpected radiopaque foreign body. LEFT L4-L5 hardware  is also present. Multilevel lumbar spine degenerative change.  Nonobstructive bowel gas pattern.       Impression:         No unexpected radiopaque foreign body.  This report was finalized on 12/19/2022 13:17 by Dr. Demetris Lomas MD.    XR Spine Lumbar AP & Lateral [[id number]] Collected: 12/19/22 1316     Updated: 12/19/22 1319    Narrative:      XR SPINE LUMBAR AP AND LATERAL- 12/19/2022 1:00 PM CST     HISTORY: alif     COMPARISON: None     FLUOROSCOPY TIME: 11.2 seconds     FLUOROSCOPY DOSE: 10.0 mGy     NUMBER OF IMAGES: 7       Impression:         Intraoperative fluoroscopic images during lumbar fusion.     Please refer to the operative note for more details.   This report was finalized on 12/19/2022 13:16 by Dr. Demetris Lomas MD.    FL C Arm During Surgery [[id number]] Resulted: 12/19/22 1305     Updated: 12/19/22 1305    Narrative:      This procedure was auto-finalized with no dictation required.                Assessment:    Condition: In stable condition.  Improving.       Plan:   Transfer Plan: To operating room today for posterior aspect of surgery.  Encourage ambulation and per physical therapy.  (    Type 2 diabetes-blood sugars were over 300 initially, addition of sliding scale insulin they are 134 this morning.  We will follow-up after posterior aspect of surgery today.    Constipation/obstipation-continue with stool softeners and can add cathartic  after posterior aspect of surgery today.    Pain is remarkably well controlled.    Patient medically stable for surgical intervention we will follow after surgery with expectations of discharge home 1-2 days    ).     Problem List:     Lumbago of multiple sites in spine with sciatica    Lumbosacral disc disease    Chronic bilateral low back pain without sciatica    Type 2 diabetes mellitus with hyperglycemia, without long-term current use of insulin (HCC)    Essential hypertension    Drug-induced constipation    Adolph Chao MD  12/21/2022

## 2022-12-21 NOTE — THERAPY RE-EVALUATION
Patient Name: Manuel Moscoso  : 1960    MRN: [id number]                              Today's Date: 2022       Admit Date: 2022    Visit Dx:     ICD-10-CM ICD-9-CM   1. Chronic bilateral low back pain without sciatica  M54.50 724.2    G89.29 338.29   2. Impaired mobility  Z74.09 799.89     Patient Active Problem List   Diagnosis   • Lumbosacral disc disease   • Chronic bilateral low back pain without sciatica   • Lumbago of multiple sites in spine with sciatica   • Type 2 diabetes mellitus with hyperglycemia, without long-term current use of insulin (HCC)   • Essential hypertension   • Drug-induced constipation     Past Medical History:   Diagnosis Date   • Allergy to alpha-gal    • Arthritis    • Chronic bilateral low back pain without sciatica 2022   • Diabetes mellitus (HCC)    • Hearing loss, left    • History of staph infection    • Hypertension    • Low back pain    • Lumbosacral disc disease 2022   • Psoriasis    • Sleep apnea    • Tinnitus    • Vision disturbance     using rx eyedrops     Past Surgical History:   Procedure Laterality Date   • BACK SURGERY      x 2   • CHOLECYSTECTOMY     • COLONOSCOPY     • GANGLION CYST EXCISION Left     wrist   • LIPOMA EXCISION      back      General Information     Row Name 22 1451          Physical Therapy Time and Intention    Document Type re-evaluation  s/p L LLIF L4-5, s/p ALIF L5-S1 22, back, L buttock, thigh, and leg radicular pain, neurogenic claudication, DM periphal neuropathy  -MS     Mode of Treatment physical therapy;individual therapy  -MS     Row Name 22 6348          General Information    Patient Profile Reviewed yes  -MS     Prior Level of Function independent:;all household mobility;ADL's  -MS     Existing Precautions/Restrictions fall;brace worn when out of bed;LSO;spinal  -MS     Barriers to Rehab physical barrier  -MS     Row Name 22 1454          Living Environment    People in Home spouse   -MS     Row Name 12/21/22 1450          Home Main Entrance    Number of Stairs, Main Entrance four  -MS     Stair Railings, Main Entrance railings on both sides of stairs  -MS     Row Name 12/21/22 1450          Stairs Within Home, Primary    Number of Stairs, Within Home, Primary none  -MS     Row Name 12/21/22 1450          Cognition    Orientation Status (Cognition) oriented x 4  -MS     Row Name 12/21/22 1450          Safety Issues, Functional Mobility    Impairments Affecting Function (Mobility) balance;strength;pain  -MS           User Key  (r) = Recorded By, (t) = Taken By, (c) = Cosigned By    Initials Name Provider Type    Chantelle Lawrence LAURA, PT, DPT, NCS Physical Therapist               Mobility     Row Name 12/21/22 1450          Bed Mobility    Comment, (Bed Mobility) pt sitting EOB  -MS     Row Name 12/21/22 1450          Sit-Stand Transfer    Sit-Stand [place] (Transfers) contact guard  -MS     Assistive Device (Sit-Stand Transfers) walker, front-wheeled  -MS     Row Name 12/21/22 1450          Gait/Stairs (Locomotion)    [place] Level (Gait) contact guard;minimum assist (75% patient effort)  -MS     Assistive Device (Gait) walker, front-wheeled  -MS     Distance in Feet (Gait) 200ft with buckling of L knee x2 with min A during buckling, pt clears L foot with mid foot strike  -MS           User Key  (r) = Recorded By, (t) = Taken By, (c) = Cosigned By    Initials Name Provider Type    MS Willson Chantelle LAURA, PT, DPT, NCS Physical Therapist               Obj/Interventions     Row Name 12/21/22 1450          Range of Motion Comprehensive    Comment, General Range of Motion R LE WNL, L hip flexion impaired 75% in standing, no AROM in sitting  -MS     Row Name 12/21/22 1450          Strength Comprehensive (MMT)    Comment, General Manual Muscle Testing (MMT) Assessment R LE 5/5, L EHL 3/5, dorsiflexion 3/5, quad 3/5, hamstring 5/5, hip flexion 2+/5  -MS     Row Name 12/21/22 1450          Balance     Balance Assessment sitting static balance;sitting dynamic balance;standing static balance;standing dynamic balance  -MS     Static Sitting Balance independent  -MS     Dynamic Sitting Balance independent  -MS     Position, Sitting Balance unsupported;sitting edge of bed  -MS     Static Standing Balance contact guard  -MS     Dynamic Standing Balance contact guard;minimal assist  -MS     Position/Device Used, Standing Balance supported  -MS     Row Name 12/21/22 1450          Sensory Assessment (Somatosensory)    Sensory Assessment (Somatosensory) --  chronic neuropathy in L foot per pt. no other sensation deficits  -MS           User Key  (r) = Recorded By, (t) = Taken By, (c) = Cosigned By    Initials Name Provider Type    Chantelle Lawrence R, PT, DPT, NCS Physical Therapist               Goals/Plan     Row Name 12/21/22 1450          Bed Mobility Goal 1 (PT)    Activity/Assistive Device (Bed Mobility Goal 1, PT) bed mobility activities, all  -MS     [place] Level/Cues Needed (Bed Mobility Goal 1, PT) independent  -MS     Time Frame (Bed Mobility Goal 1, PT) long term goal (LTG);by discharge  -MS     Progress/Outcomes (Bed Mobility Goal 1, PT) new goal  -MS     Row Name 12/21/22 1450          Transfer Goal 1 (PT)    Activity/Assistive Device (Transfer Goal 1, PT) sit-to-stand/stand-to-sit;bed-to-chair/chair-to-bed;walker, rolling  -MS     [place] Level/Cues Needed (Transfer Goal 1, PT) independent  -MS     Time Frame (Transfer Goal 1, PT) long term goal (LTG);10 days  -MS     Progress/Outcome (Transfer Goal 1, PT) goal revised this date  -MS     Row Name 12/21/22 1450          Gait Training Goal 1 (PT)    Activity/Assistive Device (Gait Training Goal 1, PT) gait (walking locomotion);decrease fall risk;improve balance and speed;increase endurance/gait distance;walker, rolling  -MS     [place] Level (Gait Training Goal 1, PT) modified independence  -MS     Distance (Gait Training Goal 1, PT) 200ft  with L heel strike  -MS     Time Frame (Gait Training Goal 1, PT) long term goal (LTG);10 days  -MS     Progress/Outcome (Gait Training Goal 1, PT) goal revised this date  -MS     Row Name 12/21/22 4680          Stairs Goal 1 (PT)    Activity/Assistive Device (Stairs Goal 1, PT) ascending stairs;descending stairs  -MS     [place] Level/Cues Needed (Stairs Goal 1, PT) supervision required  -MS     Number of Stairs (Stairs Goal 1, PT) 5 steps  -MS     Time Frame (Stairs Goal 1, PT) long term goal (LTG);10 days  -MS     Progress/Outcome (Stairs Goal 1, PT) progress slower than expected;goal ongoing  -MS     Row Name 12/21/22 1750          Therapy Assessment/Plan (PT)    Planned Therapy Interventions (PT) balance training;bed mobility training;gait training;patient/family education;orthotic fitting/training;neuromuscular re-education;strengthening;stair training;transfer training;ROM (range of motion)  -MS           User Key  (r) = Recorded By, (t) = Taken By, (c) = Cosigned By    Initials Name Provider Type    Chantelle Lawrence R, PT, DPT, NCS Physical Therapist               Clinical Impression     Row Name 12/21/22 1451          Pain    Pretreatment Pain Rating 5/10  -MS     Posttreatment Pain Rating 5/10  -MS     Pain Location - Side/Orientation Left  -MS     Pain Location incisional  -MS     Pain Intervention(s) Medication (See MAR);Repositioned;Ambulation/increased activity  -MS     Row Name 12/21/22 7897          Plan of Care Review    Plan of Care Reviewed With patient  -MS     Progress declining  -MS     Outcome Evaluation The patient presents alert and oriented x4 sitting EOB. He demonstrates new weakness of his L LE. His L hip flexion is a 2+/5, quad/dorsiflexion, and EHL are 3/5. Nursing notified of the weakness and Joesph MAGUIRE notified. His L knee qasim during gait and now requires use of a RW. He is able to clear his L LE during gait with a midfoot strike. He will benefit from continued PT to work on  strengthening, balance, and gait mechanics. The patient has another surgery before discharge.  -MS     Row Name 12/21/22 1450          Therapy Assessment/Plan (PT)    Patient/Family Therapy Goals Statement (PT) strengthen L LE  -MS     Rehab Potential (PT) good, to achieve stated therapy goals  -MS     Criteria for Skilled Interventions Met (PT) yes;meets criteria;skilled treatment is necessary  -MS     Therapy Frequency (PT) 2 times/day  -MS     Predicted Duration of Therapy Intervention (PT) until discharge  -MS     Row Name 12/21/22 1450          Positioning and Restraints    Post Treatment Position chair  -MS     In Chair notified nsg;sitting;call light within reach;encouraged to call for assist;with brace  -MS           User Key  (r) = Recorded By, (t) = Taken By, (c) = Cosigned By    Initials Name Provider Type    Chantelle Lawrence, PT, DPT, NCS Physical Therapist               Outcome Measures     Row Name 12/21/22 1450          How much help from another person do you currently need...    Turning from your back to your side while in flat bed without using bedrails? 3  -MS     Moving from lying on back to sitting on the side of a flat bed without bedrails? 3  -MS     Moving to and from a bed to a chair (including a wheelchair)? 3  -MS     Standing up from a chair using your arms (e.g., wheelchair, bedside chair)? 3  -MS     Climbing 3-5 steps with a railing? 2  -MS     To walk in hospital room? 3  -MS     AM-PAC 6 Clicks Score (PT) 17  -MS     Highest level of mobility 5 --> Static standing  -MS     Row Name 12/21/22 1450          Functional Assessment    Outcome Measure Options AM-PAC 6 Clicks Basic Mobility (PT)  -MS           User Key  (r) = Recorded By, (t) = Taken By, (c) = Cosigned By    Initials Name Provider Type    Chantelle Lawrence, PT, DPT, NCS Physical Therapist                             Physical Therapy Education     Title: PT OT SLP Therapies (In Progress)     Topic: Physical Therapy (In  Progress)     Point: Mobility training (Done)     Learning Progress Summary           Patient Acceptance, E, VU by MS at 12/21/2022 1557    Comment: role of PT in his care, spinal restrictions    Acceptance, E, NR by DOUG at 12/19/2022 1449    Comment: Benefits of activity, progression of PT, back precautions, LSO use/radha/doff                   Point: Home exercise program (Not Started)     Learner Progress:  Not documented in this visit.          Point: Body mechanics (Not Started)     Learner Progress:  Not documented in this visit.          Point: Precautions (Done)     Learning Progress Summary           Patient Acceptance, E, VU by MS at 12/21/2022 1557    Comment: role of PT in his care, spinal restrictions    Acceptance, E, NR by DOUG at 12/19/2022 1449    Comment: Benefits of activity, progression of PT, back precautions, LSO use/radha/doff                               User Key     Initials Effective Dates Name Provider Type Discipline    DOUG 08/02/16 -  Rakesh Cheung, PT DPT Physical Therapist PT    MS 06/19/18 -  Chantelle Willson, PT, DPT, NCS Physical Therapist PT              PT Recommendation and Plan  Planned Therapy Interventions (PT): balance training, bed mobility training, gait training, patient/family education, orthotic fitting/training, neuromuscular re-education, strengthening, stair training, transfer training, ROM (range of motion)  Plan of Care Reviewed With: patient  Progress: declining  Outcome Evaluation: The patient presents alert and oriented x4 sitting EOB. He demonstrates new weakness of his L LE. His L hip flexion is a 2+/5, quad/dorsiflexion, and EHL are 3/5. Nursing notified of the weakness and Joesph MAGUIRE notified. His L knee qasim during gait and now requires use of a RW. He is able to clear his L LE during gait with a midfoot strike. He will benefit from continued PT to work on strengthening, balance, and gait mechanics. The patient has another surgery before discharge.     Time  Calculation:    PT Charges     Row Name 12/21/22 1450             Time Calculation    Start Time 1450  -MS      Stop Time 1530  -MS      Time Calculation (min) 40 min  -MS      PT Received On 12/21/22  -MS      PT Goal Re-Cert Due Date 12/31/22  -MS         Time Calculation- PT    Total Timed Code Minutes- PT 10 minute(s)  -MS         Timed Charges    44355 - Gait Training Minutes  10  -MS         Untimed Charges    PT Eval/Re-eval Minutes 30  -MS         Total Minutes    Timed Charges Total Minutes 10  -MS      Untimed Charges Total Minutes 30  -MS       Total Minutes 40  -MS            User Key  (r) = Recorded By, (t) = Taken By, (c) = Cosigned By    Initials Name Provider Type    MS Chantelle Willson, PT, DPT, NCS Physical Therapist              Therapy Charges for Today     Code Description Service Date Service Provider Modifiers Qty    69561579058 HC GAIT TRAINING EA 15 MIN 12/21/2022 Chantelle Willson, PT, DPT, NCS GP 1    20329851688 HC PT RE-EVAL ESTABLISHED PLAN 2 12/21/2022 Chantelle Willson, PT, DPT, NCS GP 1          PT G-Codes  Outcome Measure Options: AM-PAC 6 Clicks Basic Mobility (PT)  AM-PAC 6 Clicks Score (PT): 17  AM-PAC 6 Clicks Score (OT): 22  PT Discharge Summary  Anticipated Discharge Disposition (PT): home with assist, inpatient rehabilitation facility    Chantelle Willson, PT, DPT, NCS  12/21/2022

## 2022-12-21 NOTE — PLAN OF CARE
Goal Outcome Evaluation:  Plan of Care Reviewed With: patient        Progress: declining  Outcome Evaluation: The patient presents alert and oriented x4 sitting EOB. He demonstrates new weakness of his L LE. His L hip flexion is a 2+/5, quad/dorsiflexion, and EHL are 3/5. Nursing notified of the weakness and Joesph MAGUIRE notified. His L knee qasim during gait and now requires use of a RW. He is able to clear his L LE during gait with a midfoot strike. He will benefit from continued PT to work on strengthening, balance, and gait mechanics. The patient has another surgery before discharge.

## 2022-12-21 NOTE — ANESTHESIA PROCEDURE NOTES
Airway  Urgency: elective    Date/Time: 12/21/2022 8:53 AM  Airway not difficult    General Information and Staff    Patient location during procedure: OR  CRNA/CAA: Igor Gómez CRNA    Indications and Patient Condition  Indications for airway management: airway protection    Preoxygenated: yes  Mask difficulty assessment: 1 - vent by mask    Final Airway Details  Final airway type: endotracheal airway      Successful airway: ETT  Cuffed: yes   Successful intubation technique: direct laryngoscopy  Blade: Dawkins  Blade size: 2  ETT size (mm): 7.5  Cormack-Lehane Classification: grade I - full view of glottis  Placement verified by: chest auscultation and capnometry   Measured from: lips  ETT/EBT  to lips (cm): 22  Number of attempts at approach: 1  Assessment: lips, teeth, and gum same as pre-op and atraumatic intubation

## 2022-12-21 NOTE — PLAN OF CARE
Goal Outcome Evaluation:              Outcome Evaluation: OOB independent with LSO brace. Denies pain. Good appetite. Glucose monitoring. NPO at midnight for surgery tomorrow. All needs met at this time. Safety maintained. Plan of care continued.

## 2022-12-21 NOTE — OP NOTE
Lateral lumbar interbody fusion procedure Note    Manuel Moscoso  12/21/2022    Pre-op Diagnosis:     1. Status post left L4-5 microdiskectomy, 11/18/2019.  2. Status post revision left L4-5 microdiskectomy, insertion annular closure device, 11/01/2021.  3. Increasing chronic low back pain.  4. Residual left buttock, thigh and leg radiculopathy.  5. Neurogenic claudication.  6. Multilevel lumbar degenerative disc disease, L2 to S1, severe L4 to S1.  7. Multilevel lumbar facet arthropathy, severe L4 to S1.  8. Degenerative scoliosis concave left L4-5.  9. Severe central and bilateral foraminal stenosis, L4 to S1, worse right L4-5, left L5-S1.  10. History of diabetic peripheral neuropathy.  11. History of alpha-gal syndrome after tick bite.  12.  Status post anterior decompression, ALIF with instrumentation L5-S1, 12/19/2022    Post-op Diagnosis:    same    Procedure/CPT® Codes:    1.  Left lateral lumbar interbody fusion L4-5  2.  Anterior spinal instrumentation L4-5 ([employer] lateral plate and screws)  3.  Use of PEEK interbody biomechanical device for fusion L4-5 ([employer] porous PEEK spacer)  4.  Use of allograft bone matrix for fusion ([employer])  5.  Use of fluoroscopy for confirmation of surgical level, placement of PEEK spacer and instrumentation  6.  Intraoperative neural monitoring    Anesthesia: General    Surgeon: CASSIE Garcia MD    Assistant: Joesph Wallace PA-C    Estimated Blood Loss: 50 mL    Complications: None    Condition: Stable to PACU.    Indications:    The patient is a 62-year-old without a primary care physician.  He underwent a left L4-5 microdiscectomy on 11/18/2019 and a revision left L4-5 microdiscectomy with the insertion of an annular closure device on 11/1/2021.  Unfortunately continued to complain of increasing chronic low back pain along with residual left buttock, thigh, and leg radiculopathy as well as symptoms consistent with neurogenic claudication.  Repeat  imaging studies revealed multilevel lumbar degenerative disc disease and facet arthropathy that was severe from L4-S1, where there was a degenerative scoliosis concave to the left at L4-5.  The degenerative changes created severe central and bilateral foraminal stenosis at both levels from L4-S1 that was worse on the right at L4-5 and on the left at L5-S1.    After failing conservative measures, it was mutually decided that surgery would be the best option. Risks, benefits, and complications of surgery were discussed with the patient. The patient appeared well informed and wished to proceed. We specifically discussed the risk of infection, blood loss, nerve root injury, CSF leak, and the possibility of incomplete resolution of symptoms. We also discussed the possible risk of a nonunion and the potential need for additional surgery in the event of a pseudoarthrosis or hardware failure.     We elected proceed with a staged operation.  The first stage of the procedure was performed on 12/19/2022 and involved an anterior decompression and ALIF with instrumentation of L5-S1.  This level required a separate stage as it is not accessible through a lateral approach.  Today we are performing a lateral fusion of the L4-5 level.  It is planned we will be proceeding with a final posterior procedure at a later date involving a posterior spinal fusion with instrumentation spanning L4-S1.    Operative Procedure:    After obtaining informed consent and verifying the correct operative site, the patient was brought to the operating room and placed supine on operating table.  A general anesthetic was provided by the anesthesia service with the assistance of an endotracheal tube.  Once this was appropriately positioned and secured, the patient was carefully rotated into the lateral decubitus position with the left side up. All bony prominences were well-padded.  3 inch wide cloth tape was used to maintain the patient's position.  It  was also used to tip open the pelvis slightly allowing better access to the lumbar spine through a lateral approach.  Fluoroscopy was then used to identify the L4-5 level, and the skin was marked for our planned incision.  The left flank was then prepped and draped in the usual sterile fashion.  A surgical timeout was taken to confirm this was the correct patient, we were working at the correct level, and that preoperative antibiotics were given in a timely fashion.    An oblique incision was created of the left flank using a 10 blade scalpel directly centered over the L4-5 segment. Dissection was carried bluntly through subcutaneous tissues. Blunt dissection was also carried between the external oblique and internal oblique musculature. The transversalis fascia was pierced allowing access to the retroperitoneal space. At this point, a series of neuromonitoring probes were used to safely access the L4-5 level. We used stimulated and free run EMG for this purpose. Once nerve roots were confirmed to be out of harm's way, self retaining retractors were placed for continuous exposure.     An annulotomy was then created at the L4-5 area using a 15 blade scalpel on a long handle. A Fisher elevator was used to remove disc material off of the endplates. Disc material was removed using Kerrisons, pituitaries, and forward angled curettes. Once all disc material had been retrieved, I used the Fisher elevator to divide the contralateral annulus. This assisted with mobilization of the vertebral body. We then used a series of endplate scrapers to prepare the endplates for interbody fusion. Trial spacers were malleted into position and for the disc space it was felt that a 12 mm x 55 mm implant would be the best fit to restore disc height. The disc space was then thoroughly irrigated with saline solution.     A porous PEEK spacer from the [employer] instrumentation set measuring 12 mm x 55 mm x 22 mm was then packed with allograft bone  matrix as tightly as possible. This PEEK spacer was then malleted into the L4-5 disc space under fluoroscopic guidance. It was placed as a PEEK interbody biomechanical device to assist with interbody fusion. Once this spacer was confirmed to be properly positioned, I chose a 2-hole plate to help augment the fusion of L4-5. This plate was held into position using screws. I placed a 5.5 mm x 35 mm screw into both L4 and L5 as fixation.    The wound was then irrigated thoroughly. A thorough inspection was then undertaken to ensure that we had adequate hemostasis. Bleeding at this point was controlled using thrombin with Gelfoam powder and bipolar cautery. Closure was then accomplished with a #1 Vicryl reapproximating the internal and external oblique musculature. Immediate subcutaneous cutaneous tissues were closed with a 3-0 Vicryl. And skin closure was accomplished using Mastisol and Steri-Strips. The wound was then sterilely dressed. The patient was then carefully rotated supine onto a hospital gurney, extubated, and sent to the recovery room in good stable condition.     The patient tolerated the procedure well. There were no complications. We estimate blood loss to be approximately 50 mL. The patient remained hemodynamically stable.     Intraoperative neuro monitoring was ordered and carried out throughout the procedure to add an increased level of safety for the patient.  The interpreting physician was available by means of real-time continuous, bidirectional, remote audio and visual communication as needed throughout the entire procedure.  Modalities used during the procedure included SSEP, EEG, EMG, and TOF.  There were no neuro monitoring signal changes during the procedure.    Joesph Wallace PA-C provided critical assistance during the procedure. His assistance was medically necessary in order to allow the procedure to occur in the most safe and efficient manner.    CASSIE Garcia MD     Date: 12/21/2022   Time: 10:48 CST

## 2022-12-21 NOTE — PLAN OF CARE
Goal Outcome Evaluation:  Plan of Care Reviewed With: patient           Outcome Evaluation: Pt A&Ox4. , room air. Denies n/t. Denies pain at this time, mild discomfort. RINA NATH  weakness noted when working with PT - TING Kumar notified. PPP. LSO when out of bed. L flank and abd drsgs, CDI. BG monitored. IVF. SCD when in bed, pt got up to chair today. DTV. Call light within reach. Safety maintained.     voiding.

## 2022-12-21 NOTE — ANESTHESIA POSTPROCEDURE EVALUATION
"Patient: Manuel Moscoso    Procedure Summary     Date: 12/21/22 Room / Location:  [facility] OR  /  [facility] OR    Anesthesia Start: 0842 Anesthesia Stop: 1102    Procedure: LEFT LATERAL LUMBAR INTERBODY FUSION WITH INSTRUMENTATION L4-5 (Left: Spine Lumbar) Diagnosis: (M54.16)    Surgeons: CHARITO Garcia MD Provider: Igor Gómez CRNA    Anesthesia Type: general ASA Status: 2          Anesthesia Type: general    Vitals  Vitals Value Taken Time   /76 12/21/22 1235   Temp 99 °F (37.2 °C) 12/21/22 1234   Pulse 85 12/21/22 1236   Resp 16 12/21/22 1234   SpO2 96 % 12/21/22 1236   Vitals shown include unvalidated device data.        Post Anesthesia Care and Evaluation    Patient location during evaluation: PACU  Patient participation: complete - patient participated  Level of consciousness: awake and alert  Pain management: adequate    Airway patency: patent  Anesthetic complications: No anesthetic complications  PONV Status: none  Cardiovascular status: acceptable and hemodynamically stable  Respiratory status: acceptable  Hydration status: acceptable    Comments: Blood pressure 160/74, pulse 83, temperature 97.9 °F (36.6 °C), temperature source Oral, resp. rate 18, height 175.3 cm (69\"), weight 101 kg (223 lb 9.6 oz), SpO2 94 %.    Patient discharged from PACU based upon Ismael score. Please see RN notes for further details      "

## 2022-12-21 NOTE — PAYOR COMM NOTE
AUTH: [id number]    Maegan Foster (62 y.o. Male)     Date of Birth   1960    Social Security Number       Address   [facility] [address] [place] [address]    Home Phone   [phone]    MRN   [id number]       Faith   Other    Marital Status                               Admission Date   12/19/22    Admission Type   Elective    Admitting Provider   CHARITO Garcia MD    Attending Provider   CHARITO Garcia MD    Department, Room/Bed   [facility] OR, PAD OR/MAIN OR       Discharge Date       Discharge Disposition       Discharge Destination                               Attending Provider: CHARITO Garcia MD    Allergies: Alpha-gal, Penicillins    Isolation: None   Infection: None   Code Status: CPR    Ht: 175.3 cm (69\")   Wt: 101 kg (223 lb 9.6 oz)    Admission Cmt: None   Principal Problem: Lumbago of multiple sites in spine with sciatica [M54.40]                 Active Insurance as of 12/19/2022     Primary Coverage     Payor Plan Insurance Group Employer/Plan Group    [employer] BLUE CROSS [facility] BLUE CROSS BLUE SHIELD PPO [id number]     Payor Plan Address Payor Plan Phone Number Payor Plan Fax Number Effective Dates    [address] [phone]  3/1/2021 - None Entered    [place] [address]       Subscriber Name Subscriber Birth Date Member ID       MAEGAN FOSTER 1960 [id number]                 Emergency Contacts      (Rel.) Home Phone Work Phone Mobile Phone    HECTOR FOSTER (Friend) -- -- [phone]               Physician Progress Notes (last 48 hours)      Adolph Chao MD at 12/21/22 0725          Maegan Foster is a 62 y.o. male patient.    Blood sugars over 300 but addition of sliding scale insulin has worked nicely with a sugar of 133 this morning.    Plan for posterior aspect of surgery today.      Current Facility-Administered Medications   Medication Dose Route Frequency Provider Last Rate Last Admin   •  acetaminophen (TYLENOL) tablet 650 mg  650 mg Oral Q4H PRN CHARITO Garcia MD       • atorvastatin (LIPITOR) tablet 20 mg  20 mg Oral Daily CHARITO Garcia MD   20 mg at 12/20/22 2047   • ceFAZolin in 0.9% normal saline (ANCEF) IVPB solution 2 g  2 g Intravenous On Call to OR Michael Wallace PA       • clindamycin (CLEOCIN) 900 mg in dextrose 5% 50 mL IVPB (premix)  900 mg Intravenous Once CHARITO Garcia MD       • cloNIDine (CATAPRES) tablet 0.1 mg  0.1 mg Oral TID CHARITO Garcia MD   0.1 mg at 12/20/22 2047   • cyclobenzaprine (FLEXERIL) tablet 10 mg  10 mg Oral TID PRN CHARITO Garcia MD       • dextrose (D50W) (25 g/50 mL) IV injection 25 g  25 g Intravenous Q15 Min PRN Adolph Chao MD       • dextrose (GLUTOSE) oral gel 15 g  15 g Oral Q15 Min PRN Adolph Chao MD       • gabapentin (NEURONTIN) capsule 300 mg  300 mg Oral TID CHARITO Garcia MD   300 mg at 12/20/22 2047   • glucagon (human recombinant) (GLUCAGEN DIAGNOSTIC) injection 1 mg  1 mg Intramuscular Q15 Min PRN Adolph Chao MD       • hydroCHLOROthiazide (HYDRODIURIL) tablet 12.5 mg  12.5 mg Oral Daily CHARITO Garcia MD   12.5 mg at 12/20/22 1236   • HYDROmorphone (DILAUDID) injection 1 mg  1 mg Intravenous Q3H PRN CHARITO Garcia MD        And   • naloxone (NARCAN) injection 0.4 mg  0.4 mg Intravenous Q5 Min PRN CHARITO Garcia MD       • Insulin Lispro (humaLOG) injection 2-7 Units  2-7 Units Subcutaneous TID AC Adolph Chao MD   2 Units at 12/20/22 1722   • losartan (COZAAR) tablet 25 mg  25 mg Oral Daily CHARITO Garcia MD   25 mg at 12/20/22 0817   • metFORMIN (GLUCOPHAGE) tablet 1,000 mg  1,000 mg Oral BID With Meals CHARITO Garcia MD   1,000 mg at 12/20/22 1722   • ondansetron (ZOFRAN) tablet 4 mg  4 mg Oral Q6H PRN CHARITO Garcia MD        Or   • ondansetron (ZOFRAN) injection 4 mg  4 mg Intravenous Q6H PRN CHARITO Garcia MD       • oxyCODONE  ER (oxyCONTIN) 12 hr tablet 20 mg  20 mg Oral Once CHARITO Garcia MD       • oxyCODONE-acetaminophen (PERCOCET) 7.5-325 MG per tablet 1 tablet  1 tablet Oral Q4H PRN CHARITO Garcia MD       • oxyCODONE-acetaminophen (PERCOCET) 7.5-325 MG per tablet 2 tablet  2 tablet Oral Q4H PRN CHARITO Garcia MD       • polyethylene glycol (MIRALAX) packet 17 g  17 g Oral Daily PRN CHARITO Garcia MD       • sennosides-docusate (PERICOLACE) 8.6-50 MG per tablet 1 tablet  1 tablet Oral BID CHARITO Garcia MD   1 tablet at 12/20/22 2048   • sodium chloride 0.9 % flush 10 mL  10 mL Intravenous PRN CHARITO Garcia MD   10 mL at 12/19/22 2013   • sodium chloride 0.9 % flush 3 mL  3 mL Intravenous Q12H CHARITO Garcia MD   3 mL at 12/20/22 2048   • sodium chloride 0.9 % infusion 40 mL  40 mL Intravenous PRN CHARITO Garcia MD       • sodium chloride 0.9 % infusion  100 mL/hr Intravenous Continuous CHARITO Garcia  mL/hr at 12/19/22 1522 100 mL/hr at 12/19/22 1522     ALLERGIES:    Allergies   Allergen Reactions   • Alpha-Gal Nausea And Vomiting   • Penicillins Unknown - Low Severity     STATES HE \"HAS NO IDEA\" AND THAT IT WAS A CHILDHOOD ALLERGY       Lumbago of multiple sites in spine with sciatica    Lumbosacral disc disease    Chronic bilateral low back pain without sciatica    Type 2 diabetes mellitus with hyperglycemia, without long-term current use of insulin ([facility])    Essential hypertension    Drug-induced constipation    Blood pressure 135/64, pulse 75, temperature 98.6 °F (37 °C), temperature source Oral, resp. rate 16, height 175.3 cm (69\"), weight 101 kg (223 lb 9.6 oz), SpO2 95 %.      Subjective:  Symptoms:  Stable.    Diet:  NPO.    Activity level: Impaired due to pain.    Pain:  He complains of pain that is moderate.  He reports pain is unchanged.  Pain is partially controlled.      Review of Systems  Objective:  General Appearance:  Comfortable and well-appearing.    Vital  signs: (most recent): Blood pressure 135/64, pulse 75, temperature 98.6 °F (37 °C), temperature source Oral, resp. rate 16, height 175.3 cm (69\"), weight 101 kg (223 lb 9.6 oz), SpO2 95 %.  Vital signs are normal.    Output: Producing urine and minimal stool output.    HEENT: Normal HEENT exam.    Lungs:  Normal effort and normal respiratory rate.    Heart: Normal rate.  Regular rhythm.    Abdomen: Abdomen is soft.  Hypoactive bowel sounds.   There is generalized tenderness.     Extremities: Decreased range of motion.    Pupils:  Pupils are equal, round, and reactive to light.    Skin:  Warm and dry.              Labs:  Lab Results (last 72 hours)     Procedure Component Value Units Date/Time    POC Glucose Once [[id number]]  (Abnormal) Collected: 12/20/22 2005    Specimen: Blood Updated: 12/20/22 2016     Glucose 133 mg/dL      Comment: : [id number] Noonan JonesMeter ID: [id number]       POC Glucose Once [[id number]]  (Abnormal) Collected: 12/20/22 1701    Specimen: Blood Updated: 12/20/22 1713     Glucose 169 mg/dL      Comment: : [id number] [employer] ID: [id number]       POC Glucose Once [[id number]]  (Abnormal) Collected: 12/20/22 1208    Specimen: Blood Updated: 12/20/22 1219     Glucose 330 mg/dL      Comment: : [id number] Your Image by Brooke ID: [id number]       Basic Metabolic Panel [[id number]]  (Abnormal) Collected: 12/20/22 0719    Specimen: Blood Updated: 12/20/22 0757     Glucose 146 mg/dL      BUN 17 mg/dL      Creatinine 0.96 mg/dL      Sodium 141 mmol/L      Potassium 4.1 mmol/L      Chloride 102 mmol/L      CO2 28.0 mmol/L      Calcium 9.2 mg/dL      BUN/Creatinine Ratio 17.7     Anion Gap 11.0 mmol/L      eGFR 89.9 mL/min/1.73      Comment: National Kidney Foundation and American Society of Nephrology (ASN) Task Force recommended calculation based on the Chronic Kidney Disease Epidemiology Collaboration (CKD-EPI) equation refit without adjustment for race.       Narrative:      GFR  Normal >60  Chronic Kidney Disease <60  Kidney Failure <15      CBC & Differential [[id number]]  (Abnormal) Collected: 12/20/22 0719    Specimen: Blood Updated: 12/20/22 0734    Narrative:      The following orders were created for panel order CBC & Differential.  Procedure                               Abnormality         Status                     ---------                               -----------         ------                     CBC Auto Differential[[id number]]        Abnormal            Final result                 Please view results for these tests on the individual orders.    CBC Auto Differential [[id number]]  (Abnormal) Collected: 12/20/22 0719    Specimen: Blood Updated: 12/20/22 0734     WBC 11.12 10*3/mm3      RBC 4.43 10*6/mm3      Hemoglobin 13.9 g/dL      Hematocrit 41.1 %      MCV 92.8 fL      MCH 31.4 pg      MCHC 33.8 g/dL      RDW 11.9 %      RDW-SD 40.8 fl      MPV 11.0 fL      Platelets 222 10*3/mm3      Neutrophil % 76.6 %      Lymphocyte % 13.6 %      Monocyte % 7.8 %      Eosinophil % 1.3 %      Basophil % 0.3 %      Immature Grans % 0.4 %      Neutrophils, Absolute 8.53 10*3/mm3      Lymphocytes, Absolute 1.51 10*3/mm3      Monocytes, Absolute 0.87 10*3/mm3      Eosinophils, Absolute 0.14 10*3/mm3      Basophils, Absolute 0.03 10*3/mm3      Immature Grans, Absolute 0.04 10*3/mm3      nRBC 0.0 /100 WBC     POC Glucose Once [[id number]]  (Abnormal) Collected: 12/19/22 1304    Specimen: Blood Updated: 12/19/22 1315     Glucose 139 mg/dL      Comment: : [id number] Prema Orellana ID: [id number]       POC Glucose Once [[id number]]  (Normal) Collected: 12/19/22 0851    Specimen: Blood Updated: 12/19/22 0902     Glucose 106 mg/dL      Comment: : [id number] Florence Cross ID: [id number]             Imaging Results (Last 72 Hours)     Procedure Component Value Units Date/Time    XR Abdomen KUB [[id number]] Collected: 12/19/22 1316     Updated: 12/19/22 1320    Narrative:      EXAM/TECHNIQUE:  XR ABDOMEN KUB-     INDICATION: no count in or     COMPARISON: None available.     FINDINGS:     Postoperative change of L5-S1 anterior fusion without evidence of  complication. No unexpected radiopaque foreign body. LEFT L4-L5 hardware  is also present. Multilevel lumbar spine degenerative change.  Nonobstructive bowel gas pattern.       Impression:         No unexpected radiopaque foreign body.  This report was finalized on 12/19/2022 13:17 by Dr. Demetris Lomas MD.    XR Spine Lumbar AP & Lateral [[id number]] Collected: 12/19/22 1316     Updated: 12/19/22 1319    Narrative:      XR SPINE LUMBAR AP AND LATERAL- 12/19/2022 1:00 PM CST     HISTORY: alif     COMPARISON: None     FLUOROSCOPY TIME: 11.2 seconds     FLUOROSCOPY DOSE: 10.0 mGy     NUMBER OF IMAGES: 7       Impression:         Intraoperative fluoroscopic images during lumbar fusion.     Please refer to the operative note for more details.   This report was finalized on 12/19/2022 13:16 by Dr. Demetris Lomas MD.    FL C Arm During Surgery [[id number]] Resulted: 12/19/22 1305     Updated: 12/19/22 1305    Narrative:      This procedure was auto-finalized with no dictation required.                Assessment:    Condition: In stable condition.  Improving.       Plan:   Transfer Plan: To operating room today for posterior aspect of surgery.  Encourage ambulation and per physical therapy.  (    Type 2 diabetes-blood sugars were over 300 initially, addition of sliding scale insulin they are 134 this morning.  We will follow-up after posterior aspect of surgery today.    Constipation/obstipation-continue with stool softeners and can add cathartic after posterior aspect of surgery today.    Pain is remarkably well controlled.    Patient medically stable for surgical intervention we will follow after surgery with expectations of discharge home 1-2 days    ).     Problem List:     Lumbago of multiple sites in spine with sciatica    Lumbosacral disc disease    Chronic  bilateral low back pain without sciatica    Type 2 diabetes mellitus with hyperglycemia, without long-term current use of insulin (HCC)    Essential hypertension    Drug-induced constipation    Adolph Chao MD  12/21/2022                                                  Electronically signed by Adolph Chao MD at 12/21/22 0727     Michael Wallace PA at 12/20/22 0708     Attestation signed by CHARITO Garcia MD at 12/20/22 0738    I have reviewed this documentation and agree.                  [facility]  Spine Surgery  TING Whelan   Progress Note        Subjective/Overnight Events:  Feeling well this AM, no acute issues, pain controlled, voiding, up ad clarisse.    Vitals  Vitals:    12/19/22 1513 12/19/22 1922 12/19/22 2300 12/20/22 0307   BP:  151/73 130/56 128/62   BP Location:  Left arm Left arm Left arm   Patient Position:  Lying Lying Lying   Pulse:  70 65 63   Resp:  18 16 16   Temp: Comment: Pt working with PT 97.5 °F (36.4 °C) 97.9 °F (36.6 °C) 97.7 °F (36.5 °C)   TempSrc:  Oral Oral Oral   SpO2:  100% 100% 100%   Weight:       Height:           Current Facility-Administered Medications   Medication Dose Route Frequency Provider Last Rate Last Admin   • acetaminophen (TYLENOL) tablet 650 mg  650 mg Oral Q4H PRN CHARITO Garcia MD       • atorvastatin (LIPITOR) tablet 20 mg  20 mg Oral Daily CHARITO Garcia MD   20 mg at 12/19/22 1521   • [START ON 12/21/2022] ceFAZolin in 0.9% normal saline (ANCEF) IVPB solution 2 g  2 g Intravenous On Call to OR Michael Wallace PA       • cloNIDine (CATAPRES) tablet 0.1 mg  0.1 mg Oral TID CHARITO Garcia MD   0.1 mg at 12/19/22 2013   • cyclobenzaprine (FLEXERIL) tablet 10 mg  10 mg Oral TID PRN CHARITO Garcia MD       • gabapentin (NEURONTIN) capsule 300 mg  300 mg Oral TID CHARITO Garcia MD   300 mg at 12/19/22 2013   • glipizide (GLUCOTROL) tablet 5 mg  5 mg Oral  Daily CHARITO Garcia MD   5 mg at 12/19/22 1521   • hydroCHLOROthiazide (HYDRODIURIL) tablet 12.5 mg  12.5 mg Oral Daily CHARITO Garcia MD   12.5 mg at 12/19/22 1521   • HYDROmorphone (DILAUDID) injection 1 mg  1 mg Intravenous Q3H PRN CHARITO Garcia MD        And   • naloxone (NARCAN) injection 0.4 mg  0.4 mg Intravenous Q5 Min PRN CHARITO Garcia MD       • losartan (COZAAR) tablet 25 mg  25 mg Oral Daily CHARITO Garcia MD   25 mg at 12/19/22 1521   • metFORMIN (GLUCOPHAGE) tablet 1,000 mg  1,000 mg Oral BID With Meals CHARITO Garcia MD   1,000 mg at 12/19/22 1755   • ondansetron (ZOFRAN) tablet 4 mg  4 mg Oral Q6H PRN CHARITO Garcia MD        Or   • ondansetron (ZOFRAN) injection 4 mg  4 mg Intravenous Q6H PRN CHARITO Garcia MD       • oxyCODONE-acetaminophen (PERCOCET) 7.5-325 MG per tablet 1 tablet  1 tablet Oral Q4H PRN CHARITO Garcia MD       • oxyCODONE-acetaminophen (PERCOCET) 7.5-325 MG per tablet 2 tablet  2 tablet Oral Q4H PRN CHARITO Garcia MD       • polyethylene glycol (MIRALAX) packet 17 g  17 g Oral Daily PRN CHARITO Garcia MD       • sennosides-docusate (PERICOLACE) 8.6-50 MG per tablet 1 tablet  1 tablet Oral BID CHARITO Garcia MD   1 tablet at 12/19/22 2013   • sodium chloride 0.9 % flush 10 mL  10 mL Intravenous PRN CHARITO Garcia MD   10 mL at 12/19/22 2013   • sodium chloride 0.9 % flush 3 mL  3 mL Intravenous Q12H CHARITO Garcia MD   3 mL at 12/19/22 2048   • sodium chloride 0.9 % infusion 40 mL  40 mL Intravenous PRN CHARITO Garcia MD       • sodium chloride 0.9 % infusion  100 mL/hr Intravenous Continuous CHARITO Garcia  mL/hr at 12/19/22 1522 100 mL/hr at 12/19/22 1522       PHYSICAL EXAM:    Orientation:  alert and oriented to person, place, and time    Incision:  no significant drainage    Upper Extremity Motor :  5/5 bilaterally    Upper Motor Neuron Signs:  none     Lower Extremity Motor :  equal  bilaterally    Lower Extremity Sensory:  Tibial nerve: Intact, Superficial peroneal nerve: Intact and Deep peroneal nerve: Intact    Flatus:  flatus    ABNORMAL EXAM FINDINGS:  none    LABS:    Lab Results (last 24 hours)     Procedure Component Value Units Date/Time    POC Glucose Once [[id number]]  (Abnormal) Collected: 12/19/22 1304    Specimen: Blood Updated: 12/19/22 1315     Glucose 139 mg/dL      Comment: : [id number] Prema Orellana ID: [id number]       POC Glucose Once [[id number]]  (Normal) Collected: 12/19/22 0851    Specimen: Blood Updated: 12/19/22 0902     Glucose 106 mg/dL      Comment: : [id number] Florence Cross ID: [id number]             ASSESSMENT AND PLAN:    Post operative day 1 status post ALIF L5/S1    1:  Activity Level:  Up with PT/OT, ad clarisse  2:  Pain Control:  Oral analgesia  3:  Discharge Planning:  Pending completion of staged procedures   4:  Other:  NPO after midnight, ABX on call to OR      Electronically signed by TING Whelan 12/20/2022 07:08 CST              Electronically signed by CHARITO Garcia MD at 12/20/22 0738          Consult Notes (last 48 hours)      Adolph Chao MD at 12/20/22 0749              Consult Note    Referring Provider: Dr. Garcia  Reason for Consultation: Medical management    Patient Care Team:  Provider, No Known as PCP - General    Chief complaint chronic low back pain    Subjective .     History of present illness:  The patient presents today for surgical correction after failing conservative management.  Outpatient work-up has been reviewed through provided outpatient notes per attending.  They have been through anti-inflammatories, muscle relaxers, and pain medication.  The pain has progressed to the point in time where it is affecting their activities of daily living and after being explained all their options, elected to undergo surgical correction.  Their primary care physician does not attend here at [facility] [place], operated by [facility]; therefore, I have been asked to take care of their primary medical needs in the perioperative period.  The postoperative pain is as expected.  There are no other precipitating or relieving factors. I have been requested by the Attending Physician to provide Medical Consultation in the perioperative period. The patient understands my role in their hospitalization and agrees with my treatment plan. They understand the importance of follow up with their PCP upon discharge  from [facility] for any concerns or abnormalities.  All questions were encouraged and answered to the best of my ability.    Pleasant 62-year-old gentleman that presents to Dr. Garcia's service for post op day #1 from a left with expected posterior aspect of surgery tomorrow.  Patient has type 2 diabetes and psoriasis but has been well controlled.  Blood sugar was 106 this morning.  Patient triggered positive for sepsis with a white count of 11,000 but there are no signs of infection suspect this is all stress related.      REVIEW OF SYSTEMS:    CONSTITUTIONAL:  Negative for anorexia, chills, fevers, night sweats and weight loss  EYES:  negative for eye dryness, icterus and redness  HEENT:   negative for dental problems, epistaxis, facial trauma and thrush  RESPIRATORY:  negative for chest tightness, cough, dyspnea on exertion, pneumonia and sputum  CARDIOVASCULAR: negative for chest pain, dyspnea, exertional chest pressure/discomfort, irregular heart beat, palpitations, paroxysmal nocturnal dyspnea and syncope  GASTROINTESTINAL:  negative for abdominal pain, hematemesis, jaundice, melena and rectal bleeding. No significant changes in bowel habits preoperatively.   MUSCULOSKELETAL:  negative for muscle weakness, myalgias and neck pain, outside of surgical issues noted above  NEUROLOGICAL:   negative for dizziness, headaches, seizures, speech problems, tremors and vertigo  INTEGUMENT: negative for pruritus, rash, skin color change  and skin lesion(s)         History    Past Medical History:   Diagnosis Date   • Allergy to alpha-gal    • Arthritis    • Chronic bilateral low back pain without sciatica 2022   • Diabetes mellitus (HCC)    • Hearing loss, left    • History of staph infection    • Hypertension    • Low back pain    • Lumbosacral disc disease 2022   • Psoriasis    • Sleep apnea    • Tinnitus    • Vision disturbance     using rx eyedrops     Past Surgical History:   Procedure Laterality Date   • BACK SURGERY      x 2   • CHOLECYSTECTOMY     • COLONOSCOPY     • GANGLION CYST EXCISION Left     wrist   • LIPOMA EXCISION      back     History reviewed. No pertinent family history.  Social History     Tobacco Use   • Smoking status: Former     Types: Cigarettes     Quit date:      Years since quittin.9   • Smokeless tobacco: Never   Vaping Use   • Vaping Use: Never used   Substance Use Topics   • Alcohol use: Yes     Comment: rarely   • Drug use: Never     Medications Prior to Admission   Medication Sig Dispense Refill Last Dose   • aspirin 81 MG EC tablet Take 81 mg by mouth Daily.   2022 at 1400   • atorvastatin (LIPITOR) 20 MG tablet Take 20 mg by mouth Daily.   2022   • cloNIDine (CATAPRES) 0.1 MG tablet Take 0.1 mg by mouth 3 (Three) Times a Day.   2022   • gabapentin (NEURONTIN) 300 MG capsule Take 300 mg by mouth 3 (Three) Times a Day.   2022 at 0400   • glipizide (GLUCOTROL) 5 MG tablet Take 5 mg by mouth Daily.   2022   • hydroCHLOROthiazide (HYDRODIURIL) 12.5 MG tablet Take 12.5 mg by mouth Daily.   2022 at 1100   • losartan (COZAAR) 25 MG tablet Take 25 mg by mouth Daily.   2022 at 0700   • metFORMIN (GLUCOPHAGE) 1000 MG tablet Take 1,000 mg by mouth 2 (Two) Times a Day With Meals.   2022 at 1700   • etanercept (ENBREL) 50 MG/ML solution prefilled syringe injection Inject 50 mg under the skin into the appropriate area as directed 1 (One) Time Per Week.   More  than a month       Allergies:  Alpha-gal and Penicillins    Objective     Vital Signs   Temp:  [96.7 °F (35.9 °C)-97.9 °F (36.6 °C)] 97.7 °F (36.5 °C)  Heart Rate:  [52-81] 63  Resp:  [10-18] 16  BP: ()/(53-86) 128/62          Physical Exam:  Constitutional: oriented to person, place, and time. appears well-developed.   Head: Normocephalic and atraumatic.   Eyes: Pupils are equal, round, and reactive to light.  No icterus or erythema  Neck: Neck supple.  Without masses or carotid bruit  Cardiovascular: Regular rhythm and normal heart sounds.  No significant lift, rub or murmur noted  Pulmonary/Chest: Effort normal and breath sounds normal. CTAB, encourage deep breathing.  Abdominal: Soft. Bowel sounds are normal to hypoactive. No significant distension. There is no rebound and no guarding.   Musculoskeletal: Normal range of motion and no edema or tenderness outside of surgical area.   Neurological: Pt is alert and oriented to person, place, and time.  normal reflexes present.  Somewhat sedated from anesthesia and pain medicine noted  Skin: Skin is warm and dry.  No new rashes of concern.    Results Review:   I reviewed the patient's new imaging results and agree with the interpretation.      Assessment & Plan       Lumbago of multiple sites in spine with sciatica    Lumbosacral disc disease    Chronic bilateral low back pain without sciatica    Type 2 diabetes mellitus with hyperglycemia, without long-term current use of insulin (HCC)    Essential hypertension    Drug-induced constipation      Type 2 DM- review labs and home medication. Discuss with patient importance of blood sugar control in the healing process. Review diet, medical,and lifestyle modifications for optimal medical treatment. Will restart their regular diabetic regimen and make consult for diabetic education / dietician available upon request.  Will restart his Glucophage and hold his Glucotrol while he is n.p.o. for surgery and place him on  low-dose sliding scale correction insulin dose.    Hypertension-review pre and post BP's,will restart home BP meds when BP allows. Recent studies demonstrate the need for patience and less reactivity for precipitous drop of BP in the acute care setting. Will educate staff to use other modalities to help reduce MAP prior to adding additional medical interventions. Add clonidine 0.1 mg every 4 hours prn if bp> 140/90,monitor BP and adjust meds as necessary.      Anemia post-op as expected.  Will check iron, B12,and folate if significant. Replenish substrates as needed.Transfuse at acceptable levels depending on clinical judgement and comorbidities.  We will check periodically throughout hospitalization and educate patient about following up with their PCP to ensure levels returned to normal.      Constipation-educate patient about the ill effects of anesthesia and narcotic pain medication on the normal peristalsis of the gut.  Encourage judicious use of pain medication and early ambulation to aid in bowel functioning returning to normal.  We will start with Miralax 1 capful BID until BM, then decrease to 1 x a day,then can step up to a prokinetic which can be used for opioid induced constipation,and ultimately end up with Relistor 12 mcg subq every 48 hours to block the effect of narcotics on the gut.  Our plan is to soften the stools so after surgical intervention if stimulation is needed with magnesium citrate and or Dulcolax suppository the stool will move much easier.  Encourage water consumption to help soften the stool as well.    Patient triggered positive for sepsis there is no signs of infection his white count slightly elevated 11,000 this is mostly a stress reaction.      Explained to patient and staff that we were consulted for medical management during their acute care hospitalization. The Medical Consultation was requested by the Attending Physician. The patient has been recommended to f/u with their  regular primary care provider concerning any further treatment and review of abnormalities found during their hospitalization at [facility].They agree with the treatment plan as well as understand our role in their hospitalization. All questions were encouraged and answered to best of my ability.    I discussed the patient's findings and my recommendations with patient, nursing staff and consulting provider    Adolph Chao MD  12/20/22  07:49 CST            Electronically signed by Adolph Chao MD at 12/20/22 4530

## 2022-12-22 LAB
ABO GROUP BLD: NORMAL
BLD GP AB SCN SERPL QL: NEGATIVE
GLUCOSE BLDC GLUCOMTR-MCNC: 117 MG/DL (ref 70–130)
GLUCOSE BLDC GLUCOMTR-MCNC: 157 MG/DL (ref 70–130)
GLUCOSE BLDC GLUCOMTR-MCNC: 175 MG/DL (ref 70–130)
RH BLD: POSITIVE
T&S EXPIRATION DATE: NORMAL

## 2022-12-22 PROCEDURE — 63710000001 INSULIN LISPRO (HUMAN) PER 5 UNITS: Performed by: ORTHOPAEDIC SURGERY

## 2022-12-22 PROCEDURE — 86901 BLOOD TYPING SEROLOGIC RH(D): CPT | Performed by: PHYSICIAN ASSISTANT

## 2022-12-22 PROCEDURE — 97116 GAIT TRAINING THERAPY: CPT

## 2022-12-22 PROCEDURE — 86850 RBC ANTIBODY SCREEN: CPT | Performed by: PHYSICIAN ASSISTANT

## 2022-12-22 PROCEDURE — 97168 OT RE-EVAL EST PLAN CARE: CPT

## 2022-12-22 PROCEDURE — 25010000002 ONDANSETRON PER 1 MG: Performed by: ORTHOPAEDIC SURGERY

## 2022-12-22 PROCEDURE — 82962 GLUCOSE BLOOD TEST: CPT

## 2022-12-22 PROCEDURE — 86900 BLOOD TYPING SEROLOGIC ABO: CPT | Performed by: PHYSICIAN ASSISTANT

## 2022-12-22 PROCEDURE — 97535 SELF CARE MNGMENT TRAINING: CPT

## 2022-12-22 RX ORDER — POLYETHYLENE GLYCOL 3350 17 G/17G
17 POWDER, FOR SOLUTION ORAL 2 TIMES DAILY
Status: DISCONTINUED | OUTPATIENT
Start: 2022-12-22 | End: 2022-12-24 | Stop reason: HOSPADM

## 2022-12-22 RX ORDER — CLINDAMYCIN PHOSPHATE 900 MG/50ML
900 INJECTION INTRAVENOUS ONCE
Status: COMPLETED | OUTPATIENT
Start: 2022-12-23 | End: 2022-12-23

## 2022-12-22 RX ORDER — BISACODYL 10 MG
10 SUPPOSITORY, RECTAL RECTAL DAILY PRN
Status: DISCONTINUED | OUTPATIENT
Start: 2022-12-22 | End: 2022-12-24 | Stop reason: HOSPADM

## 2022-12-22 RX ADMIN — GABAPENTIN 300 MG: 300 CAPSULE ORAL at 08:48

## 2022-12-22 RX ADMIN — Medication 3 ML: at 20:08

## 2022-12-22 RX ADMIN — GABAPENTIN 300 MG: 300 CAPSULE ORAL at 15:05

## 2022-12-22 RX ADMIN — INSULIN LISPRO 2 UNITS: 100 INJECTION, SOLUTION INTRAVENOUS; SUBCUTANEOUS at 08:48

## 2022-12-22 RX ADMIN — OXYCODONE HYDROCHLORIDE AND ACETAMINOPHEN 2 TABLET: 7.5; 325 TABLET ORAL at 04:23

## 2022-12-22 RX ADMIN — INSULIN LISPRO 2 UNITS: 100 INJECTION, SOLUTION INTRAVENOUS; SUBCUTANEOUS at 12:17

## 2022-12-22 RX ADMIN — CLONIDINE HYDROCHLORIDE 0.1 MG: 0.1 TABLET ORAL at 15:04

## 2022-12-22 RX ADMIN — OXYCODONE HYDROCHLORIDE AND ACETAMINOPHEN 1 TABLET: 7.5; 325 TABLET ORAL at 12:54

## 2022-12-22 RX ADMIN — DOCUSATE SODIUM 50 MG AND SENNOSIDES 8.6 MG 1 TABLET: 8.6; 5 TABLET, FILM COATED ORAL at 20:05

## 2022-12-22 RX ADMIN — OXYCODONE HYDROCHLORIDE AND ACETAMINOPHEN 2 TABLET: 7.5; 325 TABLET ORAL at 20:11

## 2022-12-22 RX ADMIN — CLONIDINE HYDROCHLORIDE 0.1 MG: 0.1 TABLET ORAL at 08:48

## 2022-12-22 RX ADMIN — OXYCODONE HYDROCHLORIDE AND ACETAMINOPHEN 1 TABLET: 7.5; 325 TABLET ORAL at 08:48

## 2022-12-22 RX ADMIN — ONDANSETRON 4 MG: 2 INJECTION INTRAMUSCULAR; INTRAVENOUS at 08:53

## 2022-12-22 RX ADMIN — METFORMIN HYDROCHLORIDE 1000 MG: 500 TABLET ORAL at 17:23

## 2022-12-22 RX ADMIN — GABAPENTIN 300 MG: 300 CAPSULE ORAL at 20:06

## 2022-12-22 RX ADMIN — ATORVASTATIN CALCIUM 20 MG: 10 TABLET, FILM COATED ORAL at 08:48

## 2022-12-22 RX ADMIN — CLINDAMYCIN IN 5 PERCENT DEXTROSE 900 MG: 18 INJECTION, SOLUTION INTRAVENOUS at 00:47

## 2022-12-22 RX ADMIN — Medication 3 ML: at 08:48

## 2022-12-22 RX ADMIN — HYDROCHLOROTHIAZIDE 12.5 MG: 25 TABLET ORAL at 08:48

## 2022-12-22 RX ADMIN — CYCLOBENZAPRINE 10 MG: 10 TABLET, FILM COATED ORAL at 20:11

## 2022-12-22 RX ADMIN — METFORMIN HYDROCHLORIDE 1000 MG: 500 TABLET ORAL at 08:48

## 2022-12-22 RX ADMIN — POLYETHYLENE GLYCOL 3350 17 G: 17 POWDER, FOR SOLUTION ORAL at 09:00

## 2022-12-22 RX ADMIN — DOCUSATE SODIUM 50 MG AND SENNOSIDES 8.6 MG 1 TABLET: 8.6; 5 TABLET, FILM COATED ORAL at 08:48

## 2022-12-22 RX ADMIN — POLYETHYLENE GLYCOL 3350 17 G: 17 POWDER, FOR SOLUTION ORAL at 20:06

## 2022-12-22 RX ADMIN — LOSARTAN POTASSIUM 25 MG: 50 TABLET, FILM COATED ORAL at 08:48

## 2022-12-22 RX ADMIN — CLONIDINE HYDROCHLORIDE 0.1 MG: 0.1 TABLET ORAL at 20:05

## 2022-12-22 RX ADMIN — CYCLOBENZAPRINE 10 MG: 10 TABLET, FILM COATED ORAL at 04:27

## 2022-12-22 RX ADMIN — ACETAMINOPHEN 650 MG: 325 TABLET, FILM COATED ORAL at 14:55

## 2022-12-22 NOTE — PLAN OF CARE
Goal Outcome Evaluation:  Plan of Care Reviewed With: patient        Progress: improving  Outcome Evaluation: PT tx completed. Pt continues to demonstrate  LLE weakness. L hip flexion is 2+/5, quad/dorsiflexion, and EHL are till 3/5. No buckling of LLE during gait this am. Rates pn 5/10. Amb with r wx 175'. Benefit cont PT for strengthening.

## 2022-12-22 NOTE — PROGRESS NOTES
[facility]  Spine Surgery  TING Whelan   Progress Note        Subjective/Overnight Events:  No acute issues overnight, pain controlled, voiding, weakness in LLE noted    Vitals  Vitals:    12/22/22 0404 12/22/22 0745 12/22/22 1102 12/22/22 1504   BP: 137/61 153/68 133/57 106/61   BP Location: Left arm Left arm Left arm    Patient Position: Lying Lying Lying    Pulse: 90 85 87 86   Resp: 18 18 18    Temp: 99.2 °F (37.3 °C) 98.1 °F (36.7 °C) 99.2 °F (37.3 °C) 99.3 °F (37.4 °C)   TempSrc: Oral Oral Oral Axillary   SpO2: 98% 100% 96% 94%   Weight:       Height:           Current Facility-Administered Medications   Medication Dose Route Frequency Provider Last Rate Last Admin   • acetaminophen (TYLENOL) tablet 650 mg  650 mg Oral Q4H PRN CHARITO Garcia MD   650 mg at 12/22/22 1455   • atorvastatin (LIPITOR) tablet 20 mg  20 mg Oral Daily CHARITO Garcia MD   20 mg at 12/22/22 0848   • atropine sulfate injection 0.5 mg  0.5 mg Intravenous Once PRN CHARITO Garcia MD       • bisacodyl (DULCOLAX) suppository 10 mg  10 mg Rectal Daily PRN Adolph Chao MD       • [START ON 12/23/2022] clindamycin (CLEOCIN) 900 mg in dextrose 5% 50 mL IVPB (premix)  900 mg Intravenous Once Michael Wallace PA       • cloNIDine (CATAPRES) tablet 0.1 mg  0.1 mg Oral TID CHARITO Garcia MD   0.1 mg at 12/22/22 1504   • cyclobenzaprine (FLEXERIL) tablet 10 mg  10 mg Oral TID PRN CHARITO Garcia MD   10 mg at 12/22/22 0427   • dextrose (D50W) (25 g/50 mL) IV injection 25 g  25 g Intravenous Q15 Min PRN CHARITO Garcia MD       • dextrose (GLUTOSE) oral gel 15 g  15 g Oral Q15 Min PRN CHARITO Garcia MD       • fentaNYL citrate (PF) (SUBLIMAZE) injection 25 mcg  25 mcg Intravenous Q5 Min PRN CHARITO Garcia MD       • flumazenil (ROMAZICON) injection 0.2 mg  0.2 mg Intravenous PRN CHARITO Garcia MD       • gabapentin (NEURONTIN) capsule 300 mg  300  mg Oral TID CHARITO Garcia MD   300 mg at 12/22/22 1505   • glucagon (human recombinant) (GLUCAGEN DIAGNOSTIC) injection 1 mg  1 mg Intramuscular Q15 Min PRN CHARITO Garcia MD       • hydroCHLOROthiazide (HYDRODIURIL) tablet 12.5 mg  12.5 mg Oral Daily CHARITO Garcia MD   12.5 mg at 12/22/22 0848   • HYDROmorphone (DILAUDID) injection 0.5 mg  0.5 mg Intravenous Q10 Min PRN CHARITO Garcia MD   0.5 mg at 12/21/22 1202   • HYDROmorphone (DILAUDID) injection 1 mg  1 mg Intravenous Q3H PRN CHARITO Garcia MD        And   • naloxone (NARCAN) injection 0.4 mg  0.4 mg Intravenous Q5 Min PRN CHARITO Garcia MD       • Insulin Lispro (humaLOG) injection 2-7 Units  2-7 Units Subcutaneous TID AC CHARITO Garcia MD   2 Units at 12/22/22 1217   • labetalol (NORMODYNE,TRANDATE) injection 5 mg  5 mg Intravenous Q5 Min PRN CHARITO Garcia MD       • losartan (COZAAR) tablet 25 mg  25 mg Oral Daily CHARITO Garcia MD   25 mg at 12/22/22 0848   • magnesium hydroxide (MILK OF MAGNESIA) suspension 10 mL  10 mL Oral Daily PRN Adolph Chao MD       • metFORMIN (GLUCOPHAGE) tablet 1,000 mg  1,000 mg Oral BID With Meals CHARITO Garcia MD   1,000 mg at 12/22/22 1723   • naloxone (NARCAN) injection 0.04 mg  0.04 mg Intravenous PRN CHARITO Garcia MD       • ondansetron (ZOFRAN) tablet 4 mg  4 mg Oral Q6H PRN CHARITO Garcia MD        Or   • ondansetron (ZOFRAN) injection 4 mg  4 mg Intravenous Q6H PRN CHARITO Garcia MD   4 mg at 12/22/22 0853   • ondansetron (ZOFRAN) injection 4 mg  4 mg Intravenous Q15 Min PRN CHARITO Garcia MD       • oxyCODONE-acetaminophen (PERCOCET)  MG per tablet 1 tablet  1 tablet Oral Once PRN CHARITO Garcia MD       • oxyCODONE-acetaminophen (PERCOCET) 7.5-325 MG per tablet 1 tablet  1 tablet Oral Q4H PRN CHARITO Garcia MD   1 tablet at 12/22/22 1254   • oxyCODONE-acetaminophen (PERCOCET) 7.5-325 MG per tablet 2 tablet  2 tablet  Oral Q4H PRN CHARITO Garcia MD   2 tablet at 12/22/22 0423   • polyethylene glycol (MIRALAX) packet 17 g  17 g Oral BID Adolph Chao MD   17 g at 12/22/22 0900   • sennosides-docusate (PERICOLACE) 8.6-50 MG per tablet 1 tablet  1 tablet Oral BID CHARITO Garcia MD   1 tablet at 12/22/22 0848   • sodium chloride 0.9 % flush 10 mL  10 mL Intravenous PRN CHARITO Garcia MD   10 mL at 12/19/22 2013   • sodium chloride 0.9 % flush 3 mL  3 mL Intravenous Q12H CHARITO Garcia MD   3 mL at 12/22/22 0848   • sodium chloride 0.9 % infusion 40 mL  40 mL Intravenous PRN CHARITO Garcia MD           PHYSICAL EXAM:    Orientation:  alert and oriented to person, place, and time    Incision:  no significant drainage    Upper Extremity Motor :  5/5 bilaterally    Upper Motor Neuron Signs:  none     Lower Extremity Motor :  LLE weakness, RLE intact    Lower Extremity Sensory:  Tibial nerve: LLE weakness, Superficial peroneal nerve: Intact and Deep peroneal nerve: Intact    Flatus:  flatus    ABNORMAL EXAM FINDINGS:  LLE weakness    LABS:    Lab Results (last 24 hours)     Procedure Component Value Units Date/Time    POC Glucose Once [[id number]]  (Normal) Collected: 12/22/22 1621    Specimen: Blood Updated: 12/22/22 1632     Glucose 117 mg/dL      Comment: : [id number] Rex MadisonMeter ID: [id number]       POC Glucose Once [[id number]]  (Abnormal) Collected: 12/22/22 1107    Specimen: Blood Updated: 12/22/22 1128     Glucose 175 mg/dL      Comment: : [id number] Dakota ArrietaehaMeter ID: [id number]       POC Glucose Once [[id number]]  (Abnormal) Collected: 12/22/22 0719    Specimen: Blood Updated: 12/22/22 0812     Glucose 157 mg/dL      Comment: : [id number] Dakota BreehaMeter ID: [id number]             ASSESSMENT AND PLAN:    Post operative day 3 & 1 status post ALIF L5/S1 & LLIF L4/5    1:  Activity Level:  Up with PT/OT  2:  Pain Control:  Continue current   3:  Discharge Planning:  Pending  completion of next stage  4:  Other:  NPO after midnight, ABX on call to OR      Electronically signed by TING Whelan 12/22/2022 17:39 CST

## 2022-12-22 NOTE — PLAN OF CARE
Goal Outcome Evaluation:  Plan of Care Reviewed With: patient        Progress: no change  Outcome Evaluation: Pt A&Ox4. , room air. Denies n/t. C/o pain, prn pain medication given with some relief. RINA NATH weakness- MD aware. PPP. BG monitored. abd and flank drsgs - CDI. up with LSO x1. SCD. Call light within reach.Safety maintained. voiding.

## 2022-12-22 NOTE — THERAPY RE-EVALUATION
Acute Care - Occupational Therapy Re-Evaluation  [facility]     Patient Name: Manuel Moscoso  : 1960  MRN: [id number]  Today's Date: 2022  Onset of Illness/Injury or Date of Surgery: 22  Date of Referral to OT: 22  Referring Physician: Dr. Garcia    Admit Date: 2022       ICD-10-CM ICD-9-CM   1. Chronic bilateral low back pain without sciatica  M54.50 724.2    G89.29 338.29   2. Impaired mobility  Z74.09 799.89   3. Decreased activities of daily living (ADL)  Z78.9 V49.89     Patient Active Problem List   Diagnosis   • Lumbosacral disc disease   • Chronic bilateral low back pain without sciatica   • Lumbago of multiple sites in spine with sciatica   • Type 2 diabetes mellitus with hyperglycemia, without long-term current use of insulin (HCC)   • Essential hypertension   • Drug-induced constipation     Past Medical History:   Diagnosis Date   • Allergy to alpha-gal    • Arthritis    • Chronic bilateral low back pain without sciatica 2022   • Diabetes mellitus (HCC)    • Hearing loss, left    • History of staph infection    • Hypertension    • Low back pain    • Lumbosacral disc disease 2022   • Psoriasis    • Sleep apnea    • Tinnitus    • Vision disturbance     using rx eyedrops     Past Surgical History:   Procedure Laterality Date   • BACK SURGERY      x 2   • CHOLECYSTECTOMY     • COLONOSCOPY     • GANGLION CYST EXCISION Left     wrist   • LIPOMA EXCISION      back   • LUMBAR FUSION Left 2022    Procedure: LEFT LATERAL LUMBAR INTERBODY FUSION WITH INSTRUMENTATION L4-5;  Surgeon: CHARITO Garcia MD;  Location: [facility];  Service: Orthopedic Spine;  Laterality: Left;         OT ASSESSMENT FLOWSHEET (last 12 hours)     OT Evaluation and Treatment     Row Name 22 0805                   OT Time and Intention    Subjective Information complains of;weakness;pain;numbness  B feet are n/t from neuropathy  -AC        Document Type re-evaluation  -AC         Mode of Treatment occupational therapy  -AC        Symptoms Noted During/After Treatment increased pain;nausea  -AC           General Information    Patient Profile Reviewed yes  -AC        Onset of Illness/Injury or Date of Surgery 12/21/22  -        Referring Physician Dr. Garcia  -        Prior Level of Function independent:;all household mobility;community mobility;gait;transfer;bed mobility;ADL's  -AC        Pertinent History of Current Functional Problem s/p L LLIF L4-5, s/p ALIF L5-S1 12/19/22, back, L buttock, thigh, and leg radicular pain, neurogenic claudication, DM periphal neuropathy  -AC        Existing Precautions/Restrictions fall;brace worn when out of bed;LSO;spinal  -AC        Barriers to Rehab physical barrier  -AC           Living Environment    Current Living Arrangements home  -AC        People in Home spouse  -AC           Pain Assessment    Pretreatment Pain Rating 6/10  -AC        Pain Location lower  -AC        Pain Location - back  -AC        Pre/Posttreatment Pain Comment B hips  -AC        Pain Intervention(s) Medication (See MAR);Repositioned;Ambulation/increased activity  -AC           Cognition    Orientation Status (Cognition) oriented x 4  -AC        Follows Commands (Cognition) WFL  -AC        Personal Safety Interventions elopement precautions initiated;fall prevention program maintained;gait belt;muscle strengthening facilitated;nonskid shoes/slippers when out of bed;supervised activity  -AC           Range of Motion Comprehensive    Comment, General Range of Motion WFL AROM BUE  -AC           Strength Comprehensive (MMT)    Comment, General Manual Muscle Testing (MMT) Assessment functionally 5/5 BUE  -AC           Sensory    Additional Documentation Sensory Assessment (Somatosensory) (Group)  -AC           Sensory Assessment (Somatosensory)    Sensory Subjective Reports numbness;tingling  B feet due to neuropathy  -AC           Activities of Daily Living    BADL  Assessment/Intervention upper body dressing;lower body dressing  -AC           Upper Body Dressing Assessment/Training    [place] Level (Upper Body Dressing) don;standby assist  -AC        Position (Upper Body Dressing) edge of bed sitting  -AC        Comment, (Upper Body Dressing) LSO  -AC           Lower Body Dressing Assessment/Training    [place] Level (Lower Body Dressing) don;socks;moderate assist (50% patient effort)  -AC        Position (Lower Body Dressing) edge of bed sitting  -AC        Comment, (Lower Body Dressing) unable to reach L foot due to significant hip flexor weakness  -AC           BADL Safety/Performance    Impairments, BADL Safety/Performance balance;endurance/activity tolerance;pain;strength  -AC           Bed Mobility    Bed Mobility supine-sit  -AC        Supine-Sit [place] (Bed Mobility) standby assist  -        Assistive Device (Bed Mobility) bed rails  -           Functional Mobility    Functional Mobility- Ind. Level contact guard assist  -        Functional Mobility- Device walker, front-wheeled  -        Functional Mobility- Comment bed to chair, difficulty advancing LLE  -           Transfer Assessment/Treatment    Transfers sit-stand transfer;stand-sit transfer  -AC           Sit-Stand Transfer    Sit-Stand [place] (Transfers) contact guard;verbal cues  -        Assistive Device (Sit-Stand Transfers) walker, front-wheeled  -AC           Stand-Sit Transfer    Stand-Sit [place] (Transfers) contact guard;verbal cues  -        Assistive Device (Stand-Sit Transfers) walker, front-wheeled  -           Safety Issues, Functional Mobility    Impairments Affecting Function (Mobility) balance;pain;strength;endurance/activity tolerance  -AC           Balance    Static Sitting Balance independent  -AC        Dynamic Sitting Balance independent  -AC        Position, Sitting Balance sitting edge of bed  -AC        Static Standing Balance contact guard  -         Dynamic Standing Balance contact guard  -AC        Position/Device Used, Standing Balance walker, front-wheeled  -AC           Wound 12/19/22 1109 Left lower abdomen Incision    Wound - Properties Group Placement Date: 12/19/22  -DT Placement Time: 1109  -DT Present on Hospital Admission: N  -DT Side: Left  -DT Orientation: lower  -DT Location: abdomen  -DT Primary Wound Type: Incision  -DT    Retired Wound - Properties Group Placement Date: 12/19/22  -DT Placement Time: 1109  -DT Present on Hospital Admission: N  -DT Side: Left  -DT Orientation: lower  -DT Location: abdomen  -DT Primary Wound Type: Incision  -DT    Retired Wound - Properties Group Date first assessed: 12/19/22  -DT Time first assessed: 1109  -DT Present on Hospital Admission: N  -DT Side: Left  -DT Location: abdomen  -DT Primary Wound Type: Incision  -DT       Wound 12/21/22 0924 Left flank Incision    Wound - Properties Group Placement Date: 12/21/22  -DT Placement Time: 0924  -DT Present on Hospital Admission: N  -DT Side: Left  -DT Location: flank  -DT Primary Wound Type: Incision  -DT    Retired Wound - Properties Group Placement Date: 12/21/22  -DT Placement Time: 0924  -DT Present on Hospital Admission: N  -DT Side: Left  -DT Location: flank  -DT Primary Wound Type: Incision  -DT    Retired Wound - Properties Group Date first assessed: 12/21/22  -DT Time first assessed: 0924  -DT Present on Hospital Admission: N  -DT Side: Left  -DT Location: flank  -DT Primary Wound Type: Incision  -DT       Plan of Care Review    Plan of Care Reviewed With patient  -AC        Progress no change  -AC        Outcome Evaluation OT reeval completed.  Pt alert and oriented x4.  He c/o 6/10 back pain and nausea.  Dry heaving once EOB.  Pt log rolled with SBA and bed rails.  Dons LSO with set up.  MaxA to don socks.  His LLE is very weak and he is unable to flex his hip against gravity.  He reports this is worse since yesterday but is partially due to pain  in his hips.  He transferred with CGA and ambulated to chair with CGA and rw.  OT will continue to treat to address the above deficits.  Recommend home with assist pending progress.  -AC           Positioning and Restraints    Pre-Treatment Position in bed  -AC        Post Treatment Position chair  -AC        In Chair sitting;call light within reach;encouraged to call for assist;with brace  -AC           Therapy Assessment/Plan (OT)    Date of Referral to OT 12/21/22  -AC        OT Diagnosis decreased adl  -AC        Rehab Potential (OT) good, to achieve stated therapy goals  -AC        Criteria for Skilled Therapeutic Interventions Met (OT) yes;meets criteria;skilled treatment is necessary  -AC        Therapy Frequency (OT) 5 times/wk  -AC        Predicted Duration of Therapy Intervention (OT) 10 days  -AC        Planned Therapy Interventions (OT) activity tolerance training;adaptive equipment training;BADL retraining;functional balance retraining;neuromuscular control/coordination retraining;occupation/activity based interventions;orthotic fabrication/fitting/training;patient/caregiver education/training;strengthening exercise;transfer/mobility retraining  -AC           OT Goals    Transfer Goal Selection (OT) transfer, OT goal 1  -AC        Bathing Goal Selection (OT) bathing, OT goal 1  -AC        Dressing Goal Selection (OT) dressing, OT goal 1  -AC           Transfer Goal 1 (OT)    Activity/Assistive Device (Transfer Goal 1, OT) bed-to-chair/chair-to-bed;toilet;shower chair;walker, rolling  -AC        [place] Level/Cues Needed (Transfer Goal 1, OT) modified independence  -AC        Time Frame (Transfer Goal 1, OT) long term goal (LTG);10 days  -AC        Progress/Outcome (Transfer Goal 1, OT) new goal  -AC           Bathing Goal 1 (OT)    Activity/Device (Bathing Goal 1, OT) lower body bathing  -AC        [place] Level/Cues Needed (Bathing Goal 1, OT) minimum assist (75% or more patient effort)  -AC         Time Frame (Bathing Goal 1, OT) long term goal (LTG);10 days  -AC        Progress/Outcomes (Bathing Goal 1, OT) new goal  -AC           Dressing Goal 1 (OT)    Activity/Device (Dressing Goal 1, OT) lower body dressing  -AC        Power/Cues Needed (Dressing Goal 1, OT) minimum assist (75% or more patient effort)  -AC        Time Frame (Dressing Goal 1, OT) long term goal (LTG);10 days  -AC        Progress/Outcome (Dressing Goal 1, OT) new goal  -AC              User Key  (r) = Recorded By, (t) = Taken By, (c) = Cosigned By    Initials Name Effective Dates    AC Abilio Castillo, OTR/L, CNT 04/09/19 -     Master Coronado RN 02/17/22 -                  Occupational Therapy Education     Title: PT OT SLP Therapies (In Progress)     Topic: Occupational Therapy (In Progress)     Point: ADL training (Done)     Description:   Instruct learner(s) on proper safety adaptation and remediation techniques during self care or transfers.   Instruct in proper use of assistive devices.              Learning Progress Summary           Patient Acceptance, E,TB,D, VU,DU by  at 12/22/2022 0859    Acceptance, E, VU by KASSIE at 12/19/2022 1533                   Point: Home exercise program (Not Started)     Description:   Instruct learner(s) on appropriate technique for monitoring, assisting and/or progressing therapeutic exercises/activities.              Learner Progress:  Not documented in this visit.          Point: Precautions (Done)     Description:   Instruct learner(s) on prescribed precautions during self-care and functional transfers.              Learning Progress Summary           Patient Acceptance, E,TB,D, VU,DU by  at 12/22/2022 0859    Acceptance, E, VU by KASSIE at 12/19/2022 1533                   Point: Body mechanics (Done)     Description:   Instruct learner(s) on proper positioning and spine alignment during self-care, functional mobility activities and/or exercises.              Learning Progress  Summary           Patient Acceptance, E,TB,D, VU,DU by  at 12/22/2022 0859                               User Key     Initials Effective Dates Name Provider Type Discipline     04/09/19 -  Abilio Castillo, OTR/L, AUDREY Occupational Therapist OT    JTRISHA 11/10/21 -  Steffi Tenorio OTR/L, CSRS Occupational Therapist OT                  OT Recommendation and Plan  Planned Therapy Interventions (OT): activity tolerance training, adaptive equipment training, BADL retraining, functional balance retraining, neuromuscular control/coordination retraining, occupation/activity based interventions, orthotic fabrication/fitting/training, patient/caregiver education/training, strengthening exercise, transfer/mobility retraining  Therapy Frequency (OT): 5 times/wk  Plan of Care Review  Plan of Care Reviewed With: patient  Progress: no change  Outcome Evaluation: OT reeval completed.  Pt alert and oriented x4.  He c/o 6/10 back pain and nausea.  Dry heaving once EOB.  Pt log rolled with SBA and bed rails.  Dons LSO with set up.  MaxA to don socks.  His LLE is very weak and he is unable to flex his hip against gravity.  He reports this is worse since yesterday but is partially due to pain in his hips.  He transferred with CGA and ambulated to chair with CGA and rw.  OT will continue to treat to address the above deficits.  Recommend home with assist pending progress.  Plan of Care Reviewed With: patient  Outcome Evaluation: OT reeval completed.  Pt alert and oriented x4.  He c/o 6/10 back pain and nausea.  Dry heaving once EOB.  Pt log rolled with SBA and bed rails.  Dons LSO with set up.  MaxA to don socks.  His LLE is very weak and he is unable to flex his hip against gravity.  He reports this is worse since yesterday but is partially due to pain in his hips.  He transferred with CGA and ambulated to chair with CGA and rw.  OT will continue to treat to address the above deficits.  Recommend home with assist pending  progress.     Outcome Measures     Row Name 12/22/22 0800 12/20/22 1000          How much help from another person do you currently need...    Turning from your back to your side while in flat bed without using bedrails? -- 4  -KJ     Moving from lying on back to sitting on the side of a flat bed without bedrails? -- 4  -KJ     Moving to and from a bed to a chair (including a wheelchair)? -- 4  -KJ     Standing up from a chair using your arms (e.g., wheelchair, bedside chair)? -- 4  -KJ     Climbing 3-5 steps with a railing? -- 4  -KJ     To walk in hospital room? -- 4  -KJ     AM-PAC 6 Clicks Score (PT) -- 24  -KJ        How much help from another is currently needed...    Putting on and taking off regular lower body clothing? 2  -AC --     Bathing (including washing, rinsing, and drying) 3  -AC --     Toileting (which includes using toilet bed pan or urinal) 3  -AC --     Putting on and taking off regular upper body clothing 4  -AC --     Taking care of personal grooming (such as brushing teeth) 4  -AC --     Eating meals 4  -AC --     AM-PAC 6 Clicks Score (OT) 20  -AC --        Functional Assessment    Outcome Measure Options AM-PAC 6 Clicks Daily Activity (OT)  -AC AM-[facility] 6 Clicks Basic Mobility (PT)  -KJ           User Key  (r) = Recorded By, (t) = Taken By, (c) = Cosigned By    Initials Name Provider Type    Abilio Xiong, OTR/L, AUDREY Occupational Therapist    Wendy Dawson, PTA Physical Therapist Assistant                Time Calculation:    Time Calculation- OT     Row Name 12/22/22 0859             Time Calculation- OT    OT Start Time 0805  -      OT Stop Time 0845  -      OT Time Calculation (min) 40 min  -      Total Timed Code Minutes- OT 10 minute(s)  -      OT Received On 12/22/22  -      OT Goal Re-Cert Due Date 01/01/23  -            User Key  (r) = Recorded By, (t) = Taken By, (c) = Cosigned By    Initials Name Provider Type     Abilio Castillo, OTR/L, AUDREY Occupational  Therapist              Therapy Charges for Today     Code Description Service Date Service Provider Modifiers Qty    [id number]  OT RE-EVAL 2 12/22/2022 Abilio Castillo, OTR/L, CNT GO 1    [id number]  OT SELF CARE/MGMT/TRAIN EA 15 MIN 12/22/2022 Abilio Castillo OTR/L, CNT GO 1               Abilio Castillo OTR/L, AUDREY  12/22/2022

## 2022-12-22 NOTE — THERAPY TREATMENT NOTE
Acute Care - Physical Therapy Treatment Note  [facility]     Patient Name: Manuel Moscoso  : 1960  MRN: [id number]  Today's Date: 2022   Onset of Illness/Injury or Date of Surgery: 22  Visit Dx:     ICD-10-CM ICD-9-CM   1. Chronic bilateral low back pain without sciatica  M54.50 724.2    G89.29 338.29   2. Impaired mobility  Z74.09 799.89   3. Decreased activities of daily living (ADL)  Z78.9 V49.89     Patient Active Problem List   Diagnosis   • Lumbosacral disc disease   • Chronic bilateral low back pain without sciatica   • Lumbago of multiple sites in spine with sciatica   • Type 2 diabetes mellitus with hyperglycemia, without long-term current use of insulin (HCC)   • Essential hypertension   • Drug-induced constipation     Past Medical History:   Diagnosis Date   • Allergy to alpha-gal    • Arthritis    • Chronic bilateral low back pain without sciatica 2022   • Diabetes mellitus (HCC)    • Hearing loss, left    • History of staph infection    • Hypertension    • Low back pain    • Lumbosacral disc disease 2022   • Psoriasis    • Sleep apnea    • Tinnitus    • Vision disturbance     using rx eyedrops     Past Surgical History:   Procedure Laterality Date   • BACK SURGERY      x 2   • CHOLECYSTECTOMY     • COLONOSCOPY     • GANGLION CYST EXCISION Left     wrist   • LIPOMA EXCISION      back   • LUMBAR FUSION Left 2022    Procedure: LEFT LATERAL LUMBAR INTERBODY FUSION WITH INSTRUMENTATION L4-5;  Surgeon: CHARITO Garcia MD;  Location: [facility];  Service: Orthopedic Spine;  Laterality: Left;     PT Assessment (last 12 hours)     PT Evaluation and Treatment     Row Name 22 0933          Physical Therapy Time and Intention    Subjective Information complains of;pain  -KJ     Document Type therapy note (daily note)  -KJ     Mode of Treatment physical therapy  -KJ     Patient Effort good  -KJ     Row Name 22 0933          General Information    Existing  Precautions/Restrictions brace worn when out of bed;fall;spinal;LSO  -KJ     Row Name 12/22/22 0933          Pain    Pretreatment Pain Rating 5/10  -KJ     Posttreatment Pain Rating 5/10  -KJ     Pain Location - Side/Orientation Left  -KJ     Pain Location lower  -KJ     Pain Location - back  -KJ     Pre/Posttreatment Pain Comment radiating down LLE groin>thigh  -KJ     Row Name 12/22/22 0933          Bed Mobility    Sidelying-Sit [place] (Bed Mobility) minimum assist (75% patient effort)  -KJ     Comment, (Bed Mobility) assistance with LLE  -KJ     Row Name 12/22/22 0933          Sit-Stand Transfer    Sit-Stand [place] (Transfers) contact guard  -KJ     Assistive Device (Sit-Stand Transfers) walker, front-wheeled  -KJ     Row Name 12/22/22 0933          Stand-Sit Transfer    Stand-Sit [place] (Transfers) supervision  -KJ     Assistive Device (Stand-Sit Transfers) walker, front-wheeled  -KJ     Row Name 12/22/22 0933          Gait/Stairs (Locomotion)    [place] Level (Gait) verbal cues;contact guard  -KJ     Assistive Device (Gait) walker, front-wheeled  -KJ     Distance in Feet (Gait) 175  -KJ     Comment, (Gait/Stairs) no buckling or loss of balance  -KJ     Row Name 12/22/22 0933          Motor Skills    Therapeutic Exercise aerobic  -KJ     Row Name 12/22/22 0933          Aerobic Exercise    Comment, Aerobic Exercise (Therapeutic Exercise) LAQ's  and heel slides  -KJ     Row Name             Wound 12/19/22 1109 Left lower abdomen Incision    Wound - Properties Group Placement Date: 12/19/22  -DT Placement Time: 1109  -DT Present on Hospital Admission: N  -DT Side: Left  -DT Orientation: lower  -DT Location: abdomen  -DT Primary Wound Type: Incision  -DT    Retired Wound - Properties Group Placement Date: 12/19/22  -DT Placement Time: 1109  -DT Present on Hospital Admission: N  -DT Side: Left  -DT Orientation: lower  -DT Location: abdomen  -DT Primary Wound Type: Incision  -DT    Retired Wound  - Properties Group Date first assessed: 12/19/22  -DT Time first assessed: 1109  -DT Present on Hospital Admission: N  -DT Side: Left  -DT Location: abdomen  -DT Primary Wound Type: Incision  -DT    Row Name             Wound 12/21/22 0924 Left flank Incision    Wound - Properties Group Placement Date: 12/21/22  -DT Placement Time: 0924  -DT Present on Hospital Admission: N  -DT Side: Left  -DT Location: flank  -DT Primary Wound Type: Incision  -DT    Retired Wound - Properties Group Placement Date: 12/21/22  -DT Placement Time: 0924  -DT Present on Hospital Admission: N  -DT Side: Left  -DT Location: flank  -DT Primary Wound Type: Incision  -DT    Retired Wound - Properties Group Date first assessed: 12/21/22  -DT Time first assessed: 0924  -DT Present on Hospital Admission: N  -DT Side: Left  -DT Location: flank  -DT Primary Wound Type: Incision  -DT    Row Name 12/22/22 0933          Positioning and Restraints    Pre-Treatment Position sitting in chair/recliner  -KJ     Post Treatment Position bed  -KJ     In Bed call light within reach  -KJ           User Key  (r) = Recorded By, (t) = Taken By, (c) = Cosigned By    Initials Name Provider Type    Wendy Dawson PTA Physical Therapist Assistant    Master Coronado RN Registered Nurse                Physical Therapy Education     Title: PT OT SLP Therapies (In Progress)     Topic: Physical Therapy (In Progress)     Point: Mobility training (Done)     Learning Progress Summary           Patient Acceptance, E, VU by MS at 12/21/2022 1557    Comment: role of PT in his care, spinal restrictions    Acceptance, E, NR by DOUG at 12/19/2022 1449    Comment: Benefits of activity, progression of PT, back precautions, LSO use/radha/doff                   Point: Home exercise program (Not Started)     Learner Progress:  Not documented in this visit.          Point: Body mechanics (Not Started)     Learner Progress:  Not documented in this visit.          Point:  Precautions (Done)     Learning Progress Summary           Patient Acceptance, E, VU by MS at 12/21/2022 6477    Comment: role of PT in his care, spinal restrictions    Acceptance, E, NR by DOUG at 12/19/2022 1444    Comment: Benefits of activity, progression of PT, back precautions, LSO use/radha/doff                               User Key     Initials Effective Dates Name Provider Type Discipline    DOUG 08/02/16 -  Rakesh Cheung, PT DPT Physical Therapist PT    MS 06/19/18 -  Chantelle Willson, PT, DPT, NCS Physical Therapist PT              PT Recommendation and Plan     Plan of Care Reviewed With: patient  Progress: improving  Outcome Evaluation: PT tx completed. Pt continues to demonstrate  LLE weakness. L hip flexion is 2+/5, quad/dorsiflexion, and EHL are till 3/5. No buckling of LLE during gait this am. Rates pn 5/10. Amb with r wx 175'. Benefit cont PT for strengthening.   Outcome Measures     Row Name 12/22/22 1000 12/22/22 0800 12/20/22 1000       How much help from another person do you currently need...    Turning from your back to your side while in flat bed without using bedrails? 3  -KJ -- 4  -KJ    Moving from lying on back to sitting on the side of a flat bed without bedrails? 3  -KJ -- 4  -KJ    Moving to and from a bed to a chair (including a wheelchair)? 3  -KJ -- 4  -KJ    Standing up from a chair using your arms (e.g., wheelchair, bedside chair)? 3  -KJ -- 4  -KJ    Climbing 3-5 steps with a railing? 3  -KJ -- 4  -KJ    To walk in hospital room? 3  -KJ -- 4  -KJ    AM-PAC 6 Clicks Score (PT) 18  -KJ -- 24  -KJ       How much help from another is currently needed...    Putting on and taking off regular lower body clothing? -- 2  -AC --    Bathing (including washing, rinsing, and drying) -- 3  -AC --    Toileting (which includes using toilet bed pan or urinal) -- 3  -AC --    Putting on and taking off regular upper body clothing -- 4  -AC --    Taking care of personal grooming (such as brushing  teeth) -- 4  -AC --    Eating meals -- 4  -AC --    AM-PAC 6 Clicks Score (OT) -- 20  -AC --       Functional Assessment    Outcome Measure Options AM-PAC 6 Clicks Basic Mobility (PT)  -KJ AM-PAC 6 Clicks Daily Activity (OT)  -AC AM-PAC 6 Clicks Basic Mobility (PT)  -KJ          User Key  (r) = Recorded By, (t) = Taken By, (c) = Cosigned By    Initials Name Provider Type    Abilio Xiong, OTR/L, CNT Occupational Therapist    Wendy Dawson PTA Physical Therapist Assistant                 Time Calculation:    PT Charges     Row Name 12/22/22 1010             Time Calculation    Start Time 0933  -KJ      Stop Time 1000  -KJ      Time Calculation (min) 27 min  -KJ      PT Received On 12/22/22  -KJ      PT Goal Re-Cert Due Date 12/31/22  -KJ         Time Calculation- PT    Total Timed Code Minutes- PT 27 minute(s)  -KJ            User Key  (r) = Recorded By, (t) = Taken By, (c) = Cosigned By    Initials Name Provider Type    Wendy Dawson PTA Physical Therapist Assistant              Therapy Charges for Today     Code Description Service Date Service Provider Modifiers Qty    [id number] HC GAIT TRAINING EA 15 MIN 12/22/2022 Wendy Valentin PTA GP 2          PT G-Codes  Outcome Measure Options: AM-PAC 6 Clicks Basic Mobility (PT)  AM-PAC 6 Clicks Score (PT): 18  AM-PAC 6 Clicks Score (OT): 20    Wendy Valentin PTA  12/22/2022

## 2022-12-22 NOTE — PLAN OF CARE
Goal Outcome Evaluation:  Plan of Care Reviewed With: patient        Progress: no change  Outcome Evaluation: OT reeval completed.  Pt alert and oriented x4.  He c/o 6/10 back pain and nausea.  Dry heaving once EOB.  Pt log rolled with SBA and bed rails.  Dons LSO with set up.  MaxA to don socks.  His LLE is very weak and he is unable to flex his hip against gravity.  He reports this is worse since yesterday but is partially due to pain in his hips.  He transferred with CGA and ambulated to chair with CGA and rw.  OT will continue to treat to address the above deficits.  Recommend home with assist pending progress.

## 2022-12-22 NOTE — PLAN OF CARE
Goal Outcome Evaluation:  Plan of Care Reviewed With: patient        Progress: no change  Outcome Evaluation: A & O, VSS, c/o pain to left upper leg, prn pain meds and muscle relaxer given with relief, self turning, up with assistance and LSO brace, voiding, dressings to lower abdomen and left flank dry and intact, bed alarm set, safety maintained

## 2022-12-22 NOTE — PROGRESS NOTES
Manuel Moscoso is a 62 y.o. male patient.      Pain out of control.  No bowel movement since admission.  Patient looks uncomfortable this morning.      Current Facility-Administered Medications   Medication Dose Route Frequency Provider Last Rate Last Admin   • acetaminophen (TYLENOL) tablet 650 mg  650 mg Oral Q4H PRN CHARITO Garcia MD       • atorvastatin (LIPITOR) tablet 20 mg  20 mg Oral Daily CHARITO Garcia MD   20 mg at 12/21/22 1255   • atropine sulfate injection 0.5 mg  0.5 mg Intravenous Once PRN CHARITO Garcia MD       • cloNIDine (CATAPRES) tablet 0.1 mg  0.1 mg Oral TID CHARITO Garcia MD   0.1 mg at 12/21/22 2156   • cyclobenzaprine (FLEXERIL) tablet 10 mg  10 mg Oral TID PRN CHARITO Garcia MD   10 mg at 12/22/22 0427   • dextrose (D50W) (25 g/50 mL) IV injection 25 g  25 g Intravenous Q15 Min PRN CHARITO Garcia MD       • dextrose (GLUTOSE) oral gel 15 g  15 g Oral Q15 Min PRN CHARITO Garcia MD       • fentaNYL citrate (PF) (SUBLIMAZE) injection 25 mcg  25 mcg Intravenous Q5 Min PRN CHARITO Garcia MD       • flumazenil (ROMAZICON) injection 0.2 mg  0.2 mg Intravenous PRN CHARITO Garcia MD       • gabapentin (NEURONTIN) capsule 300 mg  300 mg Oral TID CHARITO Garcia MD   300 mg at 12/21/22 2318   • glucagon (human recombinant) (GLUCAGEN DIAGNOSTIC) injection 1 mg  1 mg Intramuscular Q15 Min PRN CHARITO Garcia MD       • hydroCHLOROthiazide (HYDRODIURIL) tablet 12.5 mg  12.5 mg Oral Daily CHARITO Garcia MD   12.5 mg at 12/21/22 1258   • HYDROmorphone (DILAUDID) injection 0.5 mg  0.5 mg Intravenous Q10 Min PRN CHARITO Garcia MD   0.5 mg at 12/21/22 1202   • HYDROmorphone (DILAUDID) injection 1 mg  1 mg Intravenous Q3H PRN CHARITO Garcia MD        And   • naloxone (NARCAN) injection 0.4 mg  0.4 mg Intravenous Q5 Min PRN CHARITO Garcia MD       • Insulin Lispro (humaLOG) injection 2-7 Units  2-7 Units Subcutaneous TID AC CHARITO Garcia  MD Brayden   2 Units at 12/21/22 1652   • labetalol (NORMODYNE,TRANDATE) injection 5 mg  5 mg Intravenous Q5 Min PRN CHARITO Garcia MD       • lactated ringers infusion 1,000 mL  1,000 mL Intravenous Continuous CHARITO Garcia MD   Stopped at 12/21/22 1249   • lactated ringers infusion  100 mL/hr Intravenous Continuous CHARITO Garcia MD   Stopped at 12/21/22 1249   • losartan (COZAAR) tablet 25 mg  25 mg Oral Daily CHARITO Garcia MD   25 mg at 12/21/22 1257   • metFORMIN (GLUCOPHAGE) tablet 1,000 mg  1,000 mg Oral BID With Meals CHARITO Garcia MD   1,000 mg at 12/21/22 1701   • naloxone (NARCAN) injection 0.04 mg  0.04 mg Intravenous PRN CHARITO Garcia MD       • ondansetron (ZOFRAN) tablet 4 mg  4 mg Oral Q6H PRN CHARITO Garcia MD        Or   • ondansetron (ZOFRAN) injection 4 mg  4 mg Intravenous Q6H PRN CHARITO Garcia MD       • ondansetron (ZOFRAN) injection 4 mg  4 mg Intravenous Q15 Min PRN CHARITO Garcia MD       • oxyCODONE-acetaminophen (PERCOCET)  MG per tablet 1 tablet  1 tablet Oral Once PRN CHARITO Garcia MD       • oxyCODONE-acetaminophen (PERCOCET) 7.5-325 MG per tablet 1 tablet  1 tablet Oral Q4H PRN CHARITO Garcia MD   1 tablet at 12/21/22 2156   • oxyCODONE-acetaminophen (PERCOCET) 7.5-325 MG per tablet 2 tablet  2 tablet Oral Q4H PRN CHARITO Garcia MD   2 tablet at 12/22/22 0423   • polyethylene glycol (MIRALAX) packet 17 g  17 g Oral Daily PRN CHARITO Garcia MD       • sennosides-docusate (PERICOLACE) 8.6-50 MG per tablet 1 tablet  1 tablet Oral BID CHARITO Garcia MD   1 tablet at 12/21/22 2156   • sodium chloride 0.9 % flush 10 mL  10 mL Intravenous PRN CHARITO Garcia MD   10 mL at 12/19/22 2013   • sodium chloride 0.9 % flush 3 mL  3 mL Intravenous Q12H CHARITO Garcia MD   3 mL at 12/21/22 2156   • sodium chloride 0.9 % infusion 40 mL  40 mL Intravenous PRN CHARITO Garcia MD       • sodium chloride 0.9 %  infusion  100 mL/hr Intravenous Continuous CHARITO Garcia  mL/hr at 12/21/22 2320 100 mL/hr at 12/21/22 2320     ALLERGIES:    Allergies   Allergen Reactions   • Alpha-Gal Nausea And Vomiting and GI Intolerance     Red meat allergy   • Penicillins Other (See Comments)     STATES HE \"HAS NO IDEA\" AND THAT IT WAS A CHILDHOOD ALLERGY       Lumbago of multiple sites in spine with sciatica    Lumbosacral disc disease    Chronic bilateral low back pain without sciatica    Type 2 diabetes mellitus with hyperglycemia, without long-term current use of insulin ([facility])    Essential hypertension    Drug-induced constipation    Blood pressure 153/68, pulse 85, temperature 98.1 °F (36.7 °C), temperature source Oral, resp. rate 18, height 175.3 cm (69\"), weight 101 kg (223 lb 9.6 oz), SpO2 100 %.      Subjective:  Symptoms:  Stable.    Diet:  Poor intake.    Activity level: Impaired due to pain.    Pain:  He complains of pain that is severe.  He reports pain is unchanged.  Pain is partially controlled.      Review of Systems  Objective:  General Appearance:  Comfortable, well-appearing and in distress (From pain).    Vital signs: (most recent): Blood pressure 153/68, pulse 85, temperature 98.1 °F (36.7 °C), temperature source Oral, resp. rate 18, height 175.3 cm (69\"), weight 101 kg (223 lb 9.6 oz), SpO2 100 %.  Vital signs are normal.    Output: Producing urine and minimal stool output.    HEENT: Normal HEENT exam.    Lungs:  Normal effort and normal respiratory rate.    Heart: Normal rate.  Regular rhythm.    Abdomen: Abdomen is soft.  Hypoactive bowel sounds.   There is generalized tenderness.     Extremities: Decreased range of motion.    Skin:  Warm and dry.              Labs:  Lab Results (last 72 hours)     Procedure Component Value Units Date/Time    POC Glucose Once [[id number]]  (Abnormal) Collected: 12/22/22 0719    Specimen: Blood Updated: 12/22/22 0812     Glucose 157 mg/dL      Comment: : [id number]  Dakota Aguilar ID: [id number]       POC Glucose Once [[id number]]  (Abnormal) Collected: 12/21/22 1650    Specimen: Blood Updated: 12/21/22 1701     Glucose 153 mg/dL      Comment: : [id number] Brenden RowlandMeter ID: [id number]       POC Glucose Once [[id number]]  (Abnormal) Collected: 12/21/22 1102    Specimen: Blood Updated: 12/21/22 1113     Glucose 136 mg/dL      Comment: : [id number] Idania (Bone) ShannonMeter ID: [id number]       POC Glucose Once [[id number]]  (Normal) Collected: 12/21/22 0753    Specimen: Blood Updated: 12/21/22 0816     Glucose 122 mg/dL      Comment: : [id number] Brenden KashifMeter ID: [id number]       POC Glucose Once [[id number]]  (Abnormal) Collected: 12/20/22 2005    Specimen: Blood Updated: 12/20/22 2016     Glucose 133 mg/dL      Comment: : [id number] Renetta ArnaudMeter ID: [id number]       POC Glucose Once [[id number]]  (Abnormal) Collected: 12/20/22 1701    Specimen: Blood Updated: 12/20/22 1713     Glucose 169 mg/dL      Comment: : [id number] Miranda Gross ID: [id number]       POC Glucose Once [[id number]]  (Abnormal) Collected: 12/20/22 1208    Specimen: Blood Updated: 12/20/22 1219     Glucose 330 mg/dL      Comment: : [id number] Miranda Gross ID: [id number]       Basic Metabolic Panel [[id number]]  (Abnormal) Collected: 12/20/22 0719    Specimen: Blood Updated: 12/20/22 0757     Glucose 146 mg/dL      BUN 17 mg/dL      Creatinine 0.96 mg/dL      Sodium 141 mmol/L      Potassium 4.1 mmol/L      Chloride 102 mmol/L      CO2 28.0 mmol/L      Calcium 9.2 mg/dL      BUN/Creatinine Ratio 17.7     Anion Gap 11.0 mmol/L      eGFR 89.9 mL/min/1.73      Comment: National Kidney Foundation and American Society of Nephrology (ASN) Task Force recommended calculation based on the Chronic Kidney Disease Epidemiology Collaboration (CKD-EPI) equation refit without adjustment for race.       Narrative:      GFR Normal >60  Chronic Kidney Disease <60  Kidney Failure <15       CBC & Differential [[id number]]  (Abnormal) Collected: 12/20/22 0719    Specimen: Blood Updated: 12/20/22 0734    Narrative:      The following orders were created for panel order CBC & Differential.  Procedure                               Abnormality         Status                     ---------                               -----------         ------                     CBC Auto Differential[[id number]]        Abnormal            Final result                 Please view results for these tests on the individual orders.    CBC Auto Differential [[id number]]  (Abnormal) Collected: 12/20/22 0719    Specimen: Blood Updated: 12/20/22 0734     WBC 11.12 10*3/mm3      RBC 4.43 10*6/mm3      Hemoglobin 13.9 g/dL      Hematocrit 41.1 %      MCV 92.8 fL      MCH 31.4 pg      MCHC 33.8 g/dL      RDW 11.9 %      RDW-SD 40.8 fl      MPV 11.0 fL      Platelets 222 10*3/mm3      Neutrophil % 76.6 %      Lymphocyte % 13.6 %      Monocyte % 7.8 %      Eosinophil % 1.3 %      Basophil % 0.3 %      Immature Grans % 0.4 %      Neutrophils, Absolute 8.53 10*3/mm3      Lymphocytes, Absolute 1.51 10*3/mm3      Monocytes, Absolute 0.87 10*3/mm3      Eosinophils, Absolute 0.14 10*3/mm3      Basophils, Absolute 0.03 10*3/mm3      Immature Grans, Absolute 0.04 10*3/mm3      nRBC 0.0 /100 WBC     POC Glucose Once [[id number]]  (Abnormal) Collected: 12/19/22 1304    Specimen: Blood Updated: 12/19/22 1315     Glucose 139 mg/dL      Comment: : [id number] Premaanahy DunlapFarzaneh ID: [id number]       POC Glucose Once [[id number]]  (Normal) Collected: 12/19/22 0851    Specimen: Blood Updated: 12/19/22 0902     Glucose 106 mg/dL      Comment: : [id number] Florence Cross ID: [id number]             Imaging Results (Last 72 Hours)     Procedure Component Value Units Date/Time    XR Spine Lumbar AP & Lateral [[id number]] Collected: 12/21/22 1053     Updated: 12/21/22 1057    Narrative:      XR SPINE LUMBAR AP AND LATERAL- 12/21/2022 9:14 AM CST      HISTORY: fusion; M54.50-Low back pain, unspecified; G89.29-Other chronic  pain; Z74.09-Other reduced mobility     COMPARISON: None     FLUOROSCOPY TIME: 59 seconds     FLUOROSCOPY DOSE: 51.297 mGy     NUMBER OF IMAGES: 4       Impression:         Intraoperative fluoroscopic images during lateral fusion at the L4/L5  level.     Please refer to the operative note for more details.  This report was finalized on 12/21/2022 10:54 by Dr. Steffi Fraser MD.    FL C Arm During Surgery [[id number]] Resulted: 12/21/22 1045     Updated: 12/21/22 1045    Narrative:      This procedure was auto-finalized with no dictation required.    XR Abdomen KUB [[id number]] Collected: 12/19/22 1316     Updated: 12/19/22 1320    Narrative:      EXAM/TECHNIQUE: XR ABDOMEN KUB-     INDICATION: no count in or     COMPARISON: None available.     FINDINGS:     Postoperative change of L5-S1 anterior fusion without evidence of  complication. No unexpected radiopaque foreign body. LEFT L4-L5 hardware  is also present. Multilevel lumbar spine degenerative change.  Nonobstructive bowel gas pattern.       Impression:         No unexpected radiopaque foreign body.  This report was finalized on 12/19/2022 13:17 by Dr. Demetris Lomas MD.    XR Spine Lumbar AP & Lateral [[id number]] Collected: 12/19/22 1316     Updated: 12/19/22 1319    Narrative:      XR SPINE LUMBAR AP AND LATERAL- 12/19/2022 1:00 PM CST     HISTORY: alif     COMPARISON: None     FLUOROSCOPY TIME: 11.2 seconds     FLUOROSCOPY DOSE: 10.0 mGy     NUMBER OF IMAGES: 7       Impression:         Intraoperative fluoroscopic images during lumbar fusion.     Please refer to the operative note for more details.   This report was finalized on 12/19/2022 13:16 by Dr. Demetrsi Lomas MD.    FL C Arm During Surgery [[id number]] Resulted: 12/19/22 1305     Updated: 12/19/22 1305    Narrative:      This procedure was auto-finalized with no dictation required.                Assessment:    Condition: In  stable condition.  Improving.       Plan:   Discharge home.  (    Obstipation/constipation-stimulate with Dulcolax suppository and/or milk of mag this morning.  Limited oral intake.  Continue with MiraLAX.    For pain control- will need pain medications prior to getting up with physical therapy today.    Type 2 diabetes-blood sugar stable.    Patient is stable for discharge home if pain gets under better control.  Follow-up with his primary care doctor 1-2 weeks for blood pressure.  Suspect his systolic blood pressure is up today secondary to poor pain control.    Explained to patient and staff that we were consulted for medical management during their acute care hospitalization. The Medical Consultation was requested by the Attending Physician. The patient has been recommended to f/u with their regular primary care provider concerning any further treatment and review of abnormalities found during their hospitalization at .They agree with the treatment plan as well as understand our role in their hospitalization. All questions were encouraged and answered to best of my ability.).     Problem List:     Lumbago of multiple sites in spine with sciatica    Lumbosacral disc disease    Chronic bilateral low back pain without sciatica    Type 2 diabetes mellitus with hyperglycemia, without long-term current use of insulin (HCC)    Essential hypertension    Drug-induced constipation    Adolph Chao MD  12/22/2022

## 2022-12-22 NOTE — THERAPY TREATMENT NOTE
Acute Care - Physical Therapy Treatment Note  [facility]     Patient Name: Manuel Moscoso  : 1960  MRN: [id number]  Today's Date: 2022   Onset of Illness/Injury or Date of Surgery: 22  Visit Dx:     ICD-10-CM ICD-9-CM   1. Chronic bilateral low back pain without sciatica  M54.50 724.2    G89.29 338.29   2. Impaired mobility  Z74.09 799.89   3. Decreased activities of daily living (ADL)  Z78.9 V49.89     Patient Active Problem List   Diagnosis   • Lumbosacral disc disease   • Chronic bilateral low back pain without sciatica   • Lumbago of multiple sites in spine with sciatica   • Type 2 diabetes mellitus with hyperglycemia, without long-term current use of insulin (HCC)   • Essential hypertension   • Drug-induced constipation     Past Medical History:   Diagnosis Date   • Allergy to alpha-gal    • Arthritis    • Chronic bilateral low back pain without sciatica 2022   • Diabetes mellitus (HCC)    • Hearing loss, left    • History of staph infection    • Hypertension    • Low back pain    • Lumbosacral disc disease 2022   • Psoriasis    • Sleep apnea    • Tinnitus    • Vision disturbance     using rx eyedrops     Past Surgical History:   Procedure Laterality Date   • BACK SURGERY      x 2   • CHOLECYSTECTOMY     • COLONOSCOPY     • GANGLION CYST EXCISION Left     wrist   • LIPOMA EXCISION      back   • LUMBAR FUSION Left 2022    Procedure: LEFT LATERAL LUMBAR INTERBODY FUSION WITH INSTRUMENTATION L4-5;  Surgeon: CHARITO Garcia MD;  Location: [facility];  Service: Orthopedic Spine;  Laterality: Left;     PT Assessment (last 12 hours)     PT Evaluation and Treatment     Row Name 22 1250 22 0933       Physical Therapy Time and Intention    Subjective Information complains of;pain  -JAMAL complains of;pain  -KJ    Document Type therapy note (daily note)  -JAMAL therapy note (daily note)  -KJ    Mode of Treatment physical therapy  -JAMAL physical therapy  -KJ    Patient  Effort -- good  -KJ    Row Name 12/22/22 1250 12/22/22 0933       General Information    Existing Precautions/Restrictions brace worn when out of bed;fall;LSO  -JAMAL brace worn when out of bed;fall;spinal;LSO  -KJ    Row Name 12/22/22 1250 12/22/22 0933       Pain    Pretreatment Pain Rating 7/10  -JAMAL 5/10  -KJ    Posttreatment Pain Rating 7/10  -JAMAL 5/10  -KJ    Pain Location - Side/Orientation Left  -JAMAL Left  -KJ    Pain Location lower  -JAMAL lower  -KJ    Pain Location - back;extremity  -JAMAL back  -KJ    Pre/Posttreatment Pain Comment -- radiating down LLE groin>thigh  -KJ    Pain Intervention(s) Medication (See MAR);Repositioned  -JAMAL --    Row Name 12/22/22 1250 12/22/22 0933       Bed Mobility    Bed Mobility sit-sidelying  -JAMAL --    Sidelying-Sit [place] (Bed Mobility) verbal cues;minimum assist (75% patient effort)  -JAMAL minimum assist (75% patient effort)  -KJ    Sit-Sidelying [place] (Bed Mobility) verbal cues;minimum assist (75% patient effort)  -JAMAL --    Comment, (Bed Mobility) -- assistance with LLE  -KJ    Row Name 12/22/22 1250          Transfers    Transfers toilet transfer  -JAMAL     Row Name 12/22/22 1250 12/22/22 0933       Sit-Stand Transfer    Sit-Stand [place] (Transfers) verbal cues;contact guard  -JAMAL contact guard  -KJ    Assistive Device (Sit-Stand Transfers) walker, front-wheeled  -JAMAL walker, front-wheeled  -KJ    Row Name 12/22/22 1250 12/22/22 0933       Stand-Sit Transfer    Stand-Sit [place] (Transfers) verbal cues;contact guard  -JAMAL supervision  -KJ    Assistive Device (Stand-Sit Transfers) walker, front-wheeled  -JAMAL walker, front-wheeled  -KJ    Row Name 12/22/22 1250          Toilet Transfer    [place] Level (Toilet Transfer) verbal cues;minimum assist (75% patient effort)  -JAMAL     Assistive Device (Toilet Transfer) grab bars/safety frame;walker, front-wheeled  -JAMAL     Comment, (Toilet Transfer) pt stood over the commode, had one LOB, but able to self correct  -JAMAL      Row Name 12/22/22 1250 12/22/22 0933       Gait/Stairs (Locomotion)    [place] Level (Gait) verbal cues;contact guard  -JAMAL verbal cues;contact guard  -KJ    Assistive Device (Gait) walker, front-wheeled  -JAMAL walker, front-wheeled  -KJ    Distance in Feet (Gait) 30' X 3, pt had to take several standing rests due to increase pain.  -JAMAL 175  -KJ    Deviations/Abnormal Patterns (Gait) gait speed decreased  -JAMAL --    Bilateral Gait Deviations forward flexed posture  -JAMAL --    Comment, (Gait/Stairs) pt c/o worse pain when advancing LLE  -JAMAL no buckling or loss of balance  -KJ    Row Name 12/22/22 0933          Motor Skills    Therapeutic Exercise aerobic  -KJ     Row Name 12/22/22 0933          Aerobic Exercise    Comment, Aerobic Exercise (Therapeutic Exercise) LAQ's  and heel slides  -KJ     Row Name             Wound 12/19/22 1109 Left lower abdomen Incision    Wound - Properties Group Placement Date: 12/19/22  -DT Placement Time: 1109  -DT Present on Hospital Admission: N  -DT Side: Left  -DT Orientation: lower  -DT Location: abdomen  -DT Primary Wound Type: Incision  -DT    Retired Wound - Properties Group Placement Date: 12/19/22  -DT Placement Time: 1109  -DT Present on Hospital Admission: N  -DT Side: Left  -DT Orientation: lower  -DT Location: abdomen  -DT Primary Wound Type: Incision  -DT    Retired Wound - Properties Group Date first assessed: 12/19/22  -DT Time first assessed: 1109  -DT Present on Hospital Admission: N  -DT Side: Left  -DT Location: abdomen  -DT Primary Wound Type: Incision  -DT    Row Name             Wound 12/21/22 0924 Left flank Incision    Wound - Properties Group Placement Date: 12/21/22  -DT Placement Time: 0924  -DT Present on Hospital Admission: N  -DT Side: Left  -DT Location: flank  -DT Primary Wound Type: Incision  -DT    Retired Wound - Properties Group Placement Date: 12/21/22  -DT Placement Time: 0924  -DT Present on Hospital Admission: N  -DT Side: Left  -DT Location:  flank  -DT Primary Wound Type: Incision  -DT    Retired Wound - Properties Group Date first assessed: 12/21/22  -DT Time first assessed: 0924  -DT Present on Hospital Admission: N  -DT Side: Left  -DT Location: flank  -DT Primary Wound Type: Incision  -DT    Row Name 12/22/22 1250 12/22/22 0933       Positioning and Restraints    Pre-Treatment Position in bed  -JAMAL sitting in chair/recliner  -KJ    Post Treatment Position bed  -JAMAL bed  -KJ    In Bed side lying right;call light within reach;encouraged to call for assist;side rails up x2  -JAMAL call light within reach  -KJ          User Key  (r) = Recorded By, (t) = Taken By, (c) = Cosigned By    Initials Name Provider Type    Wendy Dawson, PTA Physical Therapist Assistant    Anthony Ludwig PTA Physical Therapist Assistant    Master Coronado, RN Registered Nurse                Physical Therapy Education     Title: PT OT SLP Therapies (In Progress)     Topic: Physical Therapy (In Progress)     Point: Mobility training (Done)     Learning Progress Summary           Patient Acceptance, E, VU by MS at 12/21/2022 1557    Comment: role of PT in his care, spinal restrictions    Acceptance, E, NR by DOUG at 12/19/2022 1449    Comment: Benefits of activity, progression of PT, back precautions, LSO use/radha/doff                   Point: Home exercise program (Not Started)     Learner Progress:  Not documented in this visit.          Point: Body mechanics (Not Started)     Learner Progress:  Not documented in this visit.          Point: Precautions (Done)     Learning Progress Summary           Patient Acceptance, E, VU by MS at 12/21/2022 1557    Comment: role of PT in his care, spinal restrictions    Acceptance, E, NR by DOUG at 12/19/2022 1449    Comment: Benefits of activity, progression of PT, back precautions, LSO use/radha/doff                               User Key     Initials Effective Dates Name Provider Type Discipline    DOGU 08/02/16 -  Rakesh Cheung  PT DPT Physical Therapist PT    MS 06/19/18 -  Chantelle Willson, PT, DPT, NCS Physical Therapist PT              PT Recommendation and Plan         Outcome Measures     Row Name 12/22/22 1000 12/22/22 0800 12/20/22 1000       How much help from another person do you currently need...    Turning from your back to your side while in flat bed without using bedrails? 3  -KJ -- 4  -KJ    Moving from lying on back to sitting on the side of a flat bed without bedrails? 3  -KJ -- 4  -KJ    Moving to and from a bed to a chair (including a wheelchair)? 3  -KJ -- 4  -KJ    Standing up from a chair using your arms (e.g., wheelchair, bedside chair)? 3  -KJ -- 4  -KJ    Climbing 3-5 steps with a railing? 3  -KJ -- 4  -KJ    To walk in hospital room? 3  -KJ -- 4  -KJ    AM-PAC 6 Clicks Score (PT) 18  -KJ -- 24  -KJ       How much help from another is currently needed...    Putting on and taking off regular lower body clothing? -- 2  -AC --    Bathing (including washing, rinsing, and drying) -- 3  -AC --    Toileting (which includes using toilet bed pan or urinal) -- 3  -AC --    Putting on and taking off regular upper body clothing -- 4  -AC --    Taking care of personal grooming (such as brushing teeth) -- 4  -AC --    Eating meals -- 4  -AC --    AM-PAC 6 Clicks Score (OT) -- 20  -AC --       Functional Assessment    Outcome Measure Options AM-PAC 6 Clicks Basic Mobility (PT)  -KJ AM-PAC 6 Clicks Daily Activity (OT)  -AC AM-PAC 6 Clicks Basic Mobility (PT)  -KJ          User Key  (r) = Recorded By, (t) = Taken By, (c) = Cosigned By    Initials Name Provider Type    Abilio Xiong, OTR/L, CNT Occupational Therapist    Wendy Dawson, PTA Physical Therapist Assistant                 Time Calculation:    PT Charges     Row Name 12/22/22 1250 12/22/22 1010          Time Calculation    Start Time 1250  -JAMAL 0933  -KJ     Stop Time 1314  -JAMAL 1000  -KJ     Time Calculation (min) 24 min  -JAMAL 27 min  -KJ     PT Received On  12/22/22  -JAMAL 12/22/22  -KJ     PT Goal Re-Cert Due Date -- 12/31/22  -KJ        Time Calculation- PT    Total Timed Code Minutes- PT 24 minute(s)  -JAMAL 27 minute(s)  -KJ        Timed Charges    88637 - Gait Training Minutes  24  -JAMAL --        Total Minutes    Timed Charges Total Minutes 24  -JAMAL --      Total Minutes 24  -JAMAL --           User Key  (r) = Recorded By, (t) = Taken By, (c) = Cosigned By    Initials Name Provider Type    Wendy Dawson PTA Physical Therapist Assistant    Anthony Ludwig PTA Physical Therapist Assistant              Therapy Charges for Today     Code Description Service Date Service Provider Modifiers Qty    [id number] HC GAIT TRAINING EA 15 MIN 12/22/2022 Anthony Ramirez PTA GP 2          PT G-Codes  Outcome Measure Options: AM-PAC 6 Clicks Basic Mobility (PT)  AM-PAC 6 Clicks Score (PT): 18  AM-PAC 6 Clicks Score (OT): 20    Anthony Ramirez PTA  12/22/2022

## 2022-12-23 ENCOUNTER — ANESTHESIA (OUTPATIENT)
Dept: PERIOP | Facility: HOSPITAL | Age: 62
DRG: 455 | End: 2022-12-23
Payer: COMMERCIAL

## 2022-12-23 ENCOUNTER — APPOINTMENT (OUTPATIENT)
Dept: GENERAL RADIOLOGY | Facility: HOSPITAL | Age: 62
DRG: 455 | End: 2022-12-23
Payer: COMMERCIAL

## 2022-12-23 LAB
GLUCOSE BLDC GLUCOMTR-MCNC: 119 MG/DL (ref 70–130)
GLUCOSE BLDC GLUCOMTR-MCNC: 136 MG/DL (ref 70–130)
GLUCOSE BLDC GLUCOMTR-MCNC: 141 MG/DL (ref 70–130)
GLUCOSE BLDC GLUCOMTR-MCNC: 142 MG/DL (ref 70–130)

## 2022-12-23 PROCEDURE — C9290 INJ, BUPIVACAINE LIPOSOME: HCPCS | Performed by: ORTHOPAEDIC SURGERY

## 2022-12-23 PROCEDURE — C1769 GUIDE WIRE: HCPCS | Performed by: ORTHOPAEDIC SURGERY

## 2022-12-23 PROCEDURE — C1713 ANCHOR/SCREW BN/BN,TIS/BN: HCPCS | Performed by: ORTHOPAEDIC SURGERY

## 2022-12-23 PROCEDURE — 25010000002 HYDROMORPHONE PER 4 MG: Performed by: ANESTHESIOLOGY

## 2022-12-23 PROCEDURE — 25010000002 FENTANYL CITRATE (PF) 50 MCG/ML SOLUTION: Performed by: ANESTHESIOLOGY

## 2022-12-23 PROCEDURE — 97535 SELF CARE MNGMENT TRAINING: CPT | Performed by: OCCUPATIONAL THERAPIST

## 2022-12-23 PROCEDURE — 76000 FLUOROSCOPY <1 HR PHYS/QHP: CPT

## 2022-12-23 PROCEDURE — 0 BUPIVACAINE LIPOSOME 1.3 % SUSPENSION 20 ML VIAL: Performed by: ORTHOPAEDIC SURGERY

## 2022-12-23 PROCEDURE — 97168 OT RE-EVAL EST PLAN CARE: CPT | Performed by: OCCUPATIONAL THERAPIST

## 2022-12-23 PROCEDURE — 72100 X-RAY EXAM L-S SPINE 2/3 VWS: CPT

## 2022-12-23 PROCEDURE — 82962 GLUCOSE BLOOD TEST: CPT

## 2022-12-23 PROCEDURE — 0SG30K1 FUSION OF LUMBOSACRAL JOINT WITH NONAUTOLOGOUS TISSUE SUBSTITUTE, POSTERIOR APPROACH, POSTERIOR COLUMN, OPEN APPROACH: ICD-10-PCS | Performed by: ORTHOPAEDIC SURGERY

## 2022-12-23 PROCEDURE — 25010000002 ONDANSETRON PER 1 MG: Performed by: NURSE ANESTHETIST, CERTIFIED REGISTERED

## 2022-12-23 PROCEDURE — 0SG00K1 FUSION OF LUMBAR VERTEBRAL JOINT WITH NONAUTOLOGOUS TISSUE SUBSTITUTE, POSTERIOR APPROACH, POSTERIOR COLUMN, OPEN APPROACH: ICD-10-PCS | Performed by: ORTHOPAEDIC SURGERY

## 2022-12-23 PROCEDURE — 25010000002 FENTANYL CITRATE (PF) 100 MCG/2ML SOLUTION: Performed by: NURSE ANESTHETIST, CERTIFIED REGISTERED

## 2022-12-23 PROCEDURE — 25010000002 PROPOFOL 10 MG/ML EMULSION: Performed by: NURSE ANESTHETIST, CERTIFIED REGISTERED

## 2022-12-23 PROCEDURE — 0 HYDROMORPHONE 1 MG/ML SOLUTION: Performed by: ORTHOPAEDIC SURGERY

## 2022-12-23 PROCEDURE — 97164 PT RE-EVAL EST PLAN CARE: CPT

## 2022-12-23 DEVICE — CANNULATED EXTENDED TAB POLYAXIAL REDUCTION SCREW, 6.5 MM X 50 MM
Type: IMPLANTABLE DEVICE | Site: SPINE LUMBAR | Status: FUNCTIONAL
Brand: INVICTUS

## 2022-12-23 DEVICE — CANNULATED EXTENDED TAB POLYAXIAL REDUCTION SCREW, 6.5 MM X 45 MM
Type: IMPLANTABLE DEVICE | Site: SPINE LUMBAR | Status: FUNCTIONAL
Brand: INVICTUS

## 2022-12-23 DEVICE — TI, MIS LORDOTIC ROD, VI, 5.5MM X 55MM
Type: IMPLANTABLE DEVICE | Site: SPINE LUMBAR | Status: FUNCTIONAL
Brand: INVICTUS

## 2022-12-23 DEVICE — SET SCREW
Type: IMPLANTABLE DEVICE | Site: SPINE LUMBAR | Status: FUNCTIONAL
Brand: INVICTUS

## 2022-12-23 RX ORDER — ACETAMINOPHEN 500 MG
1000 TABLET ORAL ONCE
Status: DISCONTINUED | OUTPATIENT
Start: 2022-12-23 | End: 2022-12-23 | Stop reason: HOSPADM

## 2022-12-23 RX ORDER — ROCURONIUM BROMIDE 10 MG/ML
INJECTION, SOLUTION INTRAVENOUS AS NEEDED
Status: DISCONTINUED | OUTPATIENT
Start: 2022-12-23 | End: 2022-12-23 | Stop reason: SURG

## 2022-12-23 RX ORDER — SODIUM CHLORIDE 0.9 % (FLUSH) 0.9 %
10 SYRINGE (ML) INJECTION AS NEEDED
Status: DISCONTINUED | OUTPATIENT
Start: 2022-12-23 | End: 2022-12-23 | Stop reason: HOSPADM

## 2022-12-23 RX ORDER — DROPERIDOL 2.5 MG/ML
0.62 INJECTION, SOLUTION INTRAMUSCULAR; INTRAVENOUS ONCE AS NEEDED
Status: DISCONTINUED | OUTPATIENT
Start: 2022-12-23 | End: 2022-12-23 | Stop reason: HOSPADM

## 2022-12-23 RX ORDER — OXYCODONE AND ACETAMINOPHEN 10; 325 MG/1; MG/1
1 TABLET ORAL ONCE AS NEEDED
Status: COMPLETED | OUTPATIENT
Start: 2022-12-23 | End: 2022-12-23

## 2022-12-23 RX ORDER — LIDOCAINE HYDROCHLORIDE 20 MG/ML
INJECTION, SOLUTION EPIDURAL; INFILTRATION; INTRACAUDAL; PERINEURAL AS NEEDED
Status: DISCONTINUED | OUTPATIENT
Start: 2022-12-23 | End: 2022-12-23 | Stop reason: SURG

## 2022-12-23 RX ORDER — SODIUM CHLORIDE 9 MG/ML
40 INJECTION, SOLUTION INTRAVENOUS AS NEEDED
Status: DISCONTINUED | OUTPATIENT
Start: 2022-12-23 | End: 2022-12-23 | Stop reason: HOSPADM

## 2022-12-23 RX ORDER — FENTANYL CITRATE 50 UG/ML
25 INJECTION, SOLUTION INTRAMUSCULAR; INTRAVENOUS
Status: DISCONTINUED | OUTPATIENT
Start: 2022-12-23 | End: 2022-12-23 | Stop reason: HOSPADM

## 2022-12-23 RX ORDER — KETAMINE HCL IN NACL, ISO-OSM 100MG/10ML
SYRINGE (ML) INJECTION AS NEEDED
Status: DISCONTINUED | OUTPATIENT
Start: 2022-12-23 | End: 2022-12-23 | Stop reason: SURG

## 2022-12-23 RX ORDER — FLUMAZENIL 0.1 MG/ML
0.2 INJECTION INTRAVENOUS AS NEEDED
Status: DISCONTINUED | OUTPATIENT
Start: 2022-12-23 | End: 2022-12-23 | Stop reason: HOSPADM

## 2022-12-23 RX ORDER — BUPIVACAINE HCL/0.9 % NACL/PF 0.1 %
2 PLASTIC BAG, INJECTION (ML) EPIDURAL ONCE
Status: DISCONTINUED | OUTPATIENT
Start: 2022-12-23 | End: 2022-12-23 | Stop reason: HOSPADM

## 2022-12-23 RX ORDER — LABETALOL HYDROCHLORIDE 5 MG/ML
5 INJECTION, SOLUTION INTRAVENOUS
Status: DISCONTINUED | OUTPATIENT
Start: 2022-12-23 | End: 2022-12-23 | Stop reason: HOSPADM

## 2022-12-23 RX ORDER — IBUPROFEN 600 MG/1
600 TABLET ORAL ONCE AS NEEDED
Status: DISCONTINUED | OUTPATIENT
Start: 2022-12-23 | End: 2022-12-23 | Stop reason: HOSPADM

## 2022-12-23 RX ORDER — FENTANYL CITRATE 50 UG/ML
INJECTION, SOLUTION INTRAMUSCULAR; INTRAVENOUS AS NEEDED
Status: DISCONTINUED | OUTPATIENT
Start: 2022-12-23 | End: 2022-12-23 | Stop reason: SURG

## 2022-12-23 RX ORDER — SODIUM CHLORIDE 0.9 % (FLUSH) 0.9 %
10 SYRINGE (ML) INJECTION EVERY 12 HOURS SCHEDULED
Status: DISCONTINUED | OUTPATIENT
Start: 2022-12-23 | End: 2022-12-23 | Stop reason: HOSPADM

## 2022-12-23 RX ORDER — HYDROMORPHONE HYDROCHLORIDE 1 MG/ML
0.5 INJECTION, SOLUTION INTRAMUSCULAR; INTRAVENOUS; SUBCUTANEOUS
Status: DISCONTINUED | OUTPATIENT
Start: 2022-12-23 | End: 2022-12-23 | Stop reason: HOSPADM

## 2022-12-23 RX ORDER — LIDOCAINE HYDROCHLORIDE 10 MG/ML
0.5 INJECTION, SOLUTION EPIDURAL; INFILTRATION; INTRACAUDAL; PERINEURAL ONCE AS NEEDED
Status: DISCONTINUED | OUTPATIENT
Start: 2022-12-23 | End: 2022-12-23 | Stop reason: HOSPADM

## 2022-12-23 RX ORDER — ONDANSETRON 2 MG/ML
4 INJECTION INTRAMUSCULAR; INTRAVENOUS
Status: DISCONTINUED | OUTPATIENT
Start: 2022-12-23 | End: 2022-12-23 | Stop reason: HOSPADM

## 2022-12-23 RX ORDER — MAGNESIUM HYDROXIDE 1200 MG/15ML
LIQUID ORAL AS NEEDED
Status: DISCONTINUED | OUTPATIENT
Start: 2022-12-23 | End: 2022-12-23 | Stop reason: HOSPADM

## 2022-12-23 RX ORDER — MIDAZOLAM HYDROCHLORIDE 1 MG/ML
1 INJECTION INTRAMUSCULAR; INTRAVENOUS
Status: DISCONTINUED | OUTPATIENT
Start: 2022-12-23 | End: 2022-12-23 | Stop reason: HOSPADM

## 2022-12-23 RX ORDER — PROPOFOL 10 MG/ML
VIAL (ML) INTRAVENOUS AS NEEDED
Status: DISCONTINUED | OUTPATIENT
Start: 2022-12-23 | End: 2022-12-23 | Stop reason: SURG

## 2022-12-23 RX ORDER — ONDANSETRON 2 MG/ML
INJECTION INTRAMUSCULAR; INTRAVENOUS AS NEEDED
Status: DISCONTINUED | OUTPATIENT
Start: 2022-12-23 | End: 2022-12-23 | Stop reason: SURG

## 2022-12-23 RX ORDER — SODIUM CHLORIDE, SODIUM LACTATE, POTASSIUM CHLORIDE, CALCIUM CHLORIDE 600; 310; 30; 20 MG/100ML; MG/100ML; MG/100ML; MG/100ML
100 INJECTION, SOLUTION INTRAVENOUS CONTINUOUS
Status: DISCONTINUED | OUTPATIENT
Start: 2022-12-23 | End: 2022-12-24 | Stop reason: HOSPADM

## 2022-12-23 RX ORDER — SODIUM CHLORIDE, SODIUM LACTATE, POTASSIUM CHLORIDE, CALCIUM CHLORIDE 600; 310; 30; 20 MG/100ML; MG/100ML; MG/100ML; MG/100ML
100 INJECTION, SOLUTION INTRAVENOUS CONTINUOUS PRN
Status: DISCONTINUED | OUTPATIENT
Start: 2022-12-23 | End: 2022-12-24 | Stop reason: HOSPADM

## 2022-12-23 RX ORDER — BUPIVACAINE HCL/0.9 % NACL/PF 0.125 %
PLASTIC BAG, INJECTION (ML) EPIDURAL AS NEEDED
Status: DISCONTINUED | OUTPATIENT
Start: 2022-12-23 | End: 2022-12-23 | Stop reason: SURG

## 2022-12-23 RX ORDER — NEOSTIGMINE METHYLSULFATE 5 MG/5 ML
SYRINGE (ML) INTRAVENOUS AS NEEDED
Status: DISCONTINUED | OUTPATIENT
Start: 2022-12-23 | End: 2022-12-23 | Stop reason: SURG

## 2022-12-23 RX ORDER — OXYCODONE HCL 20 MG/1
20 TABLET, FILM COATED, EXTENDED RELEASE ORAL ONCE
Status: COMPLETED | OUTPATIENT
Start: 2022-12-23 | End: 2022-12-23

## 2022-12-23 RX ORDER — SODIUM CHLORIDE 0.9 % (FLUSH) 0.9 %
3-10 SYRINGE (ML) INJECTION AS NEEDED
Status: DISCONTINUED | OUTPATIENT
Start: 2022-12-23 | End: 2022-12-23 | Stop reason: HOSPADM

## 2022-12-23 RX ORDER — SODIUM CHLORIDE 0.9 % (FLUSH) 0.9 %
3 SYRINGE (ML) INJECTION EVERY 12 HOURS SCHEDULED
Status: DISCONTINUED | OUTPATIENT
Start: 2022-12-23 | End: 2022-12-23 | Stop reason: HOSPADM

## 2022-12-23 RX ORDER — NALOXONE HCL 0.4 MG/ML
0.04 VIAL (ML) INJECTION AS NEEDED
Status: DISCONTINUED | OUTPATIENT
Start: 2022-12-23 | End: 2022-12-23 | Stop reason: HOSPADM

## 2022-12-23 RX ADMIN — GABAPENTIN 300 MG: 300 CAPSULE ORAL at 16:33

## 2022-12-23 RX ADMIN — HYDROMORPHONE HYDROCHLORIDE 1 MG: 1 INJECTION, SOLUTION INTRAMUSCULAR; INTRAVENOUS; SUBCUTANEOUS at 05:14

## 2022-12-23 RX ADMIN — GABAPENTIN 300 MG: 300 CAPSULE ORAL at 20:30

## 2022-12-23 RX ADMIN — ROCURONIUM BROMIDE 35 MG: 10 INJECTION, SOLUTION INTRAVENOUS at 07:33

## 2022-12-23 RX ADMIN — POLYETHYLENE GLYCOL 3350 17 G: 17 POWDER, FOR SOLUTION ORAL at 10:49

## 2022-12-23 RX ADMIN — Medication 50 MG: at 07:59

## 2022-12-23 RX ADMIN — OXYCODONE HYDROCHLORIDE AND ACETAMINOPHEN 2 TABLET: 7.5; 325 TABLET ORAL at 14:25

## 2022-12-23 RX ADMIN — Medication 100 MCG: at 08:23

## 2022-12-23 RX ADMIN — FENTANYL CITRATE 100 MCG: 50 INJECTION, SOLUTION INTRAMUSCULAR; INTRAVENOUS at 07:32

## 2022-12-23 RX ADMIN — OXYCODONE HYDROCHLORIDE AND ACETAMINOPHEN 2 TABLET: 7.5; 325 TABLET ORAL at 18:39

## 2022-12-23 RX ADMIN — SODIUM CHLORIDE, SODIUM LACTATE, POTASSIUM CHLORIDE, AND CALCIUM CHLORIDE 100 ML/HR: 600; 310; 30; 20 INJECTION, SOLUTION INTRAVENOUS at 07:17

## 2022-12-23 RX ADMIN — GLYCOPYRROLATE 0.4 MG: 0.2 INJECTION INTRAMUSCULAR; INTRAVENOUS at 08:57

## 2022-12-23 RX ADMIN — DOCUSATE SODIUM 50 MG AND SENNOSIDES 8.6 MG 1 TABLET: 8.6; 5 TABLET, FILM COATED ORAL at 10:49

## 2022-12-23 RX ADMIN — CLINDAMYCIN IN 5 PERCENT DEXTROSE 900 MG: 18 INJECTION, SOLUTION INTRAVENOUS at 07:42

## 2022-12-23 RX ADMIN — PROPOFOL 150 MG: 10 INJECTION, EMULSION INTRAVENOUS at 07:32

## 2022-12-23 RX ADMIN — FENTANYL CITRATE 25 MCG: 50 INJECTION INTRAMUSCULAR; INTRAVENOUS at 09:43

## 2022-12-23 RX ADMIN — LOSARTAN POTASSIUM 25 MG: 50 TABLET, FILM COATED ORAL at 10:49

## 2022-12-23 RX ADMIN — HYDROMORPHONE HYDROCHLORIDE 1 MG: 1 INJECTION, SOLUTION INTRAMUSCULAR; INTRAVENOUS; SUBCUTANEOUS at 20:30

## 2022-12-23 RX ADMIN — CLONIDINE HYDROCHLORIDE 0.1 MG: 0.1 TABLET ORAL at 20:30

## 2022-12-23 RX ADMIN — CLONIDINE HYDROCHLORIDE 0.1 MG: 0.1 TABLET ORAL at 16:33

## 2022-12-23 RX ADMIN — GABAPENTIN 300 MG: 300 CAPSULE ORAL at 10:51

## 2022-12-23 RX ADMIN — FENTANYL CITRATE 25 MCG: 50 INJECTION INTRAMUSCULAR; INTRAVENOUS at 09:38

## 2022-12-23 RX ADMIN — METFORMIN HYDROCHLORIDE 1000 MG: 500 TABLET ORAL at 10:50

## 2022-12-23 RX ADMIN — POLYETHYLENE GLYCOL 3350 17 G: 17 POWDER, FOR SOLUTION ORAL at 20:30

## 2022-12-23 RX ADMIN — Medication 100 MCG: at 08:40

## 2022-12-23 RX ADMIN — Medication 3 MG: at 08:57

## 2022-12-23 RX ADMIN — LIDOCAINE HYDROCHLORIDE 100 MG: 20 INJECTION, SOLUTION EPIDURAL; INFILTRATION; INTRACAUDAL; PERINEURAL at 07:32

## 2022-12-23 RX ADMIN — OXYCODONE HYDROCHLORIDE 20 MG: 20 TABLET, FILM COATED, EXTENDED RELEASE ORAL at 07:19

## 2022-12-23 RX ADMIN — MAGNESIUM HYDROXIDE 10 ML: 2400 SUSPENSION ORAL at 10:49

## 2022-12-23 RX ADMIN — DOCUSATE SODIUM 50 MG AND SENNOSIDES 8.6 MG 1 TABLET: 8.6; 5 TABLET, FILM COATED ORAL at 20:30

## 2022-12-23 RX ADMIN — ATORVASTATIN CALCIUM 20 MG: 10 TABLET, FILM COATED ORAL at 10:50

## 2022-12-23 RX ADMIN — SODIUM CHLORIDE, SODIUM LACTATE, POTASSIUM CHLORIDE, AND CALCIUM CHLORIDE 100 ML/HR: 600; 310; 30; 20 INJECTION, SOLUTION INTRAVENOUS at 07:16

## 2022-12-23 RX ADMIN — ONDANSETRON 4 MG: 2 INJECTION INTRAMUSCULAR; INTRAVENOUS at 08:10

## 2022-12-23 RX ADMIN — HYDROMORPHONE HYDROCHLORIDE 0.5 MG: 1 INJECTION, SOLUTION INTRAMUSCULAR; INTRAVENOUS; SUBCUTANEOUS at 09:49

## 2022-12-23 RX ADMIN — OXYCODONE HYDROCHLORIDE AND ACETAMINOPHEN 1 TABLET: 10; 325 TABLET ORAL at 10:09

## 2022-12-23 RX ADMIN — CLONIDINE HYDROCHLORIDE 0.1 MG: 0.1 TABLET ORAL at 10:50

## 2022-12-23 RX ADMIN — HYDROCHLOROTHIAZIDE 12.5 MG: 25 TABLET ORAL at 10:49

## 2022-12-23 RX ADMIN — METFORMIN HYDROCHLORIDE 1000 MG: 500 TABLET ORAL at 18:40

## 2022-12-23 NOTE — CASE MANAGEMENT/SOCIAL WORK
Discharge Planning Assessment   [place]     Patient Name: Manuel Moscoso  MRN: [id number]  Today's Date: 12/23/2022    Admit Date: 12/19/2022        Discharge Needs Assessment     Row Name 12/23/22 1532       Living Environment    People in Home spouse    Current Living Arrangements home    Primary Care Provided by self    Provides Primary Care For no one    Quality of Family Relationships involved;helpful;supportive    Able to Return to Prior Arrangements yes       Resource/Environmental Concerns    Resource/Environmental Concerns none       Transition Planning    Patient/Family Anticipates Transition to home with family    Patient/Family Anticipated Services at Transition durable medical equipment    Transportation Anticipated family or friend will provide       Discharge Needs Assessment    Readmission Within the Last 30 Days no previous admission in last 30 days    Equipment Currently Used at Home bp cuff;pulse ox    Concerns to be Addressed discharge planning    Anticipated Changes Related to Illness none    Equipment Needed After Discharge walker, rolling    Provided Post Acute Provider List? Yes    Post Acute Provider List DME Supplier    Discharge Coordination/Progress Pt lives will return home at d/c, likely on Saturday. He will need a rolling walker. Discussed options with pt. Walker ordered through [employer] and they will deliver to pt's room today.               Discharge Plan    No documentation.               Continued Care and Services - Admitted Since 12/19/2022     Durable Medical Equipment Coordination complete.    Service Provider Request Status Selected Services Address Phone Fax Patient Preferred    [facility]  [facility] Durable Medical [facility] [address] [facility] [facility] [place] [address] [phone] --              Expected Discharge Date and Time     Expected Discharge Date Expected Discharge Time    Dec 24, 2022          Demographic Summary    No documentation.                 Functional Status    No documentation.                Psychosocial    No documentation.                Abuse/Neglect    No documentation.                Legal    No documentation.                Substance Abuse    No documentation.                Patient Forms    No documentation.                   YAMILKA Deras

## 2022-12-23 NOTE — PLAN OF CARE
Call placed to patient. No answer received. Left message to call office.   Goal Outcome Evaluation:  Plan of Care Reviewed With: patient        Progress: improving  Outcome Evaluation: PT completed re-eval s/p his 3rd and final part back surgery. He continues with L hip/back pain and weakness that was present after his 2nd part back surgery Wednesday. He was able to ambulates today with an antalgic gait in L LE, but he reports this is an improvement since yesterday. No L LE buckling or LOB with gait today. He ambulates 200 ft with CGA and RW use. PT will cont with POC. He is hopeful to d.c home with family tomorrow.

## 2022-12-23 NOTE — ANESTHESIA POSTPROCEDURE EVALUATION
"Patient: Manuel Moscoso    Procedure Summary     Date: 12/23/22 Room / Location: [facility] OR  /  [facility] OR    Anesthesia Start: 0729 Anesthesia Stop:     Procedure: POSTERIOR SPINAL FUSION WITH INSTRUMENTATION L4-S1 (Spine Lumbar) Diagnosis: (M54.16)    Surgeons: CHARITO Garcia MD Provider: Melanie Garnica CRNA    Anesthesia Type: general ASA Status: 2          Anesthesia Type: general    Vitals  Vitals Value Taken Time   /78 12/23/22 1012   Temp 98.2 °F (36.8 °C) 12/23/22 1000   Pulse 85 12/23/22 1014   Resp 18 12/23/22 1000   SpO2 99 % 12/23/22 1014   Vitals shown include unvalidated device data.        Post Anesthesia Care and Evaluation    Patient location during evaluation: PACU  Patient participation: complete - patient participated  Level of consciousness: awake and alert  Pain management: adequate    Airway patency: patent  Anesthetic complications: No anesthetic complications  PONV Status: none  Cardiovascular status: acceptable and hemodynamically stable  Respiratory status: acceptable  Hydration status: acceptable    Comments: Blood pressure 137/92, pulse 82, temperature 98.7 °F (37.1 °C), temperature source Oral, resp. rate 18, height 175.3 cm (69\"), weight 101 kg (223 lb 9.6 oz), SpO2 99 %.    Patient discharged from PACU based upon Ismael score. Please see RN notes for further details      "

## 2022-12-23 NOTE — OP NOTE
Posterior lumbar fusion with instrumentation procedure Note    Manuel Moscoso  12/23/2022    Pre-op Diagnosis:     1. Status post left L4-5 microdiskectomy, 11/18/2019.  2. Status post revision left L4-5 microdiskectomy, insertion annular closure device, 11/01/2021.  3. Increasing chronic low back pain.  4. Residual left buttock, thigh and leg radiculopathy.  5. Neurogenic claudication.  6. Multilevel lumbar degenerative disc disease, L2 to S1, severe L4 to S1.  7. Multilevel lumbar facet arthropathy, severe L4 to S1.  8. Degenerative scoliosis concave left L4-5.  9. Severe central and bilateral foraminal stenosis, L4 to S1, worse right L4-5, left L5-S1.  10. History of diabetic peripheral neuropathy.  11. History of alpha-gal syndrome after tick bite.  12.  Status post anterior decompression, ALIF with instrumentation L5-S1, 12/19/2022  13.  Status post left LLIF with instrumentation L4-5, 12/21/2022    Post-op Diagnosis:    same    Procedure/CPT® Codes:     1.  Posterior spinal fusion L4-S1  2.  Posterior spinal instrumentation L4-S1 (ATEC pedicle screws and rods)  3.  Use of locally obtained autograft bone for fusion  4.  Use of fluoroscopy for confirmation of surgical level and placement of instrumentation  5.  Intraoperative neuromonitoring with pedicle screw stimulation     Anesthesia: General     Surgeon: CASSIE Garcia MD     Assistant:   Joesph Wallace PA-C     Estimated Blood Loss: minimal     Complications: None     Condition: Stable to PACU.     Indications:     The patient is a 62-year-old without a primary care physician.  He underwent a left L4-5 microdiscectomy on 11/18/2019 and a revision left L4-5 microdiscectomy with the insertion of an annular closure device on 11/1/2021.  Unfortunately continued to complain of increasing chronic low back pain along with residual left buttock, thigh, and leg radiculopathy as well as symptoms consistent with neurogenic claudication.  Repeat imaging studies  revealed multilevel lumbar degenerative disc disease and facet arthropathy that was severe from L4-S1, where there was a degenerative scoliosis concave to the left at L4-5.  The degenerative changes created severe central and bilateral foraminal stenosis at both levels from L4-S1 that was worse on the right at L4-5 and on the left at L5-S1.      After failing conservative measures, it was mutually decided that surgery would be the best option. Risks, benefits, and complications of surgery were discussed with the patient. The patient appeared well informed and wished to proceed. We specifically discussed the risk of infection, blood loss, nerve root injury, CSF leak, and the possibility of incomplete resolution of symptoms. We also discussed the possible risk of a nonunion and the potential need for additional surgery in the event of a pseudoarthrosis or hardware failure.      We elected proceed with a staged operation.  The first stage of the procedure was performed on 12/19/2022 and involved an anterior decompression and ALIF with instrumentation of L5-S1.  This level required a separate stage as it is not accessible through a lateral approach.   The second stage of the procedure was performed on 12/21/2022 and involved a lateral fusion of L4-5.  Today we return to surgery for the final posterior stage the procedure involving a posterior spinal fusion with instrumentation from L4 to S1.     Operative Procedure:     After obtaining informed consent and verifying the correct operative site, the patient was brought to the operating room. A general anesthetic was provided by the anesthesia service with the assistance of an endotracheal tube. Once this was appropriately positioned and secured, the patient was carefully rotated prone onto a Christopher frame. All bony processes were well-padded. The lumbar region was prepped and draped in usual sterile fashion. A surgical timeout was taken to confirm this was the correct  patient, we were working at the correct levels, and that preoperative antibiotics were given in a timely fashion.      Using fluoroscopy for guidance, a right-sided Charline incision was created overlying L4 to S1 using a 10 blade scalpel.  Dissection was carried through subcutaneous tissues using Bovie cautery.  The lumbar fascia was divided in line with the incision and blunt dissection was carried between the multifidus and the longissimus down to the facet joints of L4-5 and L5-S1.  Dissection was carried laterally exposing the transverse processes of L4, L5, and the sacral ala.  We then exposed the facet joints further.  The capsules were destroyed using Bovie cautery exposing as much bone as possible.  The high-speed bur was then used to decorticate the facet joints, destroying as much of the facet cartilage as possible.  I also decorticated the lateral pars region and the transverse processes.  The locally obtained autograft bone was left in situ in the posterolateral gutter.  This constituted a posterior fusion of L4-5 and L5-S1.      Next under fluoroscopic guidance, Jamshidi needles were used to cannulate the pedicles of L4, L5, and S1.  Once the needles were properly positioned in the pedicles, guidewires were placed to maintain position.  Over each of the guidewires, an [facility] pedicle screw was placed.  We used 6.5 mm x 50 mm screws into each of the pedicles of L4 and L5 and a 6.5 mm x 45 mm screw into the pedicle of S1.  I then used a neuromonitoring probe to stimulate the screws themselves confirming appropriate position ensuring no breach of the pedicles.  After confirming the screws were properly positioned, an appropriate-sized richard was chosen to span the pedicle screws.  The richard was tightened into position using set screws.  The set screws were tightened using the appropriate anti-torque wrench and torque-limiting screwdriver provided by [facility].     Next again under fluoroscopic guidance, I created stab  incisions over the pedicles on the left side at L4 and S1.  Through these incisions, I used the Jamshidi needles to cannulate the pedicles.  Once properly positioned, I placed guidewires to maintain position.  I then again placed a 6.5 mm x 50 mm screw into the pedicle of L4 and a 6.5 mm x 45 mm screw into the pedicle of S1.  I then used the neuromonitoring probe to stabilize to screws again ensuring that they were properly positioned.  A second richard was chosen and passed under the musculature.  This was held into position using set screws tightened in the same fashion using the anti-torque wrench and torque-loading screwdriver again provided by [facility].      The wounds were then thoroughly irrigated and an inspection was then undertaken to ensure that we had adequate hemostasis.  Bleeding at this point was controlled using thrombin with Gelfoam powder and bipolar cautery.  Final fluoroscopy imaging confirmed adequate position of the newly placed posterior instrumentation spanning L4 to S1.      Wound closure was then accomplished by reapproximating the fascia with a #1 Vicryl area immediate subcutaneous tissues were closed using a 2-0 Vicryl and skin closure was augmented using Mastisol and Steri-Strips.  The incisions were then washed and sterilely dressed with Bioclusive dressings.      The patient was then carefully rotated supine onto a hospital gurney, extubated, and sent to the recovery room in good stable condition.  The patient tolerated the procedure well.  There were no complications.  We estimated blood loss to be minimal.    Intraoperative neuromonitoring was ordered and carried out throughout the procedure to add an increased level of safety for the patient.  The interpreting physician was available by means of real-time continuous, bidirectional, remote audio and visual communication as needed throughout the entire procedure.  Modalities used during the procedure included SSEP, EEG, EMG, TOF, and pedicle  screw stimulation.  There were no neuromonitoring signal changes during the procedure.      Joesph Wallace PA-C provided critical assistance during the procedure.  His assistance was medically necessary in order to allow the procedure to occur in the most safe and efficient manner.    CASSIE Garcia MD     Date: 12/23/2022  Time: 13:18 CST

## 2022-12-23 NOTE — PAYOR COMM NOTE
FROM: ANGEL CLAIRE  PHONE: [phone]  FAX: [phone]    AUTH: [id number]    RehanMaegan de anda (62 y.o. Male)     Date of Birth   1960    Social Security Number       Address   [facility] [address] [place] [address]    Home Phone   [phone]    MRN   [id number]       [place]   Other    Marital Status                               Admission Date   12/19/22    Admission Type   Elective    Admitting Provider   CHARITO Garcia MD    Attending Provider   CHARITO Garcia MD    Department, Room/Bed   [facility] 3A, 328/1       Discharge Date       Discharge Disposition       Discharge Destination                               Attending Provider: CHARITO Garcia MD    Allergies: Alpha-gal, Penicillins    Isolation: None   Infection: None   Code Status: CPR    Ht: 175.3 cm (69\")   Wt: 101 kg (223 lb 9.6 oz)    Admission Cmt: None   Principal Problem: Lumbago of multiple sites in spine with sciatica [M54.40]                 Active Insurance as of 12/19/2022     Primary Coverage     Payor Plan Insurance Group Employer/Plan Group    [employer] BLUE [employer] PPO [id number]     Payor Plan Address Payor Plan Phone Number Payor Plan Fax Number Effective Dates    [address] [phone]  3/1/2021 - None Entered    [place] [address]       Subscriber Name Subscriber Birth Date Member ID       MAEGAN FOSTER 1960 [id number]                 Emergency Contacts      (Rel.) Home Phone Work Phone Mobile Phone    REHANBONNYHECTOR (Friend) -- -- [phone]               Operative/Procedure Notes (last 72 hours)      CHARITO Garcia MD at 12/21/22 0654        Lateral lumbar interbody fusion procedure Note    Maegan Marinelli Blanka  12/21/2022    Pre-op Diagnosis:     1. Status post left L4-5 microdiskectomy, 11/18/2019.  2. Status post revision left L4-5 microdiskectomy, insertion annular closure device, 11/01/2021.  3. Increasing chronic low  back pain.  4. Residual left buttock, thigh and leg radiculopathy.  5. Neurogenic claudication.  6. Multilevel lumbar degenerative disc disease, L2 to S1, severe L4 to S1.  7. Multilevel lumbar facet arthropathy, severe L4 to S1.  8. Degenerative scoliosis concave left L4-5.  9. Severe central and bilateral foraminal stenosis, L4 to S1, worse right L4-5, left L5-S1.  10. History of diabetic peripheral neuropathy.  11. History of alpha-gal syndrome after tick bite.  12.  Status post anterior decompression, ALIF with instrumentation L5-S1, 12/19/2022    Post-op Diagnosis:    same    Procedure/CPT® Codes:    1.  Left lateral lumbar interbody fusion L4-5  2.  Anterior spinal instrumentation L4-5 (Nu[employer]asive lateral plate and screws)  3.  Use of PEEK interbody biomechanical device for fusion L4-5 ([employer] porous PEEK spacer)  4.  Use of allograft bone matrix for fusion ([employer])  5.  Use of fluoroscopy for confirmation of surgical level, placement of PEEK spacer and instrumentation  6.  Intraoperative neural monitoring    Anesthesia: General    Surgeon: CASSIE Garcia MD    Assistant: Joesph Wallace PA-C    Estimated Blood Loss: 50 mL    Complications: None    Condition: Stable to PACU.    Indications:    The patient is a 62-year-old without a primary care physician.  He underwent a left L4-5 microdiscectomy on 11/18/2019 and a revision left L4-5 microdiscectomy with the insertion of an annular closure device on 11/1/2021.  Unfortunately continued to complain of increasing chronic low back pain along with residual left buttock, thigh, and leg radiculopathy as well as symptoms consistent with neurogenic claudication.  Repeat imaging studies revealed multilevel lumbar degenerative disc disease and facet arthropathy that was severe from L4-S1, where there was a degenerative scoliosis concave to the left at L4-5.  The degenerative changes created severe central and bilateral foraminal stenosis at both levels from  L4-S1 that was worse on the right at L4-5 and on the left at L5-S1.    After failing conservative measures, it was mutually decided that surgery would be the best option. Risks, benefits, and complications of surgery were discussed with the patient. The patient appeared well informed and wished to proceed. We specifically discussed the risk of infection, blood loss, nerve root injury, CSF leak, and the possibility of incomplete resolution of symptoms. We also discussed the possible risk of a nonunion and the potential need for additional surgery in the event of a pseudoarthrosis or hardware failure.     We elected proceed with a staged operation.  The first stage of the procedure was performed on 12/19/2022 and involved an anterior decompression and ALIF with instrumentation of L5-S1.  This level required a separate stage as it is not accessible through a lateral approach.  Today we are performing a lateral fusion of the L4-5 level.  It is planned we will be proceeding with a final posterior procedure at a later date involving a posterior spinal fusion with instrumentation spanning L4-S1.    Operative Procedure:    After obtaining informed consent and verifying the correct operative site, the patient was brought to the operating room and placed supine on operating table.  A general anesthetic was provided by the anesthesia service with the assistance of an endotracheal tube.  Once this was appropriately positioned and secured, the patient was carefully rotated into the lateral decubitus position with the left side up. All bony prominences were well-padded.  3 inch wide cloth tape was used to maintain the patient's position.  It was also used to tip open the pelvis slightly allowing better access to the lumbar spine through a lateral approach.  Fluoroscopy was then used to identify the L4-5 level, and the skin was marked for our planned incision.  The left flank was then prepped and draped in the usual sterile  fashion.  A surgical timeout was taken to confirm this was the correct patient, we were working at the correct level, and that preoperative antibiotics were given in a timely fashion.    An oblique incision was created of the left flank using a 10 blade scalpel directly centered over the L4-5 segment. Dissection was carried bluntly through subcutaneous tissues. Blunt dissection was also carried between the external oblique and internal oblique musculature. The transversalis fascia was pierced allowing access to the retroperitoneal space. At this point, a series of neuromonitoring probes were used to safely access the L4-5 level. We used stimulated and free run EMG for this purpose. Once nerve roots were confirmed to be out of harm's way, self retaining retractors were placed for continuous exposure.     An annulotomy was then created at the L4-5 area using a 15 blade scalpel on a long handle. A Fisher elevator was used to remove disc material off of the endplates. Disc material was removed using Kerrisons, pituitaries, and forward angled curettes. Once all disc material had been retrieved, I used the Fisher elevator to divide the contralateral annulus. This assisted with mobilization of the vertebral body. We then used a series of endplate scrapers to prepare the endplates for interbody fusion. Trial spacers were malleted into position and for the disc space it was felt that a 12 mm x 55 mm implant would be the best fit to restore disc height. The disc space was then thoroughly irrigated with saline solution.     A porous PEEK spacer from the NuVasive instrumentation set measuring 12 mm x 55 mm x 22 mm was then packed with allograft bone matrix as tightly as possible. This PEEK spacer was then malleted into the L4-5 disc space under fluoroscopic guidance. It was placed as a PEEK interbody biomechanical device to assist with interbody fusion. Once this spacer was confirmed to be properly positioned, I chose a 2-hole  plate to help augment the fusion of L4-5. This plate was held into position using screws. I placed a 5.5 mm x 35 mm screw into both L4 and L5 as fixation.    The wound was then irrigated thoroughly. A thorough inspection was then undertaken to ensure that we had adequate hemostasis. Bleeding at this point was controlled using thrombin with Gelfoam powder and bipolar cautery. Closure was then accomplished with a #1 Vicryl reapproximating the internal and external oblique musculature. Immediate subcutaneous cutaneous tissues were closed with a 3-0 Vicryl. And skin closure was accomplished using Mastisol and Steri-Strips. The wound was then sterilely dressed. The patient was then carefully rotated supine onto a hospital gurney, extubated, and sent to the recovery room in good stable condition.     The patient tolerated the procedure well. There were no complications. We estimate blood loss to be approximately 50 mL. The patient remained hemodynamically stable.     Intraoperative neuro monitoring was ordered and carried out throughout the procedure to add an increased level of safety for the patient.  The interpreting physician was available by means of real-time continuous, bidirectional, remote audio and visual communication as needed throughout the entire procedure.  Modalities used during the procedure included SSEP, EEG, EMG, and TOF.  There were no neuro monitoring signal changes during the procedure.    Joesph Wallace PA-C provided critical assistance during the procedure. His assistance was medically necessary in order to allow the procedure to occur in the most safe and efficient manner.    CASSIE Garcia MD     Date: 12/21/2022  Time: 10:48 CST    Electronically signed by CHARITO Garcia MD at 12/21/22 1048     CHARITO Garcia MD at 12/23/22 0632        Posterior lumbar fusion with instrumentation procedure Note    Manuel Yves Blanka  12/23/2022    Pre-op Diagnosis:     1. Status post left L4-5  microdiskectomy, 11/18/2019.  2. Status post revision left L4-5 microdiskectomy, insertion annular closure device, 11/01/2021.  3. Increasing chronic low back pain.  4. Residual left buttock, thigh and leg radiculopathy.  5. Neurogenic claudication.  6. Multilevel lumbar degenerative disc disease, L2 to S1, severe L4 to S1.  7. Multilevel lumbar facet arthropathy, severe L4 to S1.  8. Degenerative scoliosis concave left L4-5.  9. Severe central and bilateral foraminal stenosis, L4 to S1, worse right L4-5, left L5-S1.  10. History of diabetic peripheral neuropathy.  11. History of alpha-gal syndrome after tick bite.  12.  Status post anterior decompression, ALIF with instrumentation L5-S1, 12/19/2022  13.  Status post left LLIF with instrumentation L4-5, 12/21/2022    Post-op Diagnosis:    same    Procedure/CPT® Codes:     1.  Posterior spinal fusion L4-S1  2.  Posterior spinal instrumentation L4-S1 ([employer] pedicle screws and rods)  3.  Use of locally obtained autograft bone for fusion  4.  Use of fluoroscopy for confirmation of surgical level and placement of instrumentation  5.  Intraoperative neuromonitoring with pedicle screw stimulation     Anesthesia: General     Surgeon: CASSIE Garcia MD     Assistant:   Joesph Wallace PA-C     Estimated Blood Loss: minimal     Complications: None     Condition: Stable to PACU.     Indications:     The patient is a 62-year-old without a primary care physician.  He underwent a left L4-5 microdiscectomy on 11/18/2019 and a revision left L4-5 microdiscectomy with the insertion of an annular closure device on 11/1/2021.  Unfortunately continued to complain of increasing chronic low back pain along with residual left buttock, thigh, and leg radiculopathy as well as symptoms consistent with neurogenic claudication.  Repeat imaging studies revealed multilevel lumbar degenerative disc disease and facet arthropathy that was severe from L4-S1, where there was a degenerative scoliosis  concave to the left at L4-5.  The degenerative changes created severe central and bilateral foraminal stenosis at both levels from L4-S1 that was worse on the right at L4-5 and on the left at L5-S1.      After failing conservative measures, it was mutually decided that surgery would be the best option. Risks, benefits, and complications of surgery were discussed with the patient. The patient appeared well informed and wished to proceed. We specifically discussed the risk of infection, blood loss, nerve root injury, CSF leak, and the possibility of incomplete resolution of symptoms. We also discussed the possible risk of a nonunion and the potential need for additional surgery in the event of a pseudoarthrosis or hardware failure.      We elected proceed with a staged operation.  The first stage of the procedure was performed on 12/19/2022 and involved an anterior decompression and ALIF with instrumentation of L5-S1.  This level required a separate stage as it is not accessible through a lateral approach.   The second stage of the procedure was performed on 12/21/2022 and involved a lateral fusion of L4-5.  Today we return to surgery for the final posterior stage the procedure involving a posterior spinal fusion with instrumentation from L4 to S1.     Operative Procedure:     After obtaining informed consent and verifying the correct operative site, the patient was brought to the operating room. A general anesthetic was provided by the anesthesia service with the assistance of an endotracheal tube. Once this was appropriately positioned and secured, the patient was carefully rotated prone onto a Christopher frame. All bony processes were well-padded. The lumbar region was prepped and draped in usual sterile fashion. A surgical timeout was taken to confirm this was the correct patient, we were working at the correct levels, and that preoperative antibiotics were given in a timely fashion.      Using fluoroscopy for  guidance, a right-sided Hcarline incision was created overlying L4 to S1 using a 10 blade scalpel.  Dissection was carried through subcutaneous tissues using Bovie cautery.  The lumbar fascia was divided in line with the incision and blunt dissection was carried between the multifidus and the longissimus down to the facet joints of L4-5 and L5-S1.  Dissection was carried laterally exposing the transverse processes of L4, L5, and the sacral ala.  We then exposed the facet joints further.  The capsules were destroyed using Bovie cautery exposing as much bone as possible.  The high-speed bur was then used to decorticate the facet joints, destroying as much of the facet cartilage as possible.  I also decorticated the lateral pars region and the transverse processes.  The locally obtained autograft bone was left in situ in the posterolateral gutter.  This constituted a posterior fusion of L4-5 and L5-S1.      Next under fluoroscopic guidance, Jamshidi needles were used to cannulate the pedicles of L4, L5, and S1.  Once the needles were properly positioned in the pedicles, guidewires were placed to maintain position.  Over each of the guidewires, an [facility] pedicle screw was placed.  We used 6.5 mm x 50 mm screws into each of the pedicles of L4 and L5 and a 6.5 mm x 45 mm screw into the pedicle of S1.  I then used a neuromonitoring probe to stimulate the screws themselves confirming appropriate position ensuring no breach of the pedicles.  After confirming the screws were properly positioned, an appropriate-sized richard was chosen to span the pedicle screws.  The richard was tightened into position using set screws.  The set screws were tightened using the appropriate anti-torque wrench and torque-limiting screwdriver provided by [facility].     Next again under fluoroscopic guidance, I created stab incisions over the pedicles on the left side at L4 and S1.  Through these incisions, I used the Jamshidi needles to cannulate the pedicles.   Once properly positioned, I placed guidewires to maintain position.  I then again placed a 6.5 mm x 50 mm screw into the pedicle of L4 and a 6.5 mm x 45 mm screw into the pedicle of S1.  I then used the neuromonitoring probe to stabilize to screws again ensuring that they were properly positioned.  A second richard was chosen and passed under the musculature.  This was held into position using set screws tightened in the same fashion using the anti-torque wrench and torque-loading screwdriver again provided by [facility].      The wounds were then thoroughly irrigated and an inspection was then undertaken to ensure that we had adequate hemostasis.  Bleeding at this point was controlled using thrombin with Gelfoam powder and bipolar cautery.  Final fluoroscopy imaging confirmed adequate position of the newly placed posterior instrumentation spanning L4 to S1.      Wound closure was then accomplished by reapproximating the fascia with a #1 Vicryl area immediate subcutaneous tissues were closed using a 2-0 Vicryl and skin closure was augmented using Mastisol and Steri-Strips.  The incisions were then washed and sterilely dressed with Bioclusive dressings.      The patient was then carefully rotated supine onto a hospital gurney, extubated, and sent to the recovery room in good stable condition.  The patient tolerated the procedure well.  There were no complications.  We estimated blood loss to be minimal.    Intraoperative neuromonitoring was ordered and carried out throughout the procedure to add an increased level of safety for the patient.  The interpreting physician was available by means of real-time continuous, bidirectional, remote audio and visual communication as needed throughout the entire procedure.  Modalities used during the procedure included SSEP, EEG, EMG, TOF, and pedicle screw stimulation.  There were no neuromonitoring signal changes during the procedure.      Joesph Wallace PA-C provided critical assistance  during the procedure.  His assistance was medically necessary in order to allow the procedure to occur in the most safe and efficient manner.    CASSIE Garcia MD     Date: 12/23/2022  Time: 13:18 CST        Electronically signed by CHARITO Garcia MD at 12/23/22 1318          Physician Progress Notes (last 48 hours)      Adolph Chao MD at 12/23/22 0934          Manuel Moscoso is a 62 y.o. male patient.      Pain out of control.  Plan for third and final step of surgery today.  Still with left leg weakness but improving per physical therapy notes.      Current Facility-Administered Medications   Medication Dose Route Frequency Provider Last Rate Last Admin   • [MAR Hold] acetaminophen (TYLENOL) tablet 650 mg  650 mg Oral Q4H PRN CHARITO Garcia MD   650 mg at 12/22/22 1455   • [MAR Hold] atorvastatin (LIPITOR) tablet 20 mg  20 mg Oral Daily CHARITO Garcia MD   20 mg at 12/22/22 0848   • [MAR Hold] atropine sulfate injection 0.5 mg  0.5 mg Intravenous Once PRN CHARITO Garcia MD       • atropine sulfate injection 0.5 mg  0.5 mg Intravenous Once PRN Miracle White MD       • [MAR Hold] bisacodyl (DULCOLAX) suppository 10 mg  10 mg Rectal Daily PRN Adolph Chao MD       • cloNIDine (CATAPRES) tablet 0.1 mg  0.1 mg Oral TID CHARITO Garcia MD   0.1 mg at 12/22/22 2005   • [MAR Hold] cyclobenzaprine (FLEXERIL) tablet 10 mg  10 mg Oral TID PRN CHARITO Garcia MD   10 mg at 12/22/22 2011   • [MAR Hold] dextrose (D50W) (25 g/50 mL) IV injection 25 g  25 g Intravenous Q15 Min PRN CHARITO Garcia MD       • [MAR Hold] dextrose (GLUTOSE) oral gel 15 g  15 g Oral Q15 Min PRN CHARITO Garcia MD       • droperidol (INAPSINE) injection 0.625 mg  0.625 mg Intravenous Once PRN Miracle White MD        Or   • droperidol (INAPSINE) injection 0.625 mg  0.625 mg Intramuscular Once PRN Miracle White MD       • [MAR Hold] fentaNYL citrate (PF) (SUBLIMAZE)  injection 25 mcg  25 mcg Intravenous Q5 Min PRN CHARITO Garcia MD       • fentaNYL citrate (PF) (SUBLIMAZE) injection 25 mcg  25 mcg Intravenous Q5 Min PRN Miracle White MD       • [MAR Hold] flumazenil (ROMAZICON) injection 0.2 mg  0.2 mg Intravenous PRN CHARITO Garcia MD       • flumazenil (ROMAZICON) injection 0.2 mg  0.2 mg Intravenous PRN Miracle White MD       • gabapentin (NEURONTIN) capsule 300 mg  300 mg Oral TID CHARITO Garcia MD   300 mg at 12/22/22 2006   • [MAR Hold] glucagon (human recombinant) (GLUCAGEN DIAGNOSTIC) injection 1 mg  1 mg Intramuscular Q15 Min PRN CHARITO Garcia MD       • [MAR Hold] hydroCHLOROthiazide (HYDRODIURIL) tablet 12.5 mg  12.5 mg Oral Daily CHARITO Garcia MD   12.5 mg at 12/22/22 0848   • [MAR Hold] HYDROmorphone (DILAUDID) injection 0.5 mg  0.5 mg Intravenous Q10 Min PRN CHARITO Garcia MD   0.5 mg at 12/21/22 1202   • HYDROmorphone (DILAUDID) injection 0.5 mg  0.5 mg Intravenous Q10 Min PRN Miracle White MD       • [MAR Hold] HYDROmorphone (DILAUDID) injection 1 mg  1 mg Intravenous Q3H PRN CHARITO Garcia MD   1 mg at 12/23/22 0514    And   • [MAR Hold] naloxone (NARCAN) injection 0.4 mg  0.4 mg Intravenous Q5 Min PRN CHARITO Garcia MD       • ibuprofen (ADVIL,MOTRIN) tablet 600 mg  600 mg Oral Once PRN Miracle White MD       • [MAR Hold] Insulin Lispro (humaLOG) injection 2-7 Units  2-7 Units Subcutaneous TID AC CHARITO Garcia MD   2 Units at 12/22/22 1217   • labetalol (NORMODYNE,TRANDATE) injection 5 mg  5 mg Intravenous Q5 Min PRN CHARITO Garcia MD       • labetalol (NORMODYNE,TRANDATE) injection 5 mg  5 mg Intravenous Q5 Min PRN Miracle White MD       • lactated ringers infusion  100 mL/hr Intravenous Continuous PRN CHARITO Garcia  mL/hr at 12/23/22 0729 Restarted at 12/23/22 0835   • lactated ringers infusion  100 mL/hr Intravenous Continuous Miracle White   mL/hr at 12/23/22 0729 Restarted at 12/23/22 0835   • losartan (COZAAR) tablet 25 mg  25 mg Oral Daily CHARITO Garcia MD   25 mg at 12/22/22 0848   • [MAR Hold] magnesium hydroxide (MILK OF MAGNESIA) suspension 10 mL  10 mL Oral Daily PRN Adolph Choa MD       • [MAR Hold] metFORMIN (GLUCOPHAGE) tablet 1,000 mg  1,000 mg Oral BID With Meals CHARITO Garcia MD   1,000 mg at 12/22/22 1723   • [MAR Hold] naloxone (NARCAN) injection 0.04 mg  0.04 mg Intravenous PRN CHARITO Garcia MD       • naloxone (NARCAN) injection 0.04 mg  0.04 mg Intravenous PRN Miracle White MD       • [MAR Hold] ondansetron (ZOFRAN) tablet 4 mg  4 mg Oral Q6H PRN CHARITO Garcia MD        Or   • [MAR Hold] ondansetron (ZOFRAN) injection 4 mg  4 mg Intravenous Q6H PRN CHARITO Garcia MD   4 mg at 12/22/22 0853   • [MAR Hold] ondansetron (ZOFRAN) injection 4 mg  4 mg Intravenous Q15 Min PRN CHARITO Garcia MD       • ondansetron (ZOFRAN) injection 4 mg  4 mg Intravenous Q15 Min PRN Miracle White MD       • [MAR Hold] oxyCODONE-acetaminophen (PERCOCET)  MG per tablet 1 tablet  1 tablet Oral Once PRN CHARITO Garcia MD       • oxyCODONE-acetaminophen (PERCOCET)  MG per tablet 1 tablet  1 tablet Oral Once PRN Miracle White MD       • [MAR Hold] oxyCODONE-acetaminophen (PERCOCET) 7.5-325 MG per tablet 1 tablet  1 tablet Oral Q4H PRN CHARITO Garcia MD   1 tablet at 12/22/22 1254   • [MAR Hold] oxyCODONE-acetaminophen (PERCOCET) 7.5-325 MG per tablet 2 tablet  2 tablet Oral Q4H PRN CHARITO Garcia MD   2 tablet at 12/22/22 2011   • [MAR Hold] polyethylene glycol (MIRALAX) packet 17 g  17 g Oral BID Adolph Chao MD   17 g at 12/22/22 2006   • [MAR Hold] sennosides-docusate (PERICOLACE) 8.6-50 MG per tablet 1 tablet  1 tablet Oral BID CHARITO Garcia MD   1 tablet at 12/22/22 2005   • [MAR Hold] sodium chloride 0.9 % flush 10 mL  10 mL Intravenous PRN Radha,  CHARITO Owen MD   10 mL at 12/19/22 2013   • [MAR Hold] sodium chloride 0.9 % flush 3 mL  3 mL Intravenous Q12H CHARITO Garcia MD   3 mL at 12/22/22 2008   • [MAR Hold] sodium chloride 0.9 % infusion 40 mL  40 mL Intravenous PRN CHARITO Garcia MD         Facility-Administered Medications Ordered in Other Encounters   Medication Dose Route Frequency Provider Last Rate Last Admin   • dexmedetomidine (PRECEDEX) 20 mcg/5 mL Pediatric Syringe   Intravenous PRN Garnica, Melanie A, CRNA   20 mcg at 12/23/22 0732   • fentaNYL citrate (PF) (SUBLIMAZE) injection   Intravenous PRN Garnica, Melanie A, CRNA   100 mcg at 12/23/22 0732   • glycopyrrolate (ROBINUL) injection   Intravenous PRN Garnica, Melanie A, CRNA   0.4 mg at 12/23/22 0857   • ketamine hcl syringe solution prefilled syringe   Intravenous PRN Garnica, Melanie A, CRNA   50 mg at 12/23/22 0759   • lidocaine PF 2% (XYLOCAINE) injection   Intravenous PRN Garnica, Melanie A, CRNA   100 mg at 12/23/22 0732   • Neostigmine Methylsulfate syringe   Intravenous PRN Garnica, Melanie A, CRNA   3 mg at 12/23/22 0857   • ondansetron (ZOFRAN) injection   Intravenous PRN Garnica, Melanie A, CRNA   4 mg at 12/23/22 0810   • Phenylephrine HCl-NaCl 100 mcg/ml injection   Intravenous PRN Garnica, Melanie A, CRNA   100 mcg at 12/23/22 0840   • Propofol (DIPRIVAN) injection   Intravenous PRN Garnica, Melanie A, CRNA   150 mg at 12/23/22 0732   • rocuronium (ZEMURON) injection   Intravenous PRN Garnica, Melanie A, CRNA   35 mg at 12/23/22 0733   • vasopressin injection solution   Intravenous PRN Garnica, Melanie A, CRNA   0.5 mL at 12/23/22 0800     ALLERGIES:    Allergies   Allergen Reactions   • Alpha-Gal Nausea And Vomiting and GI Intolerance     Red meat allergy   • Penicillins Other (See Comments)     STATES HE \"HAS NO IDEA\" AND THAT IT WAS A CHILDHOOD ALLERGY       Lumbago of multiple sites in spine with sciatica    Lumbosacral disc disease     Chronic bilateral low back pain without sciatica    Type 2 diabetes mellitus with hyperglycemia, without long-term current use of insulin (HCC)    Essential hypertension    Drug-induced constipation    Blood pressure 114/71, pulse 92, temperature 98.3 °F (36.8 °C), temperature source Temporal, resp. rate 16, height 175.3 cm (69\"), weight 101 kg (223 lb 9.6 oz), SpO2 98 %.      Subjective:  Symptoms:  Stable.    Diet:  Poor intake.    Activity level: Impaired due to pain.    Pain:  He complains of pain that is severe.  He reports pain is unchanged.  Pain is partially controlled.      Review of Systems  Objective:  General Appearance:  Comfortable, well-appearing and in distress (From pain).    Vital signs: (most recent): Blood pressure 114/71, pulse 92, temperature 98.3 °F (36.8 °C), temperature source Temporal, resp. rate 16, height 175.3 cm (69\"), weight 101 kg (223 lb 9.6 oz), SpO2 98 %.  Vital signs are normal.    Output: Producing urine and minimal stool output.    HEENT: Normal HEENT exam.    Lungs:  Normal effort and normal respiratory rate.    Heart: Normal rate.  Regular rhythm.    Abdomen: Abdomen is soft.  Hypoactive bowel sounds.   There is generalized tenderness.     Extremities: Decreased range of motion.    Skin:  Warm and dry.              Labs:  Lab Results (last 72 hours)     Procedure Component Value Units Date/Time    POC Glucose Once [[id number]]  (Abnormal) Collected: 12/22/22 0719    Specimen: Blood Updated: 12/22/22 0812     Glucose 157 mg/dL      Comment: : [id number] Dakota [facility] Hospital[facility] ID: [id number]       POC Glucose Once [[id number]]  (Abnormal) Collected: 12/21/22 1650    Specimen: Blood Updated: 12/21/22 1701     Glucose 153 mg/dL      Comment: : [id number] Brenden CantonMeter ID: [id number]       POC Glucose Once [[id number]]  (Abnormal) Collected: 12/21/22 1102    Specimen: Blood Updated: 12/21/22 1113     Glucose 136 mg/dL      Comment: : [id number] Idania (Bone) McKittrickMeter  ID: [id number]       POC Glucose Once [[id number]]  (Normal) Collected: 12/21/22 0753    Specimen: Blood Updated: 12/21/22 0816     Glucose 122 mg/dL      Comment: : [id number] Brenden RowlandMeter ID: [id number]       POC Glucose Once [[id number]]  (Abnormal) Collected: 12/20/22 2005    Specimen: Blood Updated: 12/20/22 2016     Glucose 133 mg/dL      Comment: : [id number] Renetta LaresMeter ID: [id number]       POC Glucose Once [[id number]]  (Abnormal) Collected: 12/20/22 1701    Specimen: Blood Updated: 12/20/22 1713     Glucose 169 mg/dL      Comment: : [id number] Miranda Gross ID: [id number]       POC Glucose Once [[id number]]  (Abnormal) Collected: 12/20/22 1208    Specimen: Blood Updated: 12/20/22 1219     Glucose 330 mg/dL      Comment: : [id number] Miranda Gross ID: [id number]       Basic Metabolic Panel [[id number]]  (Abnormal) Collected: 12/20/22 0719    Specimen: Blood Updated: 12/20/22 0757     Glucose 146 mg/dL      BUN 17 mg/dL      Creatinine 0.96 mg/dL      Sodium 141 mmol/L      Potassium 4.1 mmol/L      Chloride 102 mmol/L      CO2 28.0 mmol/L      Calcium 9.2 mg/dL      BUN/Creatinine Ratio 17.7     Anion Gap 11.0 mmol/L      eGFR 89.9 mL/min/1.73      Comment: National Kidney Foundation and American Society of Nephrology (ASN) Task Force recommended calculation based on the Chronic Kidney Disease Epidemiology Collaboration (CKD-EPI) equation refit without adjustment for race.       Narrative:      GFR Normal >60  Chronic Kidney Disease <60  Kidney Failure <15      CBC & Differential [[id number]]  (Abnormal) Collected: 12/20/22 0719    Specimen: Blood Updated: 12/20/22 0734    Narrative:      The following orders were created for panel order CBC & Differential.  Procedure                               Abnormality         Status                     ---------                               -----------         ------                     CBC Auto Differential[[id number]]        Abnormal             Final result                 Please view results for these tests on the individual orders.    CBC Auto Differential [[id number]]  (Abnormal) Collected: 12/20/22 0719    Specimen: Blood Updated: 12/20/22 0734     WBC 11.12 10*3/mm3      RBC 4.43 10*6/mm3      Hemoglobin 13.9 g/dL      Hematocrit 41.1 %      MCV 92.8 fL      MCH 31.4 pg      MCHC 33.8 g/dL      RDW 11.9 %      RDW-SD 40.8 fl      MPV 11.0 fL      Platelets 222 10*3/mm3      Neutrophil % 76.6 %      Lymphocyte % 13.6 %      Monocyte % 7.8 %      Eosinophil % 1.3 %      Basophil % 0.3 %      Immature Grans % 0.4 %      Neutrophils, Absolute 8.53 10*3/mm3      Lymphocytes, Absolute 1.51 10*3/mm3      Monocytes, Absolute 0.87 10*3/mm3      Eosinophils, Absolute 0.14 10*3/mm3      Basophils, Absolute 0.03 10*3/mm3      Immature Grans, Absolute 0.04 10*3/mm3      nRBC 0.0 /100 WBC     POC Glucose Once [[id number]]  (Abnormal) Collected: 12/19/22 1304    Specimen: Blood Updated: 12/19/22 1315     Glucose 139 mg/dL      Comment: : [id number] Prema Martinezamando ID: [id number]       POC Glucose Once [[id number]]  (Normal) Collected: 12/19/22 0851    Specimen: Blood Updated: 12/19/22 0902     Glucose 106 mg/dL      Comment: : [id number] Kaileydavid JadeeMeter ID: [id number]             Imaging Results (Last 72 Hours)     Procedure Component Value Units Date/Time    XR Spine Lumbar AP & Lateral [[id number]] Resulted: 12/23/22 0909     Updated: 12/23/22 0909    FL C Arm During Surgery [[id number]] Resulted: 12/23/22 0909     Updated: 12/23/22 0909    XR Spine Lumbar AP & Lateral [[id number]] Collected: 12/21/22 1053     Updated: 12/21/22 1057    Narrative:      XR SPINE LUMBAR AP AND LATERAL- 12/21/2022 9:14 AM CST     HISTORY: fusion; M54.50-Low back pain, unspecified; G89.29-Other chronic  pain; Z74.09-Other reduced mobility     COMPARISON: None     FLUOROSCOPY TIME: 59 seconds     FLUOROSCOPY DOSE: 51.297 mGy     NUMBER OF IMAGES: 4       Impression:          Intraoperative fluoroscopic images during lateral fusion at the L4/L5  level.     Please refer to the operative note for more details.  This report was finalized on 12/21/2022 10:54 by Dr. Steffi Fraser MD.    FL C Arm During Surgery [[id number]] Resulted: 12/21/22 1045     Updated: 12/21/22 1045    Narrative:      This procedure was auto-finalized with no dictation required.                Assessment:    Condition: In stable condition.  Improving.       Plan:   Discharge home.  (    Obstipation/constipation-stimulate with Dulcolax suppository and/or milk of mag this morning.  Limited oral intake.  Continue with MiraLAX.  Third and final step of surgery today.    For pain control- will need pain medications prior to getting up with physical therapy today.  Left lower extremity weakness is improving.    Type 2 diabetes-blood sugar stable.    If he does well with therapy and left lower extremity continues to progress along can be discharged home if cleared by spinal surgery.    Patient is stable for discharge home if pain gets under better control.  Follow-up with his primary care doctor 1-2 weeks for blood pressure.  Suspect his systolic blood pressure is up today secondary to poor pain control.    Explained to patient and staff that we were consulted for medical management during their acute care hospitalization. The Medical Consultation was requested by the Attending Physician. The patient has been recommended to f/u with their regular primary care provider concerning any further treatment and review of abnormalities found during their hospitalization at [facility].They agree with the treatment plan as well as understand our role in their hospitalization. All questions were encouraged and answered to best of my ability.).     Problem List:     Lumbago of multiple sites in spine with sciatica    Lumbosacral disc disease    Chronic bilateral low back pain without sciatica    Type 2 diabetes mellitus  with hyperglycemia, without long-term current use of insulin (HCC)    Essential hypertension    Drug-induced constipation    Adolph Chao MD  12/23/2022                                                  Electronically signed by Adolph Chao MD at 12/23/22 0936     Michael Wallace PA at 12/22/22 1738     Attestation signed by CHARITO Garcia MD at 12/23/22 0648    I have reviewed this documentation and agree.                  [facility]  Spine Surgery  TING Whelan   Progress Note        Subjective/Overnight Events:  No acute issues overnight, pain controlled, voiding, weakness in LLE noted    Vitals  Vitals:    12/22/22 0404 12/22/22 0745 12/22/22 1102 12/22/22 1504   BP: 137/61 153/68 133/57 106/61   BP Location: Left arm Left arm Left arm    Patient Position: Lying Lying Lying    Pulse: 90 85 87 86   Resp: 18 18 18    Temp: 99.2 °F (37.3 °C) 98.1 °F (36.7 °C) 99.2 °F (37.3 °C) 99.3 °F (37.4 °C)   TempSrc: Oral Oral Oral Axillary   SpO2: 98% 100% 96% 94%   Weight:       Height:           Current Facility-Administered Medications   Medication Dose Route Frequency Provider Last Rate Last Admin   • acetaminophen (TYLENOL) tablet 650 mg  650 mg Oral Q4H PRN CHARITO Garcia MD   650 mg at 12/22/22 1455   • atorvastatin (LIPITOR) tablet 20 mg  20 mg Oral Daily CHARITO Garcia MD   20 mg at 12/22/22 0848   • atropine sulfate injection 0.5 mg  0.5 mg Intravenous Once PRN CHARITO Garcia MD       • bisacodyl (DULCOLAX) suppository 10 mg  10 mg Rectal Daily PRN Adolph Chao MD       • [START ON 12/23/2022] clindamycin (CLEOCIN) 900 mg in dextrose 5% 50 mL IVPB (premix)  900 mg Intravenous Once Michael Wallace PA       • cloNIDine (CATAPRES) tablet 0.1 mg  0.1 mg Oral TID CHARITO Garcia MD   0.1 mg at 12/22/22 1504   • cyclobenzaprine (FLEXERIL) tablet 10 mg  10 mg Oral TID PRN CHARITO Garcia MD   10 mg at  12/22/22 0427   • dextrose (D50W) (25 g/50 mL) IV injection 25 g  25 g Intravenous Q15 Min PRN CHARITO Garcia MD       • dextrose (GLUTOSE) oral gel 15 g  15 g Oral Q15 Min PRN CHARITO Garcia MD       • fentaNYL citrate (PF) (SUBLIMAZE) injection 25 mcg  25 mcg Intravenous Q5 Min PRN CHARITO Garcia MD       • flumazenil (ROMAZICON) injection 0.2 mg  0.2 mg Intravenous PRN CHARITO Garcia MD       • gabapentin (NEURONTIN) capsule 300 mg  300 mg Oral TID CHARITO Garcia MD   300 mg at 12/22/22 1505   • glucagon (human recombinant) (GLUCAGEN DIAGNOSTIC) injection 1 mg  1 mg Intramuscular Q15 Min PRN CHARITO Garcia MD       • hydroCHLOROthiazide (HYDRODIURIL) tablet 12.5 mg  12.5 mg Oral Daily CHARIOT Garcia MD   12.5 mg at 12/22/22 0848   • HYDROmorphone (DILAUDID) injection 0.5 mg  0.5 mg Intravenous Q10 Min PRN CHARITO Garcia MD   0.5 mg at 12/21/22 1202   • HYDROmorphone (DILAUDID) injection 1 mg  1 mg Intravenous Q3H PRN CHARITO Garcia MD        And   • naloxone (NARCAN) injection 0.4 mg  0.4 mg Intravenous Q5 Min PRN CHARITO Garcia MD       • Insulin Lispro (humaLOG) injection 2-7 Units  2-7 Units Subcutaneous TID AC CHARITO Garcia MD   2 Units at 12/22/22 1217   • labetalol (NORMODYNE,TRANDATE) injection 5 mg  5 mg Intravenous Q5 Min PRN CHARITO Garcia MD       • losartan (COZAAR) tablet 25 mg  25 mg Oral Daily CHARITO Garcia MD   25 mg at 12/22/22 0848   • magnesium hydroxide (MILK OF MAGNESIA) suspension 10 mL  10 mL Oral Daily PRN Adolph Chao MD       • metFORMIN (GLUCOPHAGE) tablet 1,000 mg  1,000 mg Oral BID With Meals CHARITO Garcia MD   1,000 mg at 12/22/22 1723   • naloxone (NARCAN) injection 0.04 mg  0.04 mg Intravenous PRN CHARITO Garcia MD       • ondansetron (ZOFRAN) tablet 4 mg  4 mg Oral Q6H PRN CHARITO Garcia MD        Or   • ondansetron (ZOFRAN) injection 4 mg  4 mg Intravenous Q6H PRN CHARITO Garcia MD    4 mg at 12/22/22 0853   • ondansetron (ZOFRAN) injection 4 mg  4 mg Intravenous Q15 Min PRN CHARITO Garcia MD       • oxyCODONE-acetaminophen (PERCOCET)  MG per tablet 1 tablet  1 tablet Oral Once PRN CHARITO Garcia MD       • oxyCODONE-acetaminophen (PERCOCET) 7.5-325 MG per tablet 1 tablet  1 tablet Oral Q4H PRN CHARITO Garcia MD   1 tablet at 12/22/22 1254   • oxyCODONE-acetaminophen (PERCOCET) 7.5-325 MG per tablet 2 tablet  2 tablet Oral Q4H PRN CHARITO Garcia MD   2 tablet at 12/22/22 0423   • polyethylene glycol (MIRALAX) packet 17 g  17 g Oral BID Adolph Chao MD   17 g at 12/22/22 0900   • sennosides-docusate (PERICOLACE) 8.6-50 MG per tablet 1 tablet  1 tablet Oral BID CHARITO Garcia MD   1 tablet at 12/22/22 0848   • sodium chloride 0.9 % flush 10 mL  10 mL Intravenous PRN CHARITO Garcia MD   10 mL at 12/19/22 2013   • sodium chloride 0.9 % flush 3 mL  3 mL Intravenous Q12H CHARITO Garcia MD   3 mL at 12/22/22 0848   • sodium chloride 0.9 % infusion 40 mL  40 mL Intravenous PRN CHARITO Garcia MD           PHYSICAL EXAM:    Orientation:  alert and oriented to person, place, and time    Incision:  no significant drainage    Upper Extremity Motor :  5/5 bilaterally    Upper Motor Neuron Signs:  none     Lower Extremity Motor :  LLE weakness, RLE intact    Lower Extremity Sensory:  Tibial nerve: LLE weakness, Superficial peroneal nerve: Intact and Deep peroneal nerve: Intact    Flatus:  flatus    ABNORMAL EXAM FINDINGS:  LLE weakness    LABS:    Lab Results (last 24 hours)     Procedure Component Value Units Date/Time    POC Glucose Once [[id number]]  (Normal) Collected: 12/22/22 1621    Specimen: Blood Updated: 12/22/22 1632     Glucose 117 mg/dL      Comment: : [id number] [facility] [id number]       POC Glucose Once [[id number]]  (Abnormal) Collected: 12/22/22 1107    Specimen: Blood Updated: 12/22/22 1128     Glucose 175 mg/dL       Comment: : [id number] Dakota BreehaMeter ID: [id number]       POC Glucose Once [[id number]]  (Abnormal) Collected: 12/22/22 0719    Specimen: Blood Updated: 12/22/22 0812     Glucose 157 mg/dL      Comment: : [id number] Dakota BreehaMeter ID: [id number]             ASSESSMENT AND PLAN:    Post operative day 3 & 1 status post ALIF L5/S1 & LLIF L4/5    1:  Activity Level:  Up with PT/OT  2:  Pain Control:  Continue current   3:  Discharge Planning:  Pending completion of next stage  4:  Other:  NPO after midnight, ABX on call to OR      Electronically signed by TING Whelan 12/22/2022 17:39 CST              Electronically signed by CHARITO Garcia MD at 12/23/22 0648     Adolph Chao MD at 12/22/22 0829          Manuel Moscoso is a 62 y.o. male patient.      Pain out of control.  No bowel movement since admission.  Patient looks uncomfortable this morning.      Current Facility-Administered Medications   Medication Dose Route Frequency Provider Last Rate Last Admin   • acetaminophen (TYLENOL) tablet 650 mg  650 mg Oral Q4H PRN CHARITO Garcia MD       • atorvastatin (LIPITOR) tablet 20 mg  20 mg Oral Daily CHARITO Garcia MD   20 mg at 12/21/22 1255   • atropine sulfate injection 0.5 mg  0.5 mg Intravenous Once PRN CHARITO Garcia MD       • cloNIDine (CATAPRES) tablet 0.1 mg  0.1 mg Oral TID CHARITO Garcia MD   0.1 mg at 12/21/22 2156   • cyclobenzaprine (FLEXERIL) tablet 10 mg  10 mg Oral TID PRN CHARITO Garcia MD   10 mg at 12/22/22 0427   • dextrose (D50W) (25 g/50 mL) IV injection 25 g  25 g Intravenous Q15 Min PRN CHARITO Garcia MD       • dextrose (GLUTOSE) oral gel 15 g  15 g Oral Q15 Min PRN CHARITO Garcia MD       • fentaNYL citrate (PF) (SUBLIMAZE) injection 25 mcg  25 mcg Intravenous Q5 Min PRN CHARITO Garcia MD       • flumazenil (ROMAZICON) injection 0.2 mg  0.2 mg Intravenous PRN CHARITO Garcia MD       • gabapentin  (NEURONTIN) capsule 300 mg  300 mg Oral TID CHARITO Garcia MD   300 mg at 12/21/22 2318   • glucagon (human recombinant) (GLUCAGEN DIAGNOSTIC) injection 1 mg  1 mg Intramuscular Q15 Min PRN CHARITO Garcia MD       • hydroCHLOROthiazide (HYDRODIURIL) tablet 12.5 mg  12.5 mg Oral Daily CHARITO Garcia MD   12.5 mg at 12/21/22 1258   • HYDROmorphone (DILAUDID) injection 0.5 mg  0.5 mg Intravenous Q10 Min PRN CHARITO Garcia MD   0.5 mg at 12/21/22 1202   • HYDROmorphone (DILAUDID) injection 1 mg  1 mg Intravenous Q3H PRN CHARITO Garcia MD        And   • naloxone (NARCAN) injection 0.4 mg  0.4 mg Intravenous Q5 Min PRN CHARITO Garcia MD       • Insulin Lispro (humaLOG) injection 2-7 Units  2-7 Units Subcutaneous TID AC CHARITO Garcia MD   2 Units at 12/21/22 1652   • labetalol (NORMODYNE,TRANDATE) injection 5 mg  5 mg Intravenous Q5 Min PRN CHARITO Garcia MD       • lactated ringers infusion 1,000 mL  1,000 mL Intravenous Continuous CHARITO Garcia MD   Stopped at 12/21/22 1249   • lactated ringers infusion  100 mL/hr Intravenous Continuous CHARITO Garcia MD   Stopped at 12/21/22 1249   • losartan (COZAAR) tablet 25 mg  25 mg Oral Daily CHARITO Garcia MD   25 mg at 12/21/22 1257   • metFORMIN (GLUCOPHAGE) tablet 1,000 mg  1,000 mg Oral BID With Meals CHARITO Garcia MD   1,000 mg at 12/21/22 1701   • naloxone (NARCAN) injection 0.04 mg  0.04 mg Intravenous PRN CHARITO Garcia MD       • ondansetron (ZOFRAN) tablet 4 mg  4 mg Oral Q6H PRN CHARITO Garcia MD        Or   • ondansetron (ZOFRAN) injection 4 mg  4 mg Intravenous Q6H PRN CHARITO Garcia MD       • ondansetron (ZOFRAN) injection 4 mg  4 mg Intravenous Q15 Min PRN CHARIOT Garcia MD       • oxyCODONE-acetaminophen (PERCOCET)  MG per tablet 1 tablet  1 tablet Oral Once PRN CHARITO Garcia MD       • oxyCODONE-acetaminophen (PERCOCET) 7.5-325 MG per tablet 1 tablet  1 tablet Oral Q4H  PRN CHARITO Garcia MD   1 tablet at 12/21/22 2156   • oxyCODONE-acetaminophen (PERCOCET) 7.5-325 MG per tablet 2 tablet  2 tablet Oral Q4H PRN CHARITO Garcia MD   2 tablet at 12/22/22 0423   • polyethylene glycol (MIRALAX) packet 17 g  17 g Oral Daily PRN CHARITO Garcia MD       • sennosides-docusate (PERICOLACE) 8.6-50 MG per tablet 1 tablet  1 tablet Oral BID CHARITO Garcia MD   1 tablet at 12/21/22 2156   • sodium chloride 0.9 % flush 10 mL  10 mL Intravenous PRN CHARITO Garcia MD   10 mL at 12/19/22 2013   • sodium chloride 0.9 % flush 3 mL  3 mL Intravenous Q12H CHARITO Garcia MD   3 mL at 12/21/22 2156   • sodium chloride 0.9 % infusion 40 mL  40 mL Intravenous PRN CHARITO Garcia MD       • sodium chloride 0.9 % infusion  100 mL/hr Intravenous Continuous CHARITO Garcia  mL/hr at 12/21/22 2320 100 mL/hr at 12/21/22 2320     ALLERGIES:    Allergies   Allergen Reactions   • Alpha-Gal Nausea And Vomiting and GI Intolerance     Red meat allergy   • Penicillins Other (See Comments)     STATES HE \"HAS NO IDEA\" AND THAT IT WAS A CHILDHOOD ALLERGY       Lumbago of multiple sites in spine with sciatica    Lumbosacral disc disease    Chronic bilateral low back pain without sciatica    Type 2 diabetes mellitus with hyperglycemia, without long-term current use of insulin ([facility])    Essential hypertension    Drug-induced constipation    Blood pressure 153/68, pulse 85, temperature 98.1 °F (36.7 °C), temperature source Oral, resp. rate 18, height 175.3 cm (69\"), weight 101 kg (223 lb 9.6 oz), SpO2 100 %.      Subjective:  Symptoms:  Stable.    Diet:  Poor intake.    Activity level: Impaired due to pain.    Pain:  He complains of pain that is severe.  He reports pain is unchanged.  Pain is partially controlled.      Review of Systems  Objective:  General Appearance:  Comfortable, well-appearing and in distress (From pain).    Vital signs: (most recent): Blood pressure 153/68, pulse  85, temperature 98.1 °F (36.7 °C), temperature source Oral, resp. rate 18, height 175.3 cm (69\"), weight 101 kg (223 lb 9.6 oz), SpO2 100 %.  Vital signs are normal.    Output: Producing urine and minimal stool output.    HEENT: Normal HEENT exam.    Lungs:  Normal effort and normal respiratory rate.    Heart: Normal rate.  Regular rhythm.    Abdomen: Abdomen is soft.  Hypoactive bowel sounds.   There is generalized tenderness.     Extremities: Decreased range of motion.    Skin:  Warm and dry.              Labs:  Lab Results (last 72 hours)     Procedure Component Value Units Date/Time    POC Glucose Once [[id number]]  (Abnormal) Collected: 12/22/22 0719    Specimen: Blood Updated: 12/22/22 0812     Glucose 157 mg/dL      Comment: : [id number] Dakota PeraltaaMeter ID: [id number]       POC Glucose Once [[id number]]  (Abnormal) Collected: 12/21/22 1650    Specimen: Blood Updated: 12/21/22 1701     Glucose 153 mg/dL      Comment: : [id number] Brenden RowlandMeter ID: [id number]       POC Glucose Once [[id number]]  (Abnormal) Collected: 12/21/22 1102    Specimen: Blood Updated: 12/21/22 1113     Glucose 136 mg/dL      Comment: : [id number] Idania (Bone) ShannonMeter ID: [id number]       POC Glucose Once [[id number]]  (Normal) Collected: 12/21/22 0753    Specimen: Blood Updated: 12/21/22 0816     Glucose 122 mg/dL      Comment: : [id number] Brenden MorganMeter ID: [id number]       POC Glucose Once [[id number]]  (Abnormal) Collected: 12/20/22 2005    Specimen: Blood Updated: 12/20/22 2016     Glucose 133 mg/dL      Comment: : [id number] Renetta LaresMeter ID: [id number]       POC Glucose Once [[id number]]  (Abnormal) Collected: 12/20/22 1701    Specimen: Blood Updated: 12/20/22 1713     Glucose 169 mg/dL      Comment: : [id number] Miranda Gross ID: [id number]       POC Glucose Once [[id number]]  (Abnormal) Collected: 12/20/22 1208    Specimen: Blood Updated: 12/20/22 1219     Glucose 330 mg/dL      Comment:  : [id number] Miranda Gross ID: [id number]       Basic Metabolic Panel [[id number]]  (Abnormal) Collected: 12/20/22 0719    Specimen: Blood Updated: 12/20/22 0757     Glucose 146 mg/dL      BUN 17 mg/dL      Creatinine 0.96 mg/dL      Sodium 141 mmol/L      Potassium 4.1 mmol/L      Chloride 102 mmol/L      CO2 28.0 mmol/L      Calcium 9.2 mg/dL      BUN/Creatinine Ratio 17.7     Anion Gap 11.0 mmol/L      eGFR 89.9 mL/min/1.73      Comment: National Kidney Foundation and American Society of Nephrology (ASN) Task Force recommended calculation based on the Chronic Kidney Disease Epidemiology Collaboration (CKD-EPI) equation refit without adjustment for race.       Narrative:      GFR Normal >60  Chronic Kidney Disease <60  Kidney Failure <15      CBC & Differential [[id number]]  (Abnormal) Collected: 12/20/22 0719    Specimen: Blood Updated: 12/20/22 0734    Narrative:      The following orders were created for panel order CBC & Differential.  Procedure                               Abnormality         Status                     ---------                               -----------         ------                     CBC Auto Differential[[id number]]        Abnormal            Final result                 Please view results for these tests on the individual orders.    CBC Auto Differential [[id number]]  (Abnormal) Collected: 12/20/22 0719    Specimen: Blood Updated: 12/20/22 0734     WBC 11.12 10*3/mm3      RBC 4.43 10*6/mm3      Hemoglobin 13.9 g/dL      Hematocrit 41.1 %      MCV 92.8 fL      MCH 31.4 pg      MCHC 33.8 g/dL      RDW 11.9 %      RDW-SD 40.8 fl      MPV 11.0 fL      Platelets 222 10*3/mm3      Neutrophil % 76.6 %      Lymphocyte % 13.6 %      Monocyte % 7.8 %      Eosinophil % 1.3 %      Basophil % 0.3 %      Immature Grans % 0.4 %      Neutrophils, Absolute 8.53 10*3/mm3      Lymphocytes, Absolute 1.51 10*3/mm3      Monocytes, Absolute 0.87 10*3/mm3      Eosinophils, Absolute 0.14 10*3/mm3       Basophils, Absolute 0.03 10*3/mm3      Immature Grans, Absolute 0.04 10*3/mm3      nRBC 0.0 /100 WBC     POC Glucose Once [[id number]]  (Abnormal) Collected: 12/19/22 1304    Specimen: Blood Updated: 12/19/22 1315     Glucose 139 mg/dL      Comment: : [id number] Prema Orellana ID: [id number]       POC Glucose Once [[id number]]  (Normal) Collected: 12/19/22 0851    Specimen: Blood Updated: 12/19/22 0902     Glucose 106 mg/dL      Comment: : [id number] Florence Cross ID: [id number]             Imaging Results (Last 72 Hours)     Procedure Component Value Units Date/Time    XR Spine Lumbar AP & Lateral [[id number]] Collected: 12/21/22 1053     Updated: 12/21/22 1057    Narrative:      XR SPINE LUMBAR AP AND LATERAL- 12/21/2022 9:14 AM CST     HISTORY: fusion; M54.50-Low back pain, unspecified; G89.29-Other chronic  pain; Z74.09-Other reduced mobility     COMPARISON: None     FLUOROSCOPY TIME: 59 seconds     FLUOROSCOPY DOSE: 51.297 mGy     NUMBER OF IMAGES: 4       Impression:         Intraoperative fluoroscopic images during lateral fusion at the L4/L5  level.     Please refer to the operative note for more details.  This report was finalized on 12/21/2022 10:54 by Dr. Steffi Fraser MD.    FL C Arm During Surgery [[id number]] Resulted: 12/21/22 1045     Updated: 12/21/22 1045    Narrative:      This procedure was auto-finalized with no dictation required.    XR Abdomen KUB [[id number]] Collected: 12/19/22 1316     Updated: 12/19/22 1320    Narrative:      EXAM/TECHNIQUE: XR ABDOMEN KUB-     INDICATION: no count in or     COMPARISON: None available.     FINDINGS:     Postoperative change of L5-S1 anterior fusion without evidence of  complication. No unexpected radiopaque foreign body. LEFT L4-L5 hardware  is also present. Multilevel lumbar spine degenerative change.  Nonobstructive bowel gas pattern.       Impression:         No unexpected radiopaque foreign body.  This report was finalized on 12/19/2022 13:17  by Dr. Demetris Lomas MD.    XR Spine Lumbar AP & Lateral [[id number]] Collected: 12/19/22 1316     Updated: 12/19/22 1319    Narrative:      XR SPINE LUMBAR AP AND LATERAL- 12/19/2022 1:00 PM CST     HISTORY: alif     COMPARISON: None     FLUOROSCOPY TIME: 11.2 seconds     FLUOROSCOPY DOSE: 10.0 mGy     NUMBER OF IMAGES: 7       Impression:         Intraoperative fluoroscopic images during lumbar fusion.     Please refer to the operative note for more details.   This report was finalized on 12/19/2022 13:16 by Dr. Demetris Lomas MD.    FL C Arm During Surgery [[id number]] Resulted: 12/19/22 1305     Updated: 12/19/22 1305    Narrative:      This procedure was auto-finalized with no dictation required.                Assessment:    Condition: In stable condition.  Improving.       Plan:   Discharge home.  (    Obstipation/constipation-stimulate with Dulcolax suppository and/or milk of mag this morning.  Limited oral intake.  Continue with MiraLAX.    For pain control- will need pain medications prior to getting up with physical therapy today.    Type 2 diabetes-blood sugar stable.    Patient is stable for discharge home if pain gets under better control.  Follow-up with his primary care doctor 1-2 weeks for blood pressure.  Suspect his systolic blood pressure is up today secondary to poor pain control.    Explained to patient and staff that we were consulted for medical management during their acute care hospitalization. The Medical Consultation was requested by the Attending Physician. The patient has been recommended to f/u with their regular primary care provider concerning any further treatment and review of abnormalities found during their hospitalization at [facility].They agree with the treatment plan as well as understand our role in their hospitalization. All questions were encouraged and answered to best of my ability.).     Problem List:     Lumbago of multiple sites in spine with sciatica     Lumbosacral disc disease    Chronic bilateral low back pain without sciatica    Type 2 diabetes mellitus with hyperglycemia, without long-term current use of insulin (HCC)    Essential hypertension    Drug-induced constipation    Adolph Chao MD  12/22/2022                                                  Electronically signed by Adolph Chao MD at 12/22/22 0810       Consult Notes (last 48 hours)  Notes from 12/21/22 1407 through 12/23/22 1407   No notes of this type exist for this encounter.

## 2022-12-23 NOTE — PROGRESS NOTES
Manuel Moscoso is a 62 y.o. male patient.      Pain out of control.  Plan for third and final step of surgery today.  Still with left leg weakness but improving per physical therapy notes.      Current Facility-Administered Medications   Medication Dose Route Frequency Provider Last Rate Last Admin   • [MAR Hold] acetaminophen (TYLENOL) tablet 650 mg  650 mg Oral Q4H PRN CHARITO Garcia MD   650 mg at 12/22/22 1455   • [MAR Hold] atorvastatin (LIPITOR) tablet 20 mg  20 mg Oral Daily CHARITO Garcia MD   20 mg at 12/22/22 0848   • [MAR Hold] atropine sulfate injection 0.5 mg  0.5 mg Intravenous Once PRN CHARITO Garcia MD       • atropine sulfate injection 0.5 mg  0.5 mg Intravenous Once PRN Miracle White MD       • [MAR Hold] bisacodyl (DULCOLAX) suppository 10 mg  10 mg Rectal Daily PRN Adolph Chao MD       • cloNIDine (CATAPRES) tablet 0.1 mg  0.1 mg Oral TID CHARITO Garcia MD   0.1 mg at 12/22/22 2005   • [MAR Hold] cyclobenzaprine (FLEXERIL) tablet 10 mg  10 mg Oral TID PRN CHARITO Garcia MD   10 mg at 12/22/22 2011   • [MAR Hold] dextrose (D50W) (25 g/50 mL) IV injection 25 g  25 g Intravenous Q15 Min PRN CHARITO Garcia MD       • [MAR Hold] dextrose (GLUTOSE) oral gel 15 g  15 g Oral Q15 Min PRN CHARITO Garcia MD       • droperidol (INAPSINE) injection 0.625 mg  0.625 mg Intravenous Once PRN Miracle White MD        Or   • droperidol (INAPSINE) injection 0.625 mg  0.625 mg Intramuscular Once PRN Miracle White MD       • [MAR Hold] fentaNYL citrate (PF) (SUBLIMAZE) injection 25 mcg  25 mcg Intravenous Q5 Min PRN CHARITO Garcia MD       • fentaNYL citrate (PF) (SUBLIMAZE) injection 25 mcg  25 mcg Intravenous Q5 Min PRN Miracle White MD       • [MAR Hold] flumazenil (ROMAZICON) injection 0.2 mg  0.2 mg Intravenous PRN CHARITO Garcia MD       • flumazenil (ROMAZICON) injection 0.2 mg  0.2 mg Intravenous PRN Miracle White,  MD       • gabapentin (NEURONTIN) capsule 300 mg  300 mg Oral TID CHARITO Garcia MD   300 mg at 12/22/22 2006   • [MAR Hold] glucagon (human recombinant) (GLUCAGEN DIAGNOSTIC) injection 1 mg  1 mg Intramuscular Q15 Min PRN CHARITO Garcia MD       • [MAR Hold] hydroCHLOROthiazide (HYDRODIURIL) tablet 12.5 mg  12.5 mg Oral Daily CHARITO Garcia MD   12.5 mg at 12/22/22 0848   • [MAR Hold] HYDROmorphone (DILAUDID) injection 0.5 mg  0.5 mg Intravenous Q10 Min PRN CHARITO Garcia MD   0.5 mg at 12/21/22 1202   • HYDROmorphone (DILAUDID) injection 0.5 mg  0.5 mg Intravenous Q10 Min PRN Miracle White MD       • [MAR Hold] HYDROmorphone (DILAUDID) injection 1 mg  1 mg Intravenous Q3H PRN CHARITO Garcia MD   1 mg at 12/23/22 0514    And   • [MAR Hold] naloxone (NARCAN) injection 0.4 mg  0.4 mg Intravenous Q5 Min PRN CHARITO Garcia MD       • ibuprofen (ADVIL,MOTRIN) tablet 600 mg  600 mg Oral Once PRN Miracle White MD       • [MAR Hold] Insulin Lispro (humaLOG) injection 2-7 Units  2-7 Units Subcutaneous TID AC CHARITO Garcia MD   2 Units at 12/22/22 1217   • labetalol (NORMODYNE,TRANDATE) injection 5 mg  5 mg Intravenous Q5 Min PRN CHARITO Garcia MD       • labetalol (NORMODYNE,TRANDATE) injection 5 mg  5 mg Intravenous Q5 Min PRN Miracle White MD       • lactated ringers infusion  100 mL/hr Intravenous Continuous PRN CHARITO Garcia  mL/hr at 12/23/22 0729 Restarted at 12/23/22 0835   • lactated ringers infusion  100 mL/hr Intravenous Continuous Miracle White  mL/hr at 12/23/22 0729 Restarted at 12/23/22 0835   • losartan (COZAAR) tablet 25 mg  25 mg Oral Daily CHARITO Garcia MD   25 mg at 12/22/22 0848   • [MAR Hold] magnesium hydroxide (MILK OF MAGNESIA) suspension 10 mL  10 mL Oral Daily PRN Adolph Chao MD       • [MAR Hold] metFORMIN (GLUCOPHAGE) tablet 1,000 mg  1,000 mg Oral BID With Meals CHARITO Garcia MD    1,000 mg at 12/22/22 1723   • [MAR Hold] naloxone (NARCAN) injection 0.04 mg  0.04 mg Intravenous PRN CHARITO Garcia MD       • naloxone (NARCAN) injection 0.04 mg  0.04 mg Intravenous PRN Miracle White MD       • [MAR Hold] ondansetron (ZOFRAN) tablet 4 mg  4 mg Oral Q6H PRN CHARITO Garcia MD        Or   • [MAR Hold] ondansetron (ZOFRAN) injection 4 mg  4 mg Intravenous Q6H PRN CHARITO Garcia MD   4 mg at 12/22/22 0853   • [MAR Hold] ondansetron (ZOFRAN) injection 4 mg  4 mg Intravenous Q15 Min PRN CHARITO Garcia MD       • ondansetron (ZOFRAN) injection 4 mg  4 mg Intravenous Q15 Min PRN Miracle White MD       • [MAR Hold] oxyCODONE-acetaminophen (PERCOCET)  MG per tablet 1 tablet  1 tablet Oral Once PRN CHARITO Garcia MD       • oxyCODONE-acetaminophen (PERCOCET)  MG per tablet 1 tablet  1 tablet Oral Once PRN Miracle White MD       • [MAR Hold] oxyCODONE-acetaminophen (PERCOCET) 7.5-325 MG per tablet 1 tablet  1 tablet Oral Q4H PRN CHARITO Garcia MD   1 tablet at 12/22/22 1254   • [MAR Hold] oxyCODONE-acetaminophen (PERCOCET) 7.5-325 MG per tablet 2 tablet  2 tablet Oral Q4H PRN CHARITO Garcia MD   2 tablet at 12/22/22 2011   • [MAR Hold] polyethylene glycol (MIRALAX) packet 17 g  17 g Oral BID Adolph Chao MD   17 g at 12/22/22 2006   • [MAR Hold] sennosides-docusate (PERICOLACE) 8.6-50 MG per tablet 1 tablet  1 tablet Oral BID CHARITO Garcia MD   1 tablet at 12/22/22 2005   • [MAR Hold] sodium chloride 0.9 % flush 10 mL  10 mL Intravenous PRN CHARITO Garcia MD   10 mL at 12/19/22 2013   • [MAR Hold] sodium chloride 0.9 % flush 3 mL  3 mL Intravenous Q12H CHARITO Garcia MD   3 mL at 12/22/22 2008   • [MAR Hold] sodium chloride 0.9 % infusion 40 mL  40 mL Intravenous PRN CHARITO Garcia MD         Facility-Administered Medications Ordered in Other Encounters   Medication Dose Route Frequency Provider Last Rate Last  Admin   • dexmedetomidine (PRECEDEX) 20 mcg/5 mL Pediatric Syringe   Intravenous PRN Garnica, Melanie A, CRNA   20 mcg at 12/23/22 0732   • fentaNYL citrate (PF) (SUBLIMAZE) injection   Intravenous PRN Garnica, Melanie A, CRNA   100 mcg at 12/23/22 0732   • glycopyrrolate (ROBINUL) injection   Intravenous PRN Garnica, Melanie A, CRNA   0.4 mg at 12/23/22 0857   • ketamine hcl syringe solution prefilled syringe   Intravenous PRN Garnica, Melanie A, CRNA   50 mg at 12/23/22 0759   • lidocaine PF 2% (XYLOCAINE) injection   Intravenous PRN Garnica, Melanie A, CRNA   100 mg at 12/23/22 0732   • Neostigmine Methylsulfate syringe   Intravenous PRN Garnica, Melanie A, CRNA   3 mg at 12/23/22 0857   • ondansetron (ZOFRAN) injection   Intravenous PRN Garnica, Melanie A, CRNA   4 mg at 12/23/22 0810   • Phenylephrine HCl-NaCl 100 mcg/ml injection   Intravenous PRN Garnica, Melanie A, CRNA   100 mcg at 12/23/22 0840   • Propofol (DIPRIVAN) injection   Intravenous PRN Garnica, Melanie A, CRNA   150 mg at 12/23/22 0732   • rocuronium (ZEMURON) injection   Intravenous PRN Garnica, Melanie A, CRNA   35 mg at 12/23/22 0733   • vasopressin injection solution   Intravenous PRN Garnica, Melanie A, CRNA   0.5 mL at 12/23/22 0800     ALLERGIES:    Allergies   Allergen Reactions   • Alpha-Gal Nausea And Vomiting and GI Intolerance     Red meat allergy   • Penicillins Other (See Comments)     STATES HE \"HAS NO IDEA\" AND THAT IT WAS A CHILDHOOD ALLERGY       Lumbago of multiple sites in spine with sciatica    Lumbosacral disc disease    Chronic bilateral low back pain without sciatica    Type 2 diabetes mellitus with hyperglycemia, without long-term current use of insulin ([facility])    Essential hypertension    Drug-induced constipation    Blood pressure 114/71, pulse 92, temperature 98.3 °F (36.8 °C), temperature source Temporal, resp. rate 16, height 175.3 cm (69\"), weight 101 kg (223 lb 9.6 oz), SpO2 98  %.      Subjective:  Symptoms:  Stable.    Diet:  Poor intake.    Activity level: Impaired due to pain.    Pain:  He complains of pain that is severe.  He reports pain is unchanged.  Pain is partially controlled.      Review of Systems  Objective:  General Appearance:  Comfortable, well-appearing and in distress (From pain).    Vital signs: (most recent): Blood pressure 114/71, pulse 92, temperature 98.3 °F (36.8 °C), temperature source Temporal, resp. rate 16, height 175.3 cm (69\"), weight 101 kg (223 lb 9.6 oz), SpO2 98 %.  Vital signs are normal.    Output: Producing urine and minimal stool output.    HEENT: Normal HEENT exam.    Lungs:  Normal effort and normal respiratory rate.    Heart: Normal rate.  Regular rhythm.    Abdomen: Abdomen is soft.  Hypoactive bowel sounds.   There is generalized tenderness.     Extremities: Decreased range of motion.    Skin:  Warm and dry.              Labs:  Lab Results (last 72 hours)     Procedure Component Value Units Date/Time    POC Glucose Once [[id number]]  (Abnormal) Collected: 12/22/22 0719    Specimen: Blood Updated: 12/22/22 0812     Glucose 157 mg/dL      Comment: : [id number] Dakota ArrietaaMeter ID: [id number]       POC Glucose Once [[id number]]  (Abnormal) Collected: 12/21/22 1650    Specimen: Blood Updated: 12/21/22 1701     Glucose 153 mg/dL      Comment: : [id number] Brenden Raft [employer] ID: [id number]       POC Glucose Once [[id number]]  (Abnormal) Collected: 12/21/22 1102    Specimen: Blood Updated: 12/21/22 1113     Glucose 136 mg/dL      Comment: : [id number] Idania (Bone) ShannonMeter ID: [id number]       POC Glucose Once [[id number]]  (Normal) Collected: 12/21/22 0753    Specimen: Blood Updated: 12/21/22 0816     Glucose 122 mg/dL      Comment: : [id number] Brenden Raft [employer] ID: [id number]       POC Glucose Once [[id number]]  (Abnormal) Collected: 12/20/22 2005    Specimen: Blood Updated: 12/20/22 2016     Glucose 133 mg/dL      Comment: :  [id number] Renetta Richardson ID: [id number]       POC Glucose Once [[id number]]  (Abnormal) Collected: 12/20/22 1701    Specimen: Blood Updated: 12/20/22 1713     Glucose 169 mg/dL      Comment: : [id number] Miranda Gross ID: [id number]       POC Glucose Once [[id number]]  (Abnormal) Collected: 12/20/22 1208    Specimen: Blood Updated: 12/20/22 1219     Glucose 330 mg/dL      Comment: : [id number] Miradna Gross ID: [id number]       Basic Metabolic Panel [[id number]]  (Abnormal) Collected: 12/20/22 0719    Specimen: Blood Updated: 12/20/22 0757     Glucose 146 mg/dL      BUN 17 mg/dL      Creatinine 0.96 mg/dL      Sodium 141 mmol/L      Potassium 4.1 mmol/L      Chloride 102 mmol/L      CO2 28.0 mmol/L      Calcium 9.2 mg/dL      BUN/Creatinine Ratio 17.7     Anion Gap 11.0 mmol/L      eGFR 89.9 mL/min/1.73      Comment: National Kidney Foundation and American Society of Nephrology (ASN) Task Force recommended calculation based on the Chronic Kidney Disease Epidemiology Collaboration (CKD-EPI) equation refit without adjustment for race.       Narrative:      GFR Normal >60  Chronic Kidney Disease <60  Kidney Failure <15      CBC & Differential [[id number]]  (Abnormal) Collected: 12/20/22 0719    Specimen: Blood Updated: 12/20/22 0734    Narrative:      The following orders were created for panel order CBC & Differential.  Procedure                               Abnormality         Status                     ---------                               -----------         ------                     CBC Auto Differential[[id number]]        Abnormal            Final result                 Please view results for these tests on the individual orders.    CBC Auto Differential [[id number]]  (Abnormal) Collected: 12/20/22 0719    Specimen: Blood Updated: 12/20/22 0734     WBC 11.12 10*3/mm3      RBC 4.43 10*6/mm3      Hemoglobin 13.9 g/dL      Hematocrit 41.1 %      MCV 92.8 fL      MCH 31.4 pg      MCHC 33.8 g/dL      RDW  11.9 %      RDW-SD 40.8 fl      MPV 11.0 fL      Platelets 222 10*3/mm3      Neutrophil % 76.6 %      Lymphocyte % 13.6 %      Monocyte % 7.8 %      Eosinophil % 1.3 %      Basophil % 0.3 %      Immature Grans % 0.4 %      Neutrophils, Absolute 8.53 10*3/mm3      Lymphocytes, Absolute 1.51 10*3/mm3      Monocytes, Absolute 0.87 10*3/mm3      Eosinophils, Absolute 0.14 10*3/mm3      Basophils, Absolute 0.03 10*3/mm3      Immature Grans, Absolute 0.04 10*3/mm3      nRBC 0.0 /100 WBC     POC Glucose Once [[id number]]  (Abnormal) Collected: 12/19/22 1304    Specimen: Blood Updated: 12/19/22 1315     Glucose 139 mg/dL      Comment: : [id number] Prema Orellana ID: [id number]       POC Glucose Once [[id number]]  (Normal) Collected: 12/19/22 0851    Specimen: Blood Updated: 12/19/22 0902     Glucose 106 mg/dL      Comment: : [id number] Florence AnneMeter ID: [id number]             Imaging Results (Last 72 Hours)     Procedure Component Value Units Date/Time    XR Spine Lumbar AP & Lateral [[id number]] Resulted: 12/23/22 0909     Updated: 12/23/22 0909    FL C Arm During Surgery [[id number]] Resulted: 12/23/22 0909     Updated: 12/23/22 0909    XR Spine Lumbar AP & Lateral [[id number]] Collected: 12/21/22 1053     Updated: 12/21/22 1057    Narrative:      XR SPINE LUMBAR AP AND LATERAL- 12/21/2022 9:14 AM CST     HISTORY: fusion; M54.50-Low back pain, unspecified; G89.29-Other chronic  pain; Z74.09-Other reduced mobility     COMPARISON: None     FLUOROSCOPY TIME: 59 seconds     FLUOROSCOPY DOSE: 51.297 mGy     NUMBER OF IMAGES: 4       Impression:         Intraoperative fluoroscopic images during lateral fusion at the L4/L5  level.     Please refer to the operative note for more details.  This report was finalized on 12/21/2022 10:54 by Dr. Steffi Fraser MD.    FL C Arm During Surgery [[id number]] Resulted: 12/21/22 1045     Updated: 12/21/22 1045    Narrative:      This procedure was auto-finalized with no dictation  required.                Assessment:    Condition: In stable condition.  Improving.       Plan:   Discharge home.  (    Obstipation/constipation-stimulate with Dulcolax suppository and/or milk of mag this morning.  Limited oral intake.  Continue with MiraLAX.  Third and final step of surgery today.    For pain control- will need pain medications prior to getting up with physical therapy today.  Left lower extremity weakness is improving.    Type 2 diabetes-blood sugar stable.    If he does well with therapy and left lower extremity continues to progress along can be discharged home if cleared by spinal surgery.    Patient is stable for discharge home if pain gets under better control.  Follow-up with his primary care doctor 1-2 weeks for blood pressure.  Suspect his systolic blood pressure is up today secondary to poor pain control.    Explained to patient and staff that we were consulted for medical management during their acute care hospitalization. The Medical Consultation was requested by the Attending Physician. The patient has been recommended to f/u with their regular primary care provider concerning any further treatment and review of abnormalities found during their hospitalization at [facility].They agree with the treatment plan as well as understand our role in their hospitalization. All questions were encouraged and answered to best of my ability.).     Problem List:     Lumbago of multiple sites in spine with sciatica    Lumbosacral disc disease    Chronic bilateral low back pain without sciatica    Type 2 diabetes mellitus with hyperglycemia, without long-term current use of insulin (HCC)    Essential hypertension    Drug-induced constipation    Adolph Chao MD  12/23/2022

## 2022-12-23 NOTE — THERAPY PROGRESS REPORT/RE-CERT
Patient Name: Manuel Moscoso  : 1960    MRN: [id number]                              Today's Date: 2022       Admit Date: 2022    Visit Dx:     ICD-10-CM ICD-9-CM   1. Chronic bilateral low back pain without sciatica  M54.50 724.2    G89.29 338.29   2. Impaired mobility  Z74.09 799.89   3. Decreased activities of daily living (ADL)  Z78.9 V49.89     Patient Active Problem List   Diagnosis   • Lumbosacral disc disease   • Chronic bilateral low back pain without sciatica   • Lumbago of multiple sites in spine with sciatica   • Type 2 diabetes mellitus with hyperglycemia, without long-term current use of insulin (HCC)   • Essential hypertension   • Drug-induced constipation     Past Medical History:   Diagnosis Date   • Allergy to alpha-gal    • Arthritis    • Chronic bilateral low back pain without sciatica 2022   • Diabetes mellitus (HCC)    • Hearing loss, left    • History of staph infection    • Hypertension    • Low back pain    • Lumbosacral disc disease 2022   • Psoriasis    • Sleep apnea    • Tinnitus    • Vision disturbance     using rx eyedrops     Past Surgical History:   Procedure Laterality Date   • BACK SURGERY      x 2   • CHOLECYSTECTOMY     • COLONOSCOPY     • GANGLION CYST EXCISION Left     wrist   • LIPOMA EXCISION      back   • LUMBAR FUSION Left 2022    Procedure: LEFT LATERAL LUMBAR INTERBODY FUSION WITH INSTRUMENTATION L4-5;  Surgeon: CHARITO Garcia MD;  Location: [facility];  Service: Orthopedic Spine;  Laterality: Left;      General Information     Row Name 22 1125          OT Time and Intention    Document Type re-evaluation  Pt s/p PSF L4-S1.  -JJ     Mode of Treatment occupational therapy  -JJ     Row Name 22 1125          General Information    Patient Profile Reviewed yes  -JJ     Existing Precautions/Restrictions brace worn when out of bed;fall;LSO;spinal  -JJ     Row Name 22 1125          Safety Issues, Functional Mobility     Impairments Affecting Function (Mobility) balance;pain;strength;endurance/activity tolerance  -JJ           User Key  (r) = Recorded By, (t) = Taken By, (c) = Cosigned By    Initials Name Provider Type    Steffi Oliver OTR/L, CSRS Occupational Therapist                 Mobility/ADL's     Row Name 12/23/22 1125          Bed Mobility    Bed Mobility rolling right;sidelying-sit  -JJ     Rolling Right [place] (Bed Mobility) minimum assist (75% patient effort)  -JJ     Sidelying-Sit [place] (Bed Mobility) verbal cues;minimum assist (75% patient effort)  -JJ     Assistive Device (Bed Mobility) head of bed elevated;bed rails  -JJ     Comment, (Bed Mobility) required assistance with L LE.  -JJ     Row Name 12/23/22 1125          Transfers    Transfers sit-stand transfer;stand-sit transfer  -JJ     Row Name 12/23/22 1125          Sit-Stand Transfer    Sit-Stand [place] (Transfers) contact guard  -JJ     Assistive Device (Sit-Stand Transfers) walker, front-wheeled  -JJ     Row Name 12/23/22 1125          Stand-Sit Transfer    Stand-Sit [place] (Transfers) verbal cues;contact guard  -JJ     Assistive Device (Stand-Sit Transfers) walker, front-wheeled  -JJ     Row Name 12/23/22 1125          Functional Mobility    Functional Mobility- Ind. Level contact guard assist  -JJ     Functional Mobility- Device walker, front-wheeled  -JJ     Functional Mobility- Comment amb in hallway. No buckling observed with L LE.  -JJ     Row Name 12/23/22 1125          Activities of Daily Living    BADL Assessment/Intervention upper body dressing;lower body dressing  -JJ     Row Name 12/23/22 1125          Upper Body Dressing Assessment/Training    [place] Level (Upper Body Dressing) don;standby assist  -JJ     Position (Upper Body Dressing) edge of bed sitting  -JJ     Comment, (Upper Body Dressing) LSO  -JJ     Row Name 12/23/22 1125          Lower Body Dressing Assessment/Training    [place] Level (Lower  Body Dressing) don;pants/bottoms;moderate assist (50% patient effort)  -JJ     Position (Lower Body Dressing) edge of bed sitting  -JJ     Comment, (Lower Body Dressing) assistance to thread L foot through undergarments and pants.  -JJ           User Key  (r) = Recorded By, (t) = Taken By, (c) = Cosigned By    Initials Name Provider Type    Steffi Oliver OTR/L, NELLY Occupational Therapist               Obj/Interventions     Row Name 12/23/22 1125          Range of Motion Comprehensive    General Range of Motion bilateral upper extremity ROM WFL  -JJ     Row Name 12/23/22 1125          Strength Comprehensive (MMT)    Comment, General Manual Muscle Testing (MMT) Assessment B UE strength 5/5  -J     Row Name 12/23/22 1125          Balance    Balance Assessment sitting static balance;sitting dynamic balance;standing static balance;standing dynamic balance  -JJ     Static Sitting Balance independent  -JJ     Dynamic Sitting Balance independent  -JJ     Position, Sitting Balance unsupported;sitting in chair;sitting edge of bed  -JJ     Static Standing Balance contact guard  -JJ     Dynamic Standing Balance contact guard  -JJ     Position/Device Used, Standing Balance supported;walker, front-wheeled  -JJ           User Key  (r) = Recorded By, (t) = Taken By, (c) = Cosigned By    Initials Name Provider Type    Steffi Oliver OTR/L, NELLY Occupational Therapist               Goals/Plan     Row Name 12/23/22 1125          Transfer Goal 1 (OT)    Activity/Assistive Device (Transfer Goal 1, OT) bed-to-chair/chair-to-bed;toilet;shower chair;walker, rolling  -J     [place] Level/Cues Needed (Transfer Goal 1, OT) modified independence  -JJ     Time Frame (Transfer Goal 1, OT) long term goal (LTG);10 days  -JJ     Progress/Outcome (Transfer Goal 1, OT) goal ongoing;goal not met  -JJ     Row Name 12/23/22 1125          Bathing Goal 1 (OT)    Activity/Device (Bathing Goal 1, OT) lower body bathing  -JJ      [place] Level/Cues Needed (Bathing Goal 1, OT) minimum assist (75% or more patient effort)  -JJ     Time Frame (Bathing Goal 1, OT) long term goal (LTG);10 days  -JJ     Progress/Outcomes (Bathing Goal 1, OT) goal not met;goal ongoing  -     Row Name 12/23/22 1125          Dressing Goal 1 (OT)    Activity/Device (Dressing Goal 1, OT) lower body dressing  -JJ     [place]/Cues Needed (Dressing Goal 1, OT) contact guard required  -JJ     Time Frame (Dressing Goal 1, OT) long term goal (LTG);by discharge  -JJ     Progress/Outcome (Dressing Goal 1, OT) goal partially met;goal revised this date  -     Row Name 12/23/22 1125          Therapy Assessment/Plan (OT)    Planned Therapy Interventions (OT) adaptive equipment training;BADL retraining;functional balance retraining;occupation/activity based interventions;patient/caregiver education/training;orthotic fabrication/fitting/training;transfer/mobility retraining  -           User Key  (r) = Recorded By, (t) = Taken By, (c) = Cosigned By    Initials Name Provider Type    Steffi Lange, OTR/L, CSRS Occupational Therapist               Clinical Impression     Row Name 12/23/22 1125          Pain Assessment    Posttreatment Pain Rating 0/10 - no pain  -     Row Name 12/23/22 1125          Plan of Care Review    Plan of Care Reviewed With patient  -     Outcome Evaluation OT re-eval completed. Pt presents alert and oriented x4, resting comfortably in bed s/p PSF L4-S1. He was again educated on spinal precautions, LSO fitting/mgt/wear schedule. Required Min A for bed mobility d/t need for assistance with L LE. Mod A required for lower body dressing d/t L LE weakness. Set-up and SBA to don LSO. CGA for sit <> stand t/f from EOB and all functional mobility in hallway with rwx. At this time, pt would continue to benefit from skilled OT services to assist with return to PLOF. Recommend d/c home with family.  -     Row Name 12/23/22 1129           Therapy Assessment/Plan (OT)    Rehab Potential (OT) good, to achieve stated therapy goals  -     Criteria for Skilled Therapeutic Interventions Met (OT) yes;meets criteria;skilled treatment is necessary  -     Therapy Frequency (OT) 5 times/wk  -     Predicted Duration of Therapy Intervention (OT) 10 days  -     Row Name 12/23/22 1125          Therapy Plan Review/Discharge Plan (OT)    Anticipated Discharge Disposition (OT) home with assist  -     Row Name 12/23/22 1125          Positioning and Restraints    Pre-Treatment Position in bed  -     Post Treatment Position chair  -     In Chair notified nsg;sitting;call light within reach;encouraged to call for assist;with brace  -           User Key  (r) = Recorded By, (t) = Taken By, (c) = Cosigned By    Initials Name Provider Type    Steffi Oliver OTR/L, CSRS Occupational Therapist               Outcome Measures     Row Name 12/23/22 1125          How much help from another is currently needed...    Putting on and taking off regular lower body clothing? 2  -JJ     Bathing (including washing, rinsing, and drying) 3  -JJ     Toileting (which includes using toilet bed pan or urinal) 3  -JJ     Putting on and taking off regular upper body clothing 4  -JJ     Taking care of personal grooming (such as brushing teeth) 4  -JJ     Eating meals 4  -JJ     AM-PAC 6 Clicks Score (OT) 20  -     Row Name 12/23/22 1125          Functional Assessment    Outcome Measure Options AM-PAC 6 Clicks Daily Activity (OT)  -           User Key  (r) = Recorded By, (t) = Taken By, (c) = Cosigned By    Initials Name Provider Type    Steffi Oliver OTR/L, NELLY Occupational Therapist                Occupational Therapy Education     Title: PT OT SLP Therapies (In Progress)     Topic: Occupational Therapy (In Progress)     Point: ADL training (Done)     Description:   Instruct learner(s) on proper safety adaptation and remediation techniques during self care or  transfers.   Instruct in proper use of assistive devices.              Learning Progress Summary           Patient Acceptance, E, VU by  at 12/23/2022 1208    Acceptance, E,TB,D, VU,DU by  at 12/22/2022 0859    Acceptance, E, VU by  at 12/19/2022 1533                   Point: Home exercise program (Not Started)     Description:   Instruct learner(s) on appropriate technique for monitoring, assisting and/or progressing therapeutic exercises/activities.              Learner Progress:  Not documented in this visit.          Point: Precautions (Done)     Description:   Instruct learner(s) on prescribed precautions during self-care and functional transfers.              Learning Progress Summary           Patient Acceptance, E, VU by TRISHA at 12/23/2022 1208    Acceptance, E,TB,D, VU,DU by  at 12/22/2022 0859    Acceptance, E, VU by  at 12/19/2022 1533                   Point: Body mechanics (Done)     Description:   Instruct learner(s) on proper positioning and spine alignment during self-care, functional mobility activities and/or exercises.              Learning Progress Summary           Patient Acceptance, E,TB,D, VU,DU by  at 12/22/2022 0859                               User Key     Initials Effective Dates Name Provider Type Discipline     04/09/19 -  Abilio Castillo, OTR/L, CNT Occupational Therapist OT     11/10/21 -  Steffi Tenorio OTR/L, CSRS Occupational Therapist OT              OT Recommendation and Plan  Planned Therapy Interventions (OT): adaptive equipment training, BADL retraining, functional balance retraining, occupation/activity based interventions, patient/caregiver education/training, orthotic fabrication/fitting/training, transfer/mobility retraining  Therapy Frequency (OT): 5 times/wk  Plan of Care Review  Plan of Care Reviewed With: patient  Outcome Evaluation: OT re-eval completed. Pt presents alert and oriented x4, resting comfortably in bed s/p PSF L4-S1. He was again  educated on spinal precautions, LSO fitting/mgt/wear schedule. Required Min A for bed mobility d/t need for assistance with L LE. Mod A required for lower body dressing d/t L LE weakness. Set-up and SBA to don LSO. CGA for sit <> stand t/f from EOB and all functional mobility in hallway with rwx. At this time, pt would continue to benefit from skilled OT services to assist with return to PLOF. Recommend d/c home with family.     Time Calculation:    Time Calculation- OT     Row Name 12/23/22 1125             Time Calculation- OT    OT Start Time 1125  -      OT Stop Time 1204  -      OT Time Calculation (min) 39 min  -      Total Timed Code Minutes- OT 9 minute(s)  -      OT Received On 12/23/22  -      OT Goal Re-Cert Due Date 01/02/23  -            User Key  (r) = Recorded By, (t) = Taken By, (c) = Cosigned By    Initials Name Provider Type    Steffi Oliver OTR/L, CSRS Occupational Therapist              Therapy Charges for Today     Code Description Service Date Service Provider Modifiers Qty    35476262984 HC OT RE-EVAL 2 12/23/2022 Steffi Tenorio OTR/L, NELLY GO 1    [id number]  OT SELF CARE/MGMT/TRAIN EA 15 MIN 12/23/2022 Steffi Tenorio OTR/L, [place] GO 1               Steffi Tenorio, OTR/L, CSRS  12/23/2022

## 2022-12-23 NOTE — THERAPY RE-EVALUATION
Patient Name: Manuel Moscoso  : 1960    MRN: [id number]                              Today's Date: 2022       Admit Date: 2022    Visit Dx:     ICD-10-CM ICD-9-CM   1. Chronic bilateral low back pain without sciatica  M54.50 724.2    G89.29 338.29   2. Impaired mobility  Z74.09 799.89   3. Decreased activities of daily living (ADL)  Z78.9 V49.89     Patient Active Problem List   Diagnosis   • Lumbosacral disc disease   • Chronic bilateral low back pain without sciatica   • Lumbago of multiple sites in spine with sciatica   • Type 2 diabetes mellitus with hyperglycemia, without long-term current use of insulin (HCC)   • Essential hypertension   • Drug-induced constipation     Past Medical History:   Diagnosis Date   • Allergy to alpha-gal    • Arthritis    • Chronic bilateral low back pain without sciatica 2022   • Diabetes mellitus (HCC)    • Hearing loss, left    • History of staph infection    • Hypertension    • Low back pain    • Lumbosacral disc disease 2022   • Psoriasis    • Sleep apnea    • Tinnitus    • Vision disturbance     using rx eyedrops     Past Surgical History:   Procedure Laterality Date   • BACK SURGERY      x 2   • CHOLECYSTECTOMY     • COLONOSCOPY     • GANGLION CYST EXCISION Left     wrist   • LIPOMA EXCISION      back   • LUMBAR FUSION Left 2022    Procedure: LEFT LATERAL LUMBAR INTERBODY FUSION WITH INSTRUMENTATION L4-5;  Surgeon: CHARITO Garcia MD;  Location: [facility];  Service: Orthopedic Spine;  Laterality: Left;      General Information     Row Name 22 1120          Physical Therapy Time and Intention    Document Type re-evaluation  s/p  PSF L4-S1.  -DOUG     Mode of Treatment physical therapy  -DOUG     Row Name 22 1120          General Information    Patient Profile Reviewed yes  -DOUG     Existing Precautions/Restrictions brace worn when out of bed;fall;LSO;spinal  -DOUG     Barriers to Rehab physical barrier  -DOUG     Row Name  12/23/22 1120          Cognition    Orientation Status (Cognition) oriented x 4  -DOUG     Row Name 12/23/22 1120          Safety Issues, Functional Mobility    Impairments Affecting Function (Mobility) pain;strength  -DOUG           User Key  (r) = Recorded By, (t) = Taken By, (c) = Cosigned By    Initials Name Provider Type    Rakesh Pacheco, PT DPT Physical Therapist               Mobility     Row Name 12/23/22 1120          Bed Mobility    Bed Mobility sidelying-sit;sit-sidelying  -DOUG     Sidelying-Sit [place] (Bed Mobility) minimum assist (75% patient effort)  assist with L LE  -DOUG     Sit-Sidelying [place] (Bed Mobility) not tested  -DOUG     Assistive Device (Bed Mobility) head of bed elevated;bed rails  -DOUG     Row Name 12/23/22 1120          Sit-Stand Transfer    Sit-Stand [place] (Transfers) contact guard  -DOUG     Assistive Device (Sit-Stand Transfers) walker, front-wheeled  -DOUG     Row Name 12/23/22 1120          Gait/Stairs (Locomotion)    [place] Level (Gait) contact guard  -DOUG     Assistive Device (Gait) walker, front-wheeled  -DOUG     Distance in Feet (Gait) 200 ft  -DOUG     Deviations/Abnormal Patterns (Gait) left sided deviations;antalgic  -DOUG     Comment, (Gait/Stairs) continues with left hip pain/weakness since 2nd part back surgery  -DOUG           User Key  (r) = Recorded By, (t) = Taken By, (c) = Cosigned By    Initials Name Provider Type    Rakesh Pacheco, PT DPT Physical Therapist               Obj/Interventions     Row Name 12/23/22 1120          Range of Motion Comprehensive    Comment, General Range of Motion R LE AROM WFL, L ankle AROM WFL, L knee AROM impaired ~ 15%, L hip flexion AROM impaired ~ 50% (pain, weakness)  -DOUG     Row Name 12/23/22 1120          Strength Comprehensive (MMT)    Comment, General Manual Muscle Testing (MMT) Assessment R LE grossly 4+/5, L hip flexion grossly 2+/5, L knee grossly 3+/5. L ankle 4/5  -DOUG     Row Name 12/23/22 1120           Balance    Balance Assessment sitting dynamic balance;standing dynamic balance  -DOUG     Dynamic Sitting Balance independent  -DOUG     Position, Sitting Balance unsupported;sitting in chair;sitting edge of bed  -DOUG     Dynamic Standing Balance contact guard  -DOUG     Position/Device Used, Standing Balance walker, rolling  -DOUG     Row Name 12/23/22 1120          Sensory Assessment (Somatosensory)    Sensory Assessment (Somatosensory) LE sensation intact  -DOUG           User Key  (r) = Recorded By, (t) = Taken By, (c) = Cosigned By    Initials Name Provider Type    Rakesh Pacheco, PT DPT Physical Therapist               Goals/Plan     Row Name 12/23/22 1120          Bed Mobility Goal 1 (PT)    Activity/Assistive Device (Bed Mobility Goal 1, PT) bed mobility activities, all  -DOUG     [place] Level/Cues Needed (Bed Mobility Goal 1, PT) independent  -DOUG     Time Frame (Bed Mobility Goal 1, PT) long term goal (LTG);by discharge  -DOUG     Progress/Outcomes (Bed Mobility Goal 1, PT) goal not met;goal ongoing  -DOUG     Row Name 12/23/22 1120          Transfer Goal 1 (PT)    Activity/Assistive Device (Transfer Goal 1, PT) sit-to-stand/stand-to-sit;bed-to-chair/chair-to-bed;walker, rolling  -DOUG     [place] Level/Cues Needed (Transfer Goal 1, PT) independent  -DOUG     Time Frame (Transfer Goal 1, PT) long term goal (LTG);10 days  -DOUG     Progress/Outcome (Transfer Goal 1, PT) goal not met;goal ongoing  -DOUG     Row Name 12/23/22 1120          Gait Training Goal 1 (PT)    Activity/Assistive Device (Gait Training Goal 1, PT) gait (walking locomotion);decrease fall risk;improve balance and speed;increase endurance/gait distance;walker, rolling  -DOUG     [place] Level (Gait Training Goal 1, PT) modified independence  -DOUG     Distance (Gait Training Goal 1, PT) 200ft with L heel strike  -DOUG     Time Frame (Gait Training Goal 1, PT) long term goal (LTG);10 days  -DOUG     Progress/Outcome (Gait Training Goal 1, PT) good  progress toward goal;goal ongoing  -DOUG     Row Name 12/23/22 1120          Stairs Goal 1 (PT)    Activity/Assistive Device (Stairs Goal 1, PT) ascending stairs;descending stairs  -DOUG     [place] Level/Cues Needed (Stairs Goal 1, PT) supervision required  -DOUG     Number of Stairs (Stairs Goal 1, PT) 5 steps  -DOUG     Time Frame (Stairs Goal 1, PT) long term goal (LTG);10 days  -DOUG     Progress/Outcome (Stairs Goal 1, PT) goal not met;goal ongoing  -DOUG     Row Name 12/23/22 1120          Therapy Assessment/Plan (PT)    Planned Therapy Interventions (PT) bed mobility training;transfer training;gait training;balance training;home exercise program;patient/family education;postural re-education;stair training;strengthening;orthotic fitting/training;neuromuscular re-education  -DOUG           User Key  (r) = Recorded By, (t) = Taken By, (c) = Cosigned By    Initials Name Provider Type    DOUG Rakesh Cheung, PT DPT Physical Therapist               Clinical Impression     Row Name 12/23/22 1120          Pain    Pain Intervention(s) Repositioned;Ambulation/increased activity  -DOUG     Additional Documentation Pain Scale: FACES Pre/Post-Treatment (Group)  -DOUG     Row Name 12/23/22 1120          Pain Scale: FACES Pre/Post-Treatment    Pain: FACES Scale, Pretreatment 4-->hurts little more  -DOUG     Pain Location - Side/Orientation Left  -DOUG     Pain Location incisional  -DOUG     Pain Location - back  -DOUG     Row Name 12/23/22 1120          Plan of Care Review    Plan of Care Reviewed With patient  -DOUG     Progress improving  -DOUG     Outcome Evaluation PT completed re-eval s/p his 3rd and final part back surgery. He continues with L hip/back pain and weakness that was present after his 2nd part back surgery Wednesday. He was able to ambulates today with an antalgic gait in L LE, but he reports this is an improvement since yesterday. No L LE buckling or LOB with gait today. He ambulates 200 ft with CGA and RW use. PT will cont  with POC. He is hopeful to d.c home with family tomorrow.  -DOUG     Row Name 12/23/22 1120          Therapy Assessment/Plan (PT)    Patient/Family Therapy Goals Statement (PT) Strengthen L LE  -DOUG     Rehab Potential (PT) good, to achieve stated therapy goals  -DOUG     Criteria for Skilled Interventions Met (PT) yes;meets criteria;skilled treatment is necessary  -DOUG     Therapy Frequency (PT) 2 times/day  -DOUG     Predicted Duration of Therapy Intervention (PT) until d/c  -DOUG     Row Name 12/23/22 1120          Vital Signs    O2 Delivery Intra Treatment room air  -DOUG     Post SpO2 (%) 99  -DOUG     O2 Delivery Post Treatment room air  -DOUG     Pre Patient Position Supine  -DOUG     Intra Patient Position Standing  -DOUG     Post Patient Position Sitting  -DOUG     Row Name 12/23/22 1120          Positioning and Restraints    Pre-Treatment Position in bed  -DOUG     Post Treatment Position chair  -DOUG     In Chair notified nsg;sitting;call light within reach;encouraged to call for assist  -DOUG           User Key  (r) = Recorded By, (t) = Taken By, (c) = Cosigned By    Initials Name Provider Type    Rakesh Pacheco, PT DPT Physical Therapist               Outcome Measures     Row Name 12/23/22 1120          How much help from another person do you currently need...    Turning from your back to your side while in flat bed without using bedrails? 3  -DOUG     Moving from lying on back to sitting on the side of a flat bed without bedrails? 3  -DOUG     Moving to and from a bed to a chair (including a wheelchair)? 3  -DOUG     Standing up from a chair using your arms (e.g., wheelchair, bedside chair)? 3  -DOUG     Climbing 3-5 steps with a railing? 3  -DOUG     To walk in hospital room? 3  -DOUG     AM-PAC 6 Clicks Score (PT) 18  -DOUG     Highest level of mobility 6 --> Walked 10 steps or more  -DOUG     Row Name 12/23/22 1125 12/23/22 1120       Functional Assessment    Outcome Measure Options AM-PAC 6 Clicks Daily Activity (OT)  -JJ AM-PAC 6  Clicks Basic Mobility (PT)  -DOUG          User Key  (r) = Recorded By, (t) = Taken By, (c) = Cosigned By    Initials Name Provider Type    Rakesh Pacheco, PT DPT Physical Therapist    Steffi Oliver, OTR/L, CSRS Occupational Therapist                             Physical Therapy Education     Title: PT OT SLP Therapies (In Progress)     Topic: Physical Therapy (In Progress)     Point: Mobility training (Done)     Learning Progress Summary           Patient Acceptance, E, VU,DU,NR by DOUG at 12/23/2022 1120    Comment: back prec, LSO use/radha/doff., progression of PT    Acceptance, E, VU by MS at 12/21/2022 1557    Comment: role of PT in his care, spinal restrictions    Acceptance, E, NR by DOUG at 12/19/2022 1449    Comment: Benefits of activity, progression of PT, back precautions, LSO use/radha/doff                   Point: Home exercise program (Not Started)     Learner Progress:  Not documented in this visit.          Point: Body mechanics (Not Started)     Learner Progress:  Not documented in this visit.          Point: Precautions (Done)     Learning Progress Summary           Patient Acceptance, E, VU,DU,NR by DOUG at 12/23/2022 1120    Comment: back prec, LSO use/radha/doff., progression of PT    Acceptance, E, VU by MS at 12/21/2022 1557    Comment: role of PT in his care, spinal restrictions    Acceptance, E, NR by DOUG at 12/19/2022 1449    Comment: Benefits of activity, progression of PT, back precautions, LSO use/radha/doff                               User Key     Initials Effective Dates Name Provider Type Discipline    DOUG 08/02/16 -  Rakesh Cheung PT DPT Physical Therapist PT    MS 06/19/18 -  Chantelle Willson PT, DPT, NCS Physical Therapist PT              PT Recommendation and Plan  Planned Therapy Interventions (PT): bed mobility training, transfer training, gait training, balance training, home exercise program, patient/family education, postural re-education, stair training,  strengthening, orthotic fitting/training, neuromuscular re-education  Plan of Care Reviewed With: patient  Progress: improving  Outcome Evaluation: PT completed re-eval s/p his 3rd and final part back surgery. He continues with L hip/back pain and weakness that was present after his 2nd part back surgery Wednesday. He was able to ambulates today with an antalgic gait in L LE, but he reports this is an improvement since yesterday. No L LE buckling or LOB with gait today. He ambulates 200 ft with CGA and RW use. PT will cont with POC. He is hopeful to d.c home with family tomorrow.     Time Calculation:    PT Charges     Row Name 12/23/22 1227             Time Calculation    Start Time 1120  -DOUG      Stop Time 1150  -DOUG      Time Calculation (min) 30 min  -DOUG      PT Received On 12/23/22  -DOUG      PT Goal Re-Cert Due Date 01/02/23  -DOUG            User Key  (r) = Recorded By, (t) = Taken By, (c) = Cosigned By    Initials Name Provider Type    Rakesh Pacheco, PT DPT Physical Therapist              Therapy Charges for Today     Code Description Service Date Service Provider Modifiers Qty    [id number]  PT RE-EVAL ESTABLISHED PLAN 2 12/23/2022 Rakesh Cheung PT DPT GP 1          PT G-Codes  Outcome Measure Options: AM-PAC 6 Clicks Daily Activity (OT)  AM-PAC 6 Clicks Score (PT): 18  AM-PAC 6 Clicks Score (OT): 20  PT Discharge Summary  Anticipated Discharge Disposition (PT): home with assist, home with outpatient therapy services (outpatient PT if needed.)    Rakesh Cheung, PT DPT  12/23/2022

## 2022-12-23 NOTE — DISCHARGE PLACEMENT REQUEST
Maegan Moscoso (62 y.o. Male)     Date of Birth   1960    Social Security Number       Address   [address] [place] [address] [address] [place] [address]    Home Phone   [phone]    MRN   [id number]       Yarsani   Other    Marital Status                               Admission Date   12/19/22    Admission Type   Elective    Admitting Provider   CHARITO Garcia MD    Attending Provider   CHARITO Garcia MD    Department, Room/Bed   [facility] 3A, 328/1       Discharge Date       Discharge Disposition       Discharge Destination                               Attending Provider: CHARITO Garcia MD    Allergies: Alpha-gal, Penicillins    Isolation: None   Infection: None   Code Status: CPR    Ht: 175.3 cm (69\")   Wt: 101 kg (223 lb 9.6 oz)    Admission Cmt: None   Principal Problem: Lumbago of multiple sites in spine with sciatica [M54.40]                 Active Insurance as of 12/19/2022     Primary Coverage     Payor Plan Insurance Group Employer/Plan Group    [facility] [employer] [facility] [employer] [facility] [id number]     Payor Plan Address Payor Plan Phone Number Payor Plan Fax Number Effective Dates    [address] [phone]  3/1/2021 - None Entered    [facility] [id number]       Subscriber Name Subscriber Birth Date Member ID       MAEGAN MOSCOSO 1960 [id number]                 Emergency Contacts      (Rel.) Home Phone Work Phone Mobile Phone    HECTOR MOSCOSO (Friend) -- -- [phone]

## 2022-12-23 NOTE — ANESTHESIA PROCEDURE NOTES
Airway  Urgency: elective    Date/Time: 12/23/2022 7:35 AM  Airway not difficult    General Information and Staff    Patient location during procedure: OR  CRNA/CAA: Melanie Garnica CRNA    Indications and Patient Condition  Indications for airway management: airway protection    Preoxygenated: yes  Mask difficulty assessment: 1 - vent by mask    Final Airway Details  Final airway type: endotracheal airway      Successful airway: ETT  Cuffed: yes   Successful intubation technique: direct laryngoscopy and video laryngoscopy  Facilitating devices/methods: intubating stylet  Endotracheal tube insertion site: oral  Blade: Grimaldo  Blade size: 4  ETT size (mm): 7.5  Cormack-Lehane Classification: grade I - full view of glottis  Placement verified by: chest auscultation and capnometry   Cuff volume (mL): 6  Measured from: lips  ETT/EBT  to lips (cm): 23  Number of attempts at approach: 1  Assessment: lips, teeth, and gum same as pre-op and atraumatic intubation

## 2022-12-23 NOTE — PLAN OF CARE
Goal Outcome Evaluation:  Plan of Care Reviewed With: patient        Progress: no change  Outcome Evaluation: No neurovascular changes.  Pt has denied pain so far this shift.  Sleeping between care.  NPO since midnight for Part 3 surgery today.  Robinson DANIELS.  RINA weak.

## 2022-12-23 NOTE — PLAN OF CARE
Goal Outcome Evaluation:  Plan of Care Reviewed With: patient           Outcome Evaluation: OT re-eval completed. Pt presents alert and oriented x4, resting comfortably in bed s/p PSF L4-S1. He was again educated on spinal precautions, LSO fitting/mgt/wear schedule. Required Min A for bed mobility d/t need for assistance with L LE. Mod A required for lower body dressing d/t L LE weakness. Set-up and SBA to don LSO. CGA for sit <> stand t/f from EOB and all functional mobility in hallway with rwx. At this time, pt would continue to benefit from skilled OT services to assist with return to PLOF. Recommend d/c home with family.

## 2022-12-24 ENCOUNTER — READMISSION MANAGEMENT (OUTPATIENT)
Dept: CALL CENTER | Facility: HOSPITAL | Age: 62
End: 2022-12-24

## 2022-12-24 VITALS
DIASTOLIC BLOOD PRESSURE: 71 MMHG | HEART RATE: 79 BPM | TEMPERATURE: 98.9 F | HEIGHT: 69 IN | RESPIRATION RATE: 18 BRPM | OXYGEN SATURATION: 98 % | SYSTOLIC BLOOD PRESSURE: 128 MMHG | BODY MASS INDEX: 33.12 KG/M2 | WEIGHT: 223.6 LBS

## 2022-12-24 LAB
GLUCOSE BLDC GLUCOMTR-MCNC: 109 MG/DL (ref 70–130)
GLUCOSE BLDC GLUCOMTR-MCNC: 145 MG/DL (ref 70–130)

## 2022-12-24 PROCEDURE — 82962 GLUCOSE BLOOD TEST: CPT

## 2022-12-24 PROCEDURE — 97116 GAIT TRAINING THERAPY: CPT

## 2022-12-24 PROCEDURE — 97535 SELF CARE MNGMENT TRAINING: CPT

## 2022-12-24 PROCEDURE — 0 HYDROMORPHONE 1 MG/ML SOLUTION: Performed by: ORTHOPAEDIC SURGERY

## 2022-12-24 PROCEDURE — 97110 THERAPEUTIC EXERCISES: CPT

## 2022-12-24 RX ORDER — ONDANSETRON 4 MG/1
4 TABLET, FILM COATED ORAL EVERY 8 HOURS PRN
Qty: 45 TABLET | Refills: 1 | Status: SHIPPED | OUTPATIENT
Start: 2022-12-24

## 2022-12-24 RX ORDER — OXYCODONE AND ACETAMINOPHEN 10; 325 MG/1; MG/1
1 TABLET ORAL EVERY 6 HOURS PRN
Qty: 60 TABLET | Refills: 0 | Status: SHIPPED | OUTPATIENT
Start: 2022-12-24 | End: 2023-01-08

## 2022-12-24 RX ADMIN — CLONIDINE HYDROCHLORIDE 0.1 MG: 0.1 TABLET ORAL at 08:57

## 2022-12-24 RX ADMIN — SODIUM CHLORIDE, SODIUM LACTATE, POTASSIUM CHLORIDE, AND CALCIUM CHLORIDE 100 ML/HR: 600; 310; 30; 20 INJECTION, SOLUTION INTRAVENOUS at 05:28

## 2022-12-24 RX ADMIN — METFORMIN HYDROCHLORIDE 1000 MG: 500 TABLET ORAL at 08:57

## 2022-12-24 RX ADMIN — HYDROMORPHONE HYDROCHLORIDE 1 MG: 1 INJECTION, SOLUTION INTRAMUSCULAR; INTRAVENOUS; SUBCUTANEOUS at 05:26

## 2022-12-24 RX ADMIN — HYDROMORPHONE HYDROCHLORIDE 1 MG: 1 INJECTION, SOLUTION INTRAMUSCULAR; INTRAVENOUS; SUBCUTANEOUS at 12:37

## 2022-12-24 RX ADMIN — GABAPENTIN 300 MG: 300 CAPSULE ORAL at 08:58

## 2022-12-24 RX ADMIN — HYDROMORPHONE HYDROCHLORIDE 1 MG: 1 INJECTION, SOLUTION INTRAMUSCULAR; INTRAVENOUS; SUBCUTANEOUS at 08:58

## 2022-12-24 RX ADMIN — HYDROCHLOROTHIAZIDE 12.5 MG: 25 TABLET ORAL at 12:38

## 2022-12-24 RX ADMIN — CYCLOBENZAPRINE 10 MG: 10 TABLET, FILM COATED ORAL at 09:03

## 2022-12-24 RX ADMIN — POLYETHYLENE GLYCOL 3350 17 G: 17 POWDER, FOR SOLUTION ORAL at 08:57

## 2022-12-24 RX ADMIN — Medication 3 ML: at 09:00

## 2022-12-24 RX ADMIN — DOCUSATE SODIUM 50 MG AND SENNOSIDES 8.6 MG 1 TABLET: 8.6; 5 TABLET, FILM COATED ORAL at 08:58

## 2022-12-24 RX ADMIN — ATORVASTATIN CALCIUM 20 MG: 10 TABLET, FILM COATED ORAL at 08:58

## 2022-12-24 RX ADMIN — HYDROMORPHONE HYDROCHLORIDE 1 MG: 1 INJECTION, SOLUTION INTRAMUSCULAR; INTRAVENOUS; SUBCUTANEOUS at 00:38

## 2022-12-24 RX ADMIN — LOSARTAN POTASSIUM 25 MG: 50 TABLET, FILM COATED ORAL at 08:57

## 2022-12-24 RX ADMIN — OXYCODONE HYDROCHLORIDE AND ACETAMINOPHEN 2 TABLET: 7.5; 325 TABLET ORAL at 09:03

## 2022-12-24 RX ADMIN — OXYCODONE HYDROCHLORIDE AND ACETAMINOPHEN 1 TABLET: 7.5; 325 TABLET ORAL at 02:46

## 2022-12-24 RX ADMIN — OXYCODONE HYDROCHLORIDE AND ACETAMINOPHEN 2 TABLET: 7.5; 325 TABLET ORAL at 13:31

## 2022-12-24 NOTE — PLAN OF CARE
Problem: Adult Inpatient Plan of Care  Goal: Plan of Care Review  Outcome: Ongoing, Progressing  Flowsheets (Taken 12/24/2022 0421)  Progress: no change  Plan of Care Reviewed With: patient  Outcome Evaluation: Pt is A&Ox4. PRn pain meds given with relief noted. Dressings are CDI. Denies any N/T. VSS. Safety maintained.

## 2022-12-24 NOTE — THERAPY DISCHARGE NOTE
Acute Care - Physical Therapy Discharge Summary  [facility]       Patient Name: Manuel Moscoso  : 1960  MRN: [id number]    Today's Date: 2022  Onset of Illness/Injury or Date of Surgery: 22       Referring Physician: Dr. Garcia      Admit Date: 2022      PT Recommendation and Plan    Visit Dx:    ICD-10-CM ICD-9-CM   1. Lumbago of multiple sites in spine with sciatica  M54.40 724.2     724.3   2. Chronic bilateral low back pain without sciatica  M54.50 724.2    G89.29 338.29   3. Impaired mobility  Z74.09 799.89   4. Decreased activities of daily living (ADL)  Z78.9 V49.89   5. Lumbosacral disc disease  M51.9 722.93        Outcome Measures     Row Name 22 1000 22 0800          How much help from another person do you currently need...    Turning from your back to your side while in flat bed without using bedrails? 3  -KJ --     Moving from lying on back to sitting on the side of a flat bed without bedrails? 3  -KJ --     Moving to and from a bed to a chair (including a wheelchair)? 3  -KJ --     Standing up from a chair using your arms (e.g., wheelchair, bedside chair)? 3  -KJ --     Climbing 3-5 steps with a railing? 3  -KJ --     To walk in hospital room? 3  -KJ --     AM-PAC 6 Clicks Score (PT) 18  -KJ --        How much help from another is currently needed...    Putting on and taking off regular lower body clothing? -- 2  -AC     Bathing (including washing, rinsing, and drying) -- 3  -AC     Toileting (which includes using toilet bed pan or urinal) -- 3  -AC     Putting on and taking off regular upper body clothing -- 4  -AC     Taking care of personal grooming (such as brushing teeth) -- 4  -AC     Eating meals -- 4  -AC     AM-PAC 6 Clicks Score (OT) -- 20  -AC        Functional Assessment    Outcome Measure Options AM-PAC 6 Clicks Basic Mobility (PT)  -KJ AM-PAC 6 Clicks Daily Activity (OT)  -AC           User Key  (r) = Recorded By, (t) = Taken By, (c) = Cosigned  By    Initials Name Provider Type    AC Abilio Castillo N, OTR/L, CNT Occupational Therapist    Wendy Dawson, PTA Physical Therapist Assistant                 PT Charges     Row Name 12/24/22 0725             Time Calculation    Start Time 0725  -AB      Stop Time 0748  -AB      Time Calculation (min) 23 min  -AB      PT Received On 12/24/22  -AB         Time Calculation- PT    Total Timed Code Minutes- PT 23 minute(s)  -AB            User Key  (r) = Recorded By, (t) = Taken By, (c) = Cosigned By    Initials Name Provider Type    AB Tracy Fuller, PTA Physical Therapist Assistant                 PT Rehab Goals     Row Name 12/24/22 1542             Bed Mobility Goal 1 (PT)    Activity/Assistive Device (Bed Mobility Goal 1, PT) bed mobility activities, all  -JAMAL      [place] Level/Cues Needed (Bed Mobility Goal 1, PT) independent  -JAMAL      Time Frame (Bed Mobility Goal 1, PT) long term goal (LTG);by discharge  -JAMAL      Progress/Outcomes (Bed Mobility Goal 1, PT) goal not met  -JAMAL         Transfer Goal 1 (PT)    Activity/Assistive Device (Transfer Goal 1, PT) sit-to-stand/stand-to-sit;bed-to-chair/chair-to-bed;walker, rolling  -JAMAL      [place] Level/Cues Needed (Transfer Goal 1, PT) independent  -JAMAL      Time Frame (Transfer Goal 1, PT) long term goal (LTG);10 days  -JAMAL      Progress/Outcome (Transfer Goal 1, PT) goal not met  -JAMAL         Gait Training Goal 1 (PT)    Activity/Assistive Device (Gait Training Goal 1, PT) gait (walking locomotion);decrease fall risk;improve balance and speed;increase endurance/gait distance;walker, rolling  -JAMAL      [place] Level (Gait Training Goal 1, PT) modified independence  -JAMAL      Distance (Gait Training Goal 1, PT) 200ft with L heel strike  -JAMAL      Time Frame (Gait Training Goal 1, PT) long term goal (LTG);10 days  -JAMAL      Progress/Outcome (Gait Training Goal 1, PT) goal not met  -JAMAL         Stairs Goal 1 (PT)    Activity/Assistive Device (Stairs Goal 1,  PT) ascending stairs;descending stairs  -JAMAL      [place] Level/Cues Needed (Stairs Goal 1, PT) supervision required  -JAMAL      Number of Stairs (Stairs Goal 1, PT) 5 steps  -JAMAL      Time Frame (Stairs Goal 1, PT) long term goal (LTG);10 days  -JAMAL      Progress/Outcome (Stairs Goal 1, PT) goal not met  -JAMAL            User Key  (r) = Recorded By, (t) = Taken By, (c) = Cosigned By    Initials Name Provider Type Discipline    Anthony Ludwig, PTA Physical Therapist Assistant PT                    PT Discharge Summary  Anticipated Discharge Disposition (PT): home with assist  Reason for Discharge: Discharge from facility  Outcomes Achieved: Refer to plan of care for updates on goals achieved  Discharge Destination: Home with assist      Anthony Ramirez PTA   12/24/2022

## 2022-12-24 NOTE — THERAPY TREATMENT NOTE
Acute Care - Physical Therapy Treatment Note  [facility]     Patient Name: Manuel Moscoso  : 1960  MRN: [id number]  Today's Date: 2022   Onset of Illness/Injury or Date of Surgery: 22  Visit Dx:     ICD-10-CM ICD-9-CM   1. Lumbago of multiple sites in spine with sciatica  M54.40 724.2     724.3   2. Chronic bilateral low back pain without sciatica  M54.50 724.2    G89.29 338.29   3. Impaired mobility  Z74.09 799.89   4. Decreased activities of daily living (ADL)  Z78.9 V49.89     Patient Active Problem List   Diagnosis   • Lumbosacral disc disease   • Chronic bilateral low back pain without sciatica   • Lumbago of multiple sites in spine with sciatica   • Type 2 diabetes mellitus with hyperglycemia, without long-term current use of insulin (HCC)   • Essential hypertension   • Drug-induced constipation     Past Medical History:   Diagnosis Date   • Allergy to alpha-gal    • Arthritis    • Chronic bilateral low back pain without sciatica 2022   • Diabetes mellitus (HCC)    • Hearing loss, left    • History of staph infection    • Hypertension    • Low back pain    • Lumbosacral disc disease 2022   • Psoriasis    • Sleep apnea    • Tinnitus    • Vision disturbance     using rx eyedrops     Past Surgical History:   Procedure Laterality Date   • BACK SURGERY      x 2   • CHOLECYSTECTOMY     • COLONOSCOPY     • GANGLION CYST EXCISION Left     wrist   • LIPOMA EXCISION      back   • LUMBAR FUSION Left 2022    Procedure: LEFT LATERAL LUMBAR INTERBODY FUSION WITH INSTRUMENTATION L4-5;  Surgeon: CHARITO Garcia MD;  Location: [facility];  Service: Orthopedic Spine;  Laterality: Left;     PT Assessment (last 12 hours)     PT Evaluation and Treatment     Row Name 22 0725          Physical Therapy Time and Intention    Subjective Information complains of;pain  -AB     Document Type therapy note (daily note)  -AB     Mode of Treatment physical therapy  -AB     Row Name 22  0725          General Information    Existing Precautions/Restrictions brace worn when out of bed;fall;LSO;spinal  -AB     Row Name 12/24/22 0725          Pain    Pretreatment Pain Rating 4/10  -AB     Posttreatment Pain Rating 4/10  -AB     Pain Location - Side/Orientation Left  -AB     Pain Location lower  -AB     Pain Location - extremity  -AB     Row Name 12/24/22 0725          Bed Mobility    Supine-Sit [place] (Bed Mobility) verbal cues;standby assist  -AB     Sit-Supine [place] (Bed Mobility) --  up in chair  -AB     Row Name 12/24/22 0725          Sit-Stand Transfer    Sit-Stand [place] (Transfers) contact guard  -AB     Assistive Device (Sit-Stand Transfers) walker, front-wheeled  -AB     Row Name 12/24/22 0725          Stand-Sit Transfer    Stand-Sit [place] (Transfers) verbal cues;contact guard  -AB     Assistive Device (Stand-Sit Transfers) walker, front-wheeled  -AB     Row Name 12/24/22 0725          Gait/Stairs (Locomotion)    [place] Level (Gait) contact guard  -AB     Assistive Device (Gait) walker, front-wheeled  -AB     Distance in Feet (Gait) 300ft  -AB     Bilateral Gait Deviations forward flexed posture  -AB     Row Name 12/24/22 0725          Aerobic Exercise    Comment, Aerobic Exercise (Therapeutic Exercise) sitting 20 reps  -AB     Row Name             Wound 12/19/22 1109 Left lower abdomen Incision    Wound - Properties Group Placement Date: 12/19/22  -DT Placement Time: 1109  -DT Present on Hospital Admission: N  -DT Side: Left  -DT Orientation: lower  -DT Location: abdomen  -DT Primary Wound Type: Incision  -DT    Retired Wound - Properties Group Placement Date: 12/19/22  -DT Placement Time: 1109  -DT Present on Hospital Admission: N  -DT Side: Left  -DT Orientation: lower  -DT Location: abdomen  -DT Primary Wound Type: Incision  -DT    Retired Wound - Properties Group Date first assessed: 12/19/22  -DT Time first assessed: 1109  -DT Present on Hospital  Admission: N  -DT Side: Left  -DT Location: abdomen  -DT Primary Wound Type: Incision  -DT    Row Name             Wound 12/21/22 0924 Left flank Incision    Wound - Properties Group Placement Date: 12/21/22  -DT Placement Time: 0924  -DT Present on Hospital Admission: N  -DT Side: Left  -DT Location: flank  -DT Primary Wound Type: Incision  -DT    Retired Wound - Properties Group Placement Date: 12/21/22  -DT Placement Time: 0924  -DT Present on Hospital Admission: N  -DT Side: Left  -DT Location: flank  -DT Primary Wound Type: Incision  -DT    Retired Wound - Properties Group Date first assessed: 12/21/22  -DT Time first assessed: 0924  -DT Present on Hospital Admission: N  -DT Side: Left  -DT Location: flank  -DT Primary Wound Type: Incision  -DT    Row Name             Wound 12/23/22 0805 lumbar spine Incision    Wound - Properties Group Placement Date: 12/23/22  -DT Placement Time: 0805  -DT Present on Hospital Admission: N  -DT Location: lumbar spine  -DT Primary Wound Type: Incision  -DT    Retired Wound - Properties Group Placement Date: 12/23/22  -DT Placement Time: 0805  -DT Present on Hospital Admission: N  -DT Location: lumbar spine  -DT Primary Wound Type: Incision  -DT    Retired Wound - Properties Group Date first assessed: 12/23/22  -DT Time first assessed: 0805  -DT Present on Hospital Admission: N  -DT Location: lumbar spine  -DT Primary Wound Type: Incision  -DT    Row Name 12/24/22 0725          Positioning and Restraints    Pre-Treatment Position in bed  -AB     Post Treatment Position chair  -AB     In Chair sitting;call light within reach  -AB           User Key  (r) = Recorded By, (t) = Taken By, (c) = Cosigned By    Initials Name Provider Type    AB Tracy Fuller PTA Physical Therapist Assistant    Master Coronado, RN Registered Nurse                Physical Therapy Education     Title: PT OT SLP Therapies (In Progress)     Topic: Physical Therapy (In Progress)     Point: Mobility  training (Done)     Learning Progress Summary           Patient Acceptance, E, VU,DU,NR by DOUG at 12/23/2022 1120    Comment: back prec, LSO use/radha/doff., progression of PT    Acceptance, E, VU by MS at 12/21/2022 1557    Comment: role of PT in his care, spinal restrictions    Acceptance, E, NR by DOUG at 12/19/2022 1449    Comment: Benefits of activity, progression of PT, back precautions, LSO use/radha/doff                   Point: Home exercise program (Not Started)     Learner Progress:  Not documented in this visit.          Point: Body mechanics (Not Started)     Learner Progress:  Not documented in this visit.          Point: Precautions (Done)     Learning Progress Summary           Patient Acceptance, E, VU,DU,NR by DOUG at 12/23/2022 1120    Comment: back prec, LSO use/radha/doff., progression of PT    Acceptance, E, VU by MS at 12/21/2022 1557    Comment: role of PT in his care, spinal restrictions    Acceptance, E, NR by DOUG at 12/19/2022 1449    Comment: Benefits of activity, progression of PT, back precautions, LSO use/radha/doff                               User Key     Initials Effective Dates Name Provider Type Discipline    DOGU 08/02/16 -  Rakesh Chenug, PT DPT Physical Therapist PT    MS 06/19/18 -  hCantelle Willson, PT, DPT, NCS Physical Therapist PT              PT Recommendation and Plan         Outcome Measures     Row Name 12/22/22 1000 12/22/22 0800          How much help from another person do you currently need...    Turning from your back to your side while in flat bed without using bedrails? 3  -KJ --     Moving from lying on back to sitting on the side of a flat bed without bedrails? 3  -KJ --     Moving to and from a bed to a chair (including a wheelchair)? 3  -KJ --     Standing up from a chair using your arms (e.g., wheelchair, bedside chair)? 3  -KJ --     Climbing 3-5 steps with a railing? 3  -KJ --     To walk in hospital room? 3  -KJ --     AM-PAC 6 Clicks Score (PT) 18  -KJ --         How much help from another is currently needed...    Putting on and taking off regular lower body clothing? -- 2  -AC     Bathing (including washing, rinsing, and drying) -- 3  -AC     Toileting (which includes using toilet bed pan or urinal) -- 3  -AC     Putting on and taking off regular upper body clothing -- 4  -AC     Taking care of personal grooming (such as brushing teeth) -- 4  -AC     Eating meals -- 4  -AC     AM-PAC 6 Clicks Score (OT) -- 20  -AC        Functional Assessment    Outcome Measure Options AM-PAC 6 Clicks Basic Mobility (PT)  -KJ AM-PAC 6 Clicks Daily Activity (OT)  -AC           User Key  (r) = Recorded By, (t) = Taken By, (c) = Cosigned By    Initials Name Provider Type    AC Abilio Castillo, OTR/L, CNT Occupational Therapist    KJ Wendy Valentin, PTA Physical Therapist Assistant                 Time Calculation:    PT Charges     Row Name 12/24/22 0725             Time Calculation    Start Time 0725  -AB      Stop Time 0748  -AB      Time Calculation (min) 23 min  -AB      PT Received On 12/24/22  -AB         Time Calculation- PT    Total Timed Code Minutes- PT 23 minute(s)  -AB            User Key  (r) = Recorded By, (t) = Taken By, (c) = Cosigned By    Initials Name Provider Type    AB Tracy Fuller PTA Physical Therapist Assistant              Therapy Charges for Today     Code Description Service Date Service Provider Modifiers Qty    82070640278 HC GAIT TRAINING EA 15 MIN 12/24/2022 Tracy Fuller, SHAD GP 1    60878168713 HC PT THER PROC EA 15 MIN 12/24/2022 Tracy Fuller PTA GP 1          PT G-Codes  Outcome Measure Options: AM-PAC 6 Clicks Daily Activity (OT)  AM-PAC 6 Clicks Score (PT): 18  AM-PAC 6 Clicks Score (OT): 20    Tracy Fuller PTA  12/24/2022

## 2022-12-24 NOTE — THERAPY TREATMENT NOTE
Acute Care - Occupational Therapy Treatment Note  [facility]     Patient Name: Manuel Moscoso  : 1960  MRN: [id number]  Today's Date: 2022  Onset of Illness/Injury or Date of Surgery: 22  Date of Referral to OT: 22  Referring Physician: Dr. Garcia    Admit Date: 2022       ICD-10-CM ICD-9-CM   1. Lumbago of multiple sites in spine with sciatica  M54.40 724.2     724.3   2. Chronic bilateral low back pain without sciatica  M54.50 724.2    G89.29 338.29   3. Impaired mobility  Z74.09 799.89   4. Decreased activities of daily living (ADL)  Z78.9 V49.89     Patient Active Problem List   Diagnosis   • Lumbosacral disc disease   • Chronic bilateral low back pain without sciatica   • Lumbago of multiple sites in spine with sciatica   • Type 2 diabetes mellitus with hyperglycemia, without long-term current use of insulin (HCC)   • Essential hypertension   • Drug-induced constipation     Past Medical History:   Diagnosis Date   • Allergy to alpha-gal    • Arthritis    • Chronic bilateral low back pain without sciatica 2022   • Diabetes mellitus (HCC)    • Hearing loss, left    • History of staph infection    • Hypertension    • Low back pain    • Lumbosacral disc disease 2022   • Psoriasis    • Sleep apnea    • Tinnitus    • Vision disturbance     using rx eyedrops     Past Surgical History:   Procedure Laterality Date   • BACK SURGERY      x 2   • CHOLECYSTECTOMY     • COLONOSCOPY     • GANGLION CYST EXCISION Left     wrist   • LIPOMA EXCISION      back   • LUMBAR FUSION Left 2022    Procedure: LEFT LATERAL LUMBAR INTERBODY FUSION WITH INSTRUMENTATION L4-5;  Surgeon: CHARITO Garcia MD;  Location: [facility];  Service: Orthopedic Spine;  Laterality: Left;         OT ASSESSMENT FLOWSHEET (last 12 hours)     OT Evaluation and Treatment     Row Name 22 0856                   OT Time and Intention    Subjective Information complains of;pain  -TS        Document  Type therapy note (daily note)  -TS        Mode of Treatment occupational therapy  -TS        Patient Effort good  -TS           General Information    Existing Precautions/Restrictions brace worn when out of bed;fall;LSO;spinal  -TS           Pain Assessment    Pretreatment Pain Rating 5/10  -TS        Posttreatment Pain Rating 6/10  -TS        Pain Location - Side/Orientation Left  -TS        Pain Location incisional  -TS        Pain Location - abdomen  -TS        Pain Intervention(s) Medication (See MAR);Repositioned;Shower  -TS           Cognition    Personal Safety Interventions fall prevention program maintained;gait belt;nonskid shoes/slippers when out of bed  -TS           Activities of Daily Living    BADL Assessment/Intervention bathing;upper body dressing;lower body dressing  -TS           Bathing Assessment/Intervention    [place] Level (Bathing) upper body;lower body;set up;supervision  -TS        Assistive Devices (Bathing) grab bar, tub/shower;hand-held shower spray hose;shower chair  -TS        Position (Bathing) unsupported sitting  -TS           Upper Body Dressing Assessment/Training    [place] Level (Upper Body Dressing) don;pull-over garment;set up  -TS        Position (Upper Body Dressing) unsupported sitting  -TS           Lower Body Dressing Assessment/Training    [place] Level (Lower Body Dressing) don;pants/bottoms;socks;set up;minimum assist (75% patient effort)  -TS        Position (Lower Body Dressing) unsupported sitting;supported standing  -TS           Bed Mobility    Sit-Supine [place] (Bed Mobility) modified independence  -TS        Assistive Device (Bed Mobility) bed rails  -TS           Functional Mobility    Functional Mobility- Ind. Level supervision required  -TS        Functional Mobility- Device walker, front-wheeled  -TS           Transfer Assessment/Treatment    Transfers sit-stand transfer;stand-sit transfer;shower transfer  -TS           Sit-Stand  Transfer    Sit-Stand [place] (Transfers) modified independence  -TS        Assistive Device (Sit-Stand Transfers) walker, front-wheeled  -TS           Stand-Sit Transfer    Stand-Sit [place] (Transfers) independent  -TS           Shower Transfer    Type (Shower Transfer) sit-stand;stand-sit  -TS        [place] Level (Shower Transfer) modified independence  -TS        Assistive Device (Shower Transfer) shower chair;grab bar, tub/shower  -TS           Orthotics & Prosthetics Management    Orthosis Location spinal orthosis  -TS        Additional Documentation Orthosis Location (Row)  -TS           Spinal Orthosis Management    Type (Spinal Orthosis) LSO (lumbar sacral orthosis)  -TS        Wearing Schedule (Spinal Orthosis) wear when out of bed only  -TS        Orthosis Training (Spinal Orthosis) patient;donning/doffing orthosis;wearing schedule  -TS           Wound 12/19/22 1109 Left lower abdomen Incision    Wound - Properties Group Placement Date: 12/19/22  -DT Placement Time: 1109  -DT Present on Hospital Admission: N  -DT Side: Left  -DT Orientation: lower  -DT Location: abdomen  -DT Primary Wound Type: Incision  -DT    Retired Wound - Properties Group Placement Date: 12/19/22  -DT Placement Time: 1109  -DT Present on Hospital Admission: N  -DT Side: Left  -DT Orientation: lower  -DT Location: abdomen  -DT Primary Wound Type: Incision  -DT    Retired Wound - Properties Group Date first assessed: 12/19/22  -DT Time first assessed: 1109  -DT Present on Hospital Admission: N  -DT Side: Left  -DT Location: abdomen  -DT Primary Wound Type: Incision  -DT       Wound 12/21/22 0924 Left flank Incision    Wound - Properties Group Placement Date: 12/21/22  -DT Placement Time: 0924  -DT Present on Hospital Admission: N  -DT Side: Left  -DT Location: flank  -DT Primary Wound Type: Incision  -DT    Retired Wound - Properties Group Placement Date: 12/21/22  -DT Placement Time: 0924  -DT Present on Hospital  Admission: N  -DT Side: Left  -DT Location: flank  -DT Primary Wound Type: Incision  -DT    Retired Wound - Properties Group Date first assessed: 12/21/22  -DT Time first assessed: 0924 -DT Present on Hospital Admission: N  -DT Side: Left  -DT Location: flank  -DT Primary Wound Type: Incision  -DT       Wound 12/23/22 0805 lumbar spine Incision    Wound - Properties Group Placement Date: 12/23/22  -DT Placement Time: 0805 -DT Present on Hospital Admission: N  -DT Location: lumbar spine  -DT Primary Wound Type: Incision  -DT    Retired Wound - Properties Group Placement Date: 12/23/22  -DT Placement Time: 0805 -DT Present on Hospital Admission: N  -DT Location: lumbar spine  -DT Primary Wound Type: Incision  -DT    Retired Wound - Properties Group Date first assessed: 12/23/22  -DT Time first assessed: 0805 -DT Present on Hospital Admission: N  -DT Location: lumbar spine  -DT Primary Wound Type: Incision  -DT       Positioning and Restraints    Pre-Treatment Position sitting in chair/recliner  -TS        Post Treatment Position bed  -TS        In Bed side lying right;call light within reach;encouraged to call for assist;side rails up x2  -TS           Transfer Goal 1 (OT)    Activity/Assistive Device (Transfer Goal 1, OT) bed-to-chair/chair-to-bed;toilet;shower chair;walker, rolling  -TS        [place] Level/Cues Needed (Transfer Goal 1, OT) modified independence  -TS        Time Frame (Transfer Goal 1, OT) long term goal (LTG);10 days  -TS        Progress/Outcome (Transfer Goal 1, OT) goal met  -TS           Bathing Goal 1 (OT)    Activity/Device (Bathing Goal 1, OT) lower body bathing  -TS        [place] Level/Cues Needed (Bathing Goal 1, OT) minimum assist (75% or more patient effort)  -TS        Time Frame (Bathing Goal 1, OT) long term goal (LTG);10 days  -TS        Progress/Outcomes (Bathing Goal 1, OT) goal met  -TS              User Key  (r) = Recorded By, (t) = Taken By, (c) = Cosigned By     Initials Name Effective Dates    TS Sancho, BarbaraLIANG Bynum 06/16/21 -     DT Master Sosa RN 02/17/22 -                  Occupational Therapy Education     Title: PT OT SLP Therapies (In Progress)     Topic: Occupational Therapy (In Progress)     Point: ADL training (Done)     Description:   Instruct learner(s) on proper safety adaptation and remediation techniques during self care or transfers.   Instruct in proper use of assistive devices.              Learning Progress Summary           Patient Acceptance, E, VU by  at 12/23/2022 1208    Acceptance, E,TB,D, VU,DU by  at 12/22/2022 0859    Acceptance, E, VU by  at 12/19/2022 1533                   Point: Home exercise program (Not Started)     Description:   Instruct learner(s) on appropriate technique for monitoring, assisting and/or progressing therapeutic exercises/activities.              Learner Progress:  Not documented in this visit.          Point: Precautions (Done)     Description:   Instruct learner(s) on prescribed precautions during self-care and functional transfers.              Learning Progress Summary           Patient Acceptance, E, VU by TRISHA at 12/23/2022 1208    Acceptance, E,TB,D, VU,DU by  at 12/22/2022 0859    Acceptance, E, VU by  at 12/19/2022 1533                   Point: Body mechanics (Done)     Description:   Instruct learner(s) on proper positioning and spine alignment during self-care, functional mobility activities and/or exercises.              Learning Progress Summary           Patient Acceptance, E,TB,D, VU,DU by  at 12/22/2022 0859                               User Key     Initials Effective Dates Name Provider Type Discipline     04/09/19 -  Abilio Castillo, OTR/L, CNT Occupational Therapist OT    J 11/10/21 -  Steffi Tenorio OTR/L, CSRS Occupational Therapist OT                  OT Recommendation and Plan           Outcome Measures     Row Name 12/22/22 1000 12/22/22 0800          How much help  from another person do you currently need...    Turning from your back to your side while in flat bed without using bedrails? 3  -KJ --     Moving from lying on back to sitting on the side of a flat bed without bedrails? 3  -KJ --     Moving to and from a bed to a chair (including a wheelchair)? 3  -KJ --     Standing up from a chair using your arms (e.g., wheelchair, bedside chair)? 3  -KJ --     Climbing 3-5 steps with a railing? 3  -KJ --     To walk in hospital room? 3  -KJ --     AM-PAC 6 Clicks Score (PT) 18  -KJ --        How much help from another is currently needed...    Putting on and taking off regular lower body clothing? -- 2  -AC     Bathing (including washing, rinsing, and drying) -- 3  -AC     Toileting (which includes using toilet bed pan or urinal) -- 3  -AC     Putting on and taking off regular upper body clothing -- 4  -AC     Taking care of personal grooming (such as brushing teeth) -- 4  -AC     Eating meals -- 4  -AC     AM-PAC 6 Clicks Score (OT) -- 20  -AC        Functional Assessment    Outcome Measure Options AM-PAC 6 Clicks Basic Mobility (PT)  -KJ AM-PAC 6 Clicks Daily Activity (OT)  -AC           User Key  (r) = Recorded By, (t) = Taken By, (c) = Cosigned By    Initials Name Provider Type    AC Abilio Castillo, OTR/L, CNT Occupational Therapist    Wendy Dawson, PTA Physical Therapist Assistant                Time Calculation:    Time Calculation- OT     Row Name 12/24/22 1008             Time Calculation- OT    OT Start Time 0856  -TS      OT Stop Time 1007  -TS      OT Time Calculation (min) 71 min  -TS      Total Timed Code Minutes- OT 71 minute(s)  -TS      OT Received On 12/24/22  -TS         Timed Charges    73026 - OT Self Care/Mgmt Minutes 71  -TS         Total Minutes    Timed Charges Total Minutes 71  -TS       Total Minutes 71  -TS            User Key  (r) = Recorded By, (t) = Taken By, (c) = Cosigned By    Initials Name Provider Type    TS Barbara Kingsley COTA  Occupational Therapist Assistant              Therapy Charges for Today     Code Description Service Date Service Provider Modifiers Qty    [id number] HC OT SELF CARE/MGMT/TRAIN EA 15 MIN 12/24/2022 Barbara Kingsley, LIANG GO 5               LIANG Calix  12/24/2022

## 2022-12-24 NOTE — PLAN OF CARE
Goal Outcome Evaluation:  Plan of Care Reviewed With: patient        Progress: improving  Outcome Evaluation: Patient is A+Ox4, NATH with noticable LLE weakness- LSO brace in room and patient is wearing appropriately- IVF going- tolerating PO pain medication. Walker delivered to room in anticipation of DC soon and holiday soon. Tolerating food and drink- RA-VSS-Safety maintained   Partial refill until PCP returns.

## 2022-12-24 NOTE — PROGRESS NOTES
Manuel Moscoso is a 62 y.o. male patient.      Pain better controlled  Still with left leg weakness but improving per physical therapy notes.  Still no bowel movement but wants to try at home.      Current Facility-Administered Medications   Medication Dose Route Frequency Provider Last Rate Last Admin   • acetaminophen (TYLENOL) tablet 650 mg  650 mg Oral Q4H PRN CHARITO Garcia MD   650 mg at 12/22/22 1455   • atorvastatin (LIPITOR) tablet 20 mg  20 mg Oral Daily CHARITO Garcia MD   20 mg at 12/23/22 1050   • atropine sulfate injection 0.5 mg  0.5 mg Intravenous Once PRN CHARITO Garcia MD       • bisacodyl (DULCOLAX) suppository 10 mg  10 mg Rectal Daily PRN CHARITO Garcia MD       • cloNIDine (CATAPRES) tablet 0.1 mg  0.1 mg Oral TID CHARITO Garcia MD   0.1 mg at 12/23/22 2030   • cyclobenzaprine (FLEXERIL) tablet 10 mg  10 mg Oral TID PRN CHARITO Garcia MD   10 mg at 12/22/22 2011   • dextrose (D50W) (25 g/50 mL) IV injection 25 g  25 g Intravenous Q15 Min PRN CHARITO Garcia MD       • dextrose (GLUTOSE) oral gel 15 g  15 g Oral Q15 Min PRN CHARITO Garcia MD       • fentaNYL citrate (PF) (SUBLIMAZE) injection 25 mcg  25 mcg Intravenous Q5 Min PRN CHARITO Garcia MD       • flumazenil (ROMAZICON) injection 0.2 mg  0.2 mg Intravenous PRN CHARITO Garcia MD       • gabapentin (NEURONTIN) capsule 300 mg  300 mg Oral TID CHARITO Garcia MD   300 mg at 12/23/22 2030   • glucagon (human recombinant) (GLUCAGEN DIAGNOSTIC) injection 1 mg  1 mg Intramuscular Q15 Min PRN CHARITO Garcia MD       • hydroCHLOROthiazide (HYDRODIURIL) tablet 12.5 mg  12.5 mg Oral Daily CHARITO Garcia MD   12.5 mg at 12/23/22 1049   • HYDROmorphone (DILAUDID) injection 0.5 mg  0.5 mg Intravenous Q10 Min PRN CHARITO Garcia MD   0.5 mg at 12/21/22 1202   • HYDROmorphone (DILAUDID) injection 1 mg  1 mg Intravenous Q3H PRN CHARITO Garcia MD   1 mg at 12/24/22 0526    And   •  naloxone (NARCAN) injection 0.4 mg  0.4 mg Intravenous Q5 Min PRN CHARITO Garcia MD       • Insulin Lispro (humaLOG) injection 2-7 Units  2-7 Units Subcutaneous TID AC CHARITO Garcia MD   2 Units at 12/22/22 1217   • labetalol (NORMODYNE,TRANDATE) injection 5 mg  5 mg Intravenous Q5 Min PRN CHARITO Garcia MD       • lactated ringers infusion  100 mL/hr Intravenous Continuous PRN CHARITO Garcia  mL/hr at 12/23/22 0729 Restarted at 12/23/22 0835   • lactated ringers infusion  100 mL/hr Intravenous Continuous CHARITO Garcia  mL/hr at 12/24/22 0528 100 mL/hr at 12/24/22 0528   • losartan (COZAAR) tablet 25 mg  25 mg Oral Daily CHARITO Garcia MD   25 mg at 12/23/22 1049   • magnesium hydroxide (MILK OF MAGNESIA) suspension 10 mL  10 mL Oral Daily PRN HCARITO Garcia MD   10 mL at 12/23/22 1049   • metFORMIN (GLUCOPHAGE) tablet 1,000 mg  1,000 mg Oral BID With Meals CHARITO Garcia MD   1,000 mg at 12/23/22 1840   • naloxone (NARCAN) injection 0.04 mg  0.04 mg Intravenous PRN CHARITO Garcia MD       • ondansetron (ZOFRAN) tablet 4 mg  4 mg Oral Q6H PRN CHARITO Garcia MD        Or   • ondansetron (ZOFRAN) injection 4 mg  4 mg Intravenous Q6H PRN CHARITO Garcia MD   4 mg at 12/22/22 0853   • ondansetron (ZOFRAN) injection 4 mg  4 mg Intravenous Q15 Min PRN CHARITO Garcia MD       • oxyCODONE-acetaminophen (PERCOCET)  MG per tablet 1 tablet  1 tablet Oral Once PRN CHARITO Garcia MD       • oxyCODONE-acetaminophen (PERCOCET) 7.5-325 MG per tablet 1 tablet  1 tablet Oral Q4H PRN CHARITO Garcia MD   1 tablet at 12/24/22 0246   • oxyCODONE-acetaminophen (PERCOCET) 7.5-325 MG per tablet 2 tablet  2 tablet Oral Q4H PRN CHARITO Garcia MD   2 tablet at 12/23/22 1839   • polyethylene glycol (MIRALAX) packet 17 g  17 g Oral BID CHARITO Garcia MD   17 g at 12/23/22 2030   • sennosides-docusate (PERICOLACE) 8.6-50 MG per tablet 1 tablet  1  tablet Oral BID CHARITO Garcia MD   1 tablet at 12/23/22 2030   • sodium chloride 0.9 % flush 10 mL  10 mL Intravenous PRN CHARITO Garcia MD   10 mL at 12/19/22 2013   • sodium chloride 0.9 % flush 3 mL  3 mL Intravenous Q12H CHARITO Garcia MD   3 mL at 12/22/22 2008   • sodium chloride 0.9 % infusion 40 mL  40 mL Intravenous PRN CHARITO Garcia MD         ALLERGIES:    Allergies   Allergen Reactions   • Alpha-Gal Nausea And Vomiting and GI Intolerance     Red meat allergy   • Penicillins Other (See Comments)     STATES HE \"HAS NO IDEA\" AND THAT IT WAS A CHILDHOOD ALLERGY       Lumbago of multiple sites in spine with sciatica    Lumbosacral disc disease    Chronic bilateral low back pain without sciatica    Type 2 diabetes mellitus with hyperglycemia, without long-term current use of insulin (HCC)    Essential hypertension    Drug-induced constipation    Blood pressure 123/56, pulse 75, temperature 98.4 °F (36.9 °C), temperature source Oral, resp. rate 16, height 175.3 cm (69\"), weight 101 kg (223 lb 9.6 oz), SpO2 97 %.      Subjective:  Symptoms:  Stable.    Diet:  Poor intake.    Activity level: Impaired due to pain.    Pain:  He complains of pain that is severe.  He reports pain is unchanged.  Pain is partially controlled.      Review of Systems  Objective:  General Appearance:  Comfortable, well-appearing and in distress (From pain).    Vital signs: (most recent): Blood pressure 123/56, pulse 75, temperature 98.4 °F (36.9 °C), temperature source Oral, resp. rate 16, height 175.3 cm (69\"), weight 101 kg (223 lb 9.6 oz), SpO2 97 %.  Vital signs are normal.    Output: Producing urine and minimal stool output.    HEENT: Normal HEENT exam.    Lungs:  Normal effort and normal respiratory rate.    Heart: Normal rate.  Regular rhythm.    Abdomen: Abdomen is soft.  Hypoactive bowel sounds.   There is generalized tenderness.     Extremities: Decreased range of motion.    Skin:  Warm and dry.               Labs:  Lab Results (last 72 hours)     Procedure Component Value Units Date/Time    POC Glucose Once [[id number]]  (Abnormal) Collected: 12/22/22 0719    Specimen: Blood Updated: 12/22/22 0812     Glucose 157 mg/dL      Comment: : [id number] Dakota PeraltaaMeter ID: [id number]       POC Glucose Once [[id number]]  (Abnormal) Collected: 12/21/22 1650    Specimen: Blood Updated: 12/21/22 1701     Glucose 153 mg/dL      Comment: : [id number] Brenden MorganMeter ID: [id number]       POC Glucose Once [[id number]]  (Abnormal) Collected: 12/21/22 1102    Specimen: Blood Updated: 12/21/22 1113     Glucose 136 mg/dL      Comment: : [id number] Idania (Bone) ShannonMeter ID: [id number]       POC Glucose Once [[id number]]  (Normal) Collected: 12/21/22 0753    Specimen: Blood Updated: 12/21/22 0816     Glucose 122 mg/dL      Comment: : [id number] Brenden KashifMeter ID: [id number]       POC Glucose Once [[id number]]  (Abnormal) Collected: 12/20/22 2005    Specimen: Blood Updated: 12/20/22 2016     Glucose 133 mg/dL      Comment: : [id number] Renetta LaresMeter ID: [id number]       POC Glucose Once [[id number]]  (Abnormal) Collected: 12/20/22 1701    Specimen: Blood Updated: 12/20/22 1713     Glucose 169 mg/dL      Comment: : [id number] Miranda Gross ID: [id number]       POC Glucose Once [[id number]]  (Abnormal) Collected: 12/20/22 1208    Specimen: Blood Updated: 12/20/22 1219     Glucose 330 mg/dL      Comment: : [id number] Miranda Gross ID: [id number]       Basic Metabolic Panel [[id number]]  (Abnormal) Collected: 12/20/22 0719    Specimen: Blood Updated: 12/20/22 0757     Glucose 146 mg/dL      BUN 17 mg/dL      Creatinine 0.96 mg/dL      Sodium 141 mmol/L      Potassium 4.1 mmol/L      Chloride 102 mmol/L      CO2 28.0 mmol/L      Calcium 9.2 mg/dL      BUN/Creatinine Ratio 17.7     Anion Gap 11.0 mmol/L      eGFR 89.9 mL/min/1.73      Comment: National Kidney Foundation and American Society of  Nephrology (ASN) Task Force recommended calculation based on the Chronic Kidney Disease Epidemiology Collaboration (CKD-EPI) equation refit without adjustment for race.       Narrative:      GFR Normal >60  Chronic Kidney Disease <60  Kidney Failure <15      CBC & Differential [[id number]]  (Abnormal) Collected: 12/20/22 0719    Specimen: Blood Updated: 12/20/22 0734    Narrative:      The following orders were created for panel order CBC & Differential.  Procedure                               Abnormality         Status                     ---------                               -----------         ------                     CBC Auto Differential[[id number]]        Abnormal            Final result                 Please view results for these tests on the individual orders.    CBC Auto Differential [[id number]]  (Abnormal) Collected: 12/20/22 0719    Specimen: Blood Updated: 12/20/22 0734     WBC 11.12 10*3/mm3      RBC 4.43 10*6/mm3      Hemoglobin 13.9 g/dL      Hematocrit 41.1 %      MCV 92.8 fL      MCH 31.4 pg      MCHC 33.8 g/dL      RDW 11.9 %      RDW-SD 40.8 fl      MPV 11.0 fL      Platelets 222 10*3/mm3      Neutrophil % 76.6 %      Lymphocyte % 13.6 %      Monocyte % 7.8 %      Eosinophil % 1.3 %      Basophil % 0.3 %      Immature Grans % 0.4 %      Neutrophils, Absolute 8.53 10*3/mm3      Lymphocytes, Absolute 1.51 10*3/mm3      Monocytes, Absolute 0.87 10*3/mm3      Eosinophils, Absolute 0.14 10*3/mm3      Basophils, Absolute 0.03 10*3/mm3      Immature Grans, Absolute 0.04 10*3/mm3      nRBC 0.0 /100 WBC     POC Glucose Once [[id number]]  (Abnormal) Collected: 12/19/22 1304    Specimen: Blood Updated: 12/19/22 1315     Glucose 139 mg/dL      Comment: : [id number] Prema Esteban ID: [id number]       POC Glucose Once [[id number]]  (Normal) Collected: 12/19/22 0851    Specimen: Blood Updated: 12/19/22 0902     Glucose 106 mg/dL      Comment: : [id number] Florence Cross ID: [id number]              Imaging Results (Last 72 Hours)     Procedure Component Value Units Date/Time    XR Spine Lumbar AP & Lateral [[id number]] Collected: 12/23/22 1309     Updated: 12/23/22 1314    Narrative:      EXAMINATION: XR SPINE LUMBAR AP AND LATERAL-  12/23/2022 1:10 PM CST      INDICATION: Intraoperative fluoroscopic guidance. fusion; M54.50-Low  back pain, unspecified; G89.29-Other chronic pain; Z74.09-Other reduced  mobility; Z78.9-Other specified health status     TECHNIQUE: 2 fluoroscopic images from the operating room were submitted  for evaluation. Please note, no radiologist was in attendance for  acquisition of these images. These images are available for future  reference to the attending surgeon. Total fluoroscopic time was 50.8  seconds. Provided radiation dose: 57.4  mGy.     FINDINGS: Frontal and lateral fluoroscopic images of the lower lumbar  spine were provided. Interval placement of lateral fusion construct at  L4-L5 on the RIGHT and posterior fusion hardware bilaterally at L4-S1.  Please see the operative report.        Impression:      1. Intraoperative fluoroscopic guidance as described.   2. Please refer to real-time fluoroscopy and operative report for full  details.   This report was finalized on 12/23/2022 13:11 by Dr. Enrrique Singh MD.    FL C Arm During Surgery [[id number]] Resulted: 12/23/22 1307     Updated: 12/23/22 1307    Narrative:      This procedure was auto-finalized with no dictation required.    XR Spine Lumbar AP & Lateral [[id number]] Collected: 12/21/22 1053     Updated: 12/21/22 1057    Narrative:      XR SPINE LUMBAR AP AND LATERAL- 12/21/2022 9:14 AM CST     HISTORY: fusion; M54.50-Low back pain, unspecified; G89.29-Other chronic  pain; Z74.09-Other reduced mobility     COMPARISON: None     FLUOROSCOPY TIME: 59 seconds     FLUOROSCOPY DOSE: 51.297 mGy     NUMBER OF IMAGES: 4       Impression:         Intraoperative fluoroscopic images during lateral fusion at the L4/L5  level.      Please refer to the operative note for more details.  This report was finalized on 12/21/2022 10:54 by Dr. Steffi Fraser MD.    FL C Arm During Surgery [[id number]] Resulted: 12/21/22 1045     Updated: 12/21/22 1045    Narrative:      This procedure was auto-finalized with no dictation required.                Assessment:    Condition: In stable condition.  Improving.       Plan:   Discharge home.  (    Obstipation/constipation-stimulate with Dulcolax suppository and/or milk of mag this morning.  Limited oral intake.  Continue with MiraLAX.  Active bowel sounds, still no bowel movement but wants to try at home.    For pain control- will need pain medications prior to getting up with physical therapy today.  Left lower extremity weakness is improving.    Type 2 diabetes-blood sugar stable.    If he does well with therapy and left lower extremity continues to progress along can be discharged home if cleared by spinal surgery.    Patient is stable for discharge home.    Follow-up with his primary care doctor 1-2 weeks for blood pressure.  Suspect his systolic blood pressure is up today secondary to poor pain control.    Explained to patient and staff that we were consulted for medical management during their acute care hospitalization. The Medical Consultation was requested by the Attending Physician. The patient has been recommended to f/u with their regular primary care provider concerning any further treatment and review of abnormalities found during their hospitalization at [facility].They agree with the treatment plan as well as understand our role in their hospitalization. All questions were encouraged and answered to best of my ability.).     Problem List:     Lumbago of multiple sites in spine with sciatica    Lumbosacral disc disease    Chronic bilateral low back pain without sciatica    Type 2 diabetes mellitus with hyperglycemia, without long-term current use of insulin (HCC)    Essential  hypertension    Drug-induced constipation    Adolph Chao MD  12/24/2022

## 2022-12-24 NOTE — DISCHARGE SUMMARY
[facility]  SPINE SURGERY  [facility] [place]  DISCHARGE SUMMARY  Patient ID:  Manuel Moscoso  [id number]  62 y.o.  1960    Admit date: 12/19/2022    Discharge date and time: 12/24/2022    Admitting Physician: CHARITO Garcia MD     Indication for Admission: Planned surgical admission     Admission Diagnoses: Lumbago of multiple sites in spine with sciatica [M54.40]    Discharge Diagnoses: Lumbago of multiple sites in spine with sciatica [M54.40]    Procedures: 1. ALIF L5/S1. 2. LLIF L4-5. 3. PSF L4-S1.    Problem List:   Lumbago of multiple sites in spine with sciatica    Lumbosacral disc disease    Chronic bilateral low back pain without sciatica    Type 2 diabetes mellitus with hyperglycemia, without long-term current use of insulin (HCC)    Essential hypertension    Drug-induced constipation      Consults: Family Medicine    Admission Condition: stable    Discharged Condition: stable    Hospital Course: No immediate post operative complication     Disposition: home    Patient Instructions:      Discharge Medications      ASK your doctor about these medications      Instructions Start Date   aspirin 81 MG EC tablet   81 mg, Oral, Daily      atorvastatin 20 MG tablet  Commonly known as: LIPITOR   20 mg, Oral, Daily      cloNIDine 0.1 MG tablet  Commonly known as: CATAPRES   0.1 mg, Oral, 2 Times Daily PRN      etanercept 50 MG/ML solution prefilled syringe injection  Commonly known as: ENBREL   50 mg, Subcutaneous, Weekly      gabapentin 300 MG capsule  Commonly known as: NEURONTIN   300 mg, Oral, 3 Times Daily      glipizide 5 MG tablet  Commonly known as: GLUCOTROL   5 mg, Oral, Daily      hydroCHLOROthiazide 12.5 MG tablet  Commonly known as: HYDRODIURIL   12.5 mg, Oral, Daily      losartan 25 MG tablet  Commonly known as: COZAAR   25 mg, Oral, Daily      metFORMIN 1000 MG tablet  Commonly known as: GLUCOPHAGE   2,000 mg, Oral, Daily, Patient takes 1 tablet  twice daily           Activity: Avoid bending lifting or twisting, no driving while taking narcotic pain medication, walk 15 minutes 4 times daily  Diet: regular diet  Wound Care: keep wound clean and dry  Other: Contact [facility] [place] office with questions or concerns    Follow-up with Dr Garcia or PA's in 2 weeks.    Electronically signed by TING Whelan 12:37 CST 12/24/2022

## 2022-12-25 NOTE — PLAN OF CARE
Goal Outcome Evaluation:  Plan of Care Reviewed With: patient        Progress: improving  Outcome Evaluation: Patient is A+Ox4, NATH with noticable LLE weakness- LSO brace in room and patient is wearing appropriately- IVF going- tolerating PO pain medication. Walker delivered to room in anticipation of DC soon and holiday soon. Tolerating food and drink- RA-VSS-Safety maintained    Patient A+Ox4, Tegaderm dressing place to posterior lumbar dressing. Tolerating up to chair and shower today. Tolerating PO pain medication and muscle relaxer. Tolerating food and drink. Incisions x3 are WNL- VSS- Safety maintained. Walker delivered to room. DC instructions completed with sister in room. Home today

## 2022-12-25 NOTE — OUTREACH NOTE
Prep Survey    Flowsheet Row Responses   [facility] patient discharged from? [facility]   Is LACE score < 7 ? No   Eligibility Readm Mgmt   Discharge diagnosis 1. ALIF L5/S1. 2. LLIF L4-5. 3. PSF L4-S1.   Does the patient have one of the following disease processes/diagnoses(primary or secondary)? General Surgery   Does the patient have Home health ordered? No   Is there a DME ordered? No   Prep survey completed? Yes          EVI BO - Registered Nurse

## 2022-12-26 NOTE — PAYOR COMM NOTE
REF:  [id number]    [facility]  ARUN,   [phone]  OR  FAX    [phone]  OR   FAX  [phone]      Maegan Foster (62 y.o. Male)     Date of Birth   1960    Social Security Number       Address   [address] [place] [address]    Home Phone   [phone]    MRN   [id number]       Yazidi   Other    Marital Status                               Admission Date   12/19/22    Admission Type   Elective    Admitting Provider   CHARITO Garcia MD    Attending Provider       Department, Room/Bed   [facility] 3A, 328/1       Discharge Date   12/24/2022    Discharge Disposition   Home or Self Care    Discharge Destination                               Attending Provider: (none)   Allergies: Alpha-gal, Penicillins    Isolation: None   Infection: None   Code Status: Prior    Ht: 175.3 cm (69\")   Wt: 101 kg (223 lb 9.6 oz)    Admission Cmt: None   Principal Problem: Lumbago of multiple sites in spine with sciatica [M54.40]                 Active Insurance as of 12/19/2022     Primary Coverage     Payor Plan Insurance Group Employer/Plan Group    [employer] BLUE [employer]OSS [employer] BLUE CROSS BLUE [employer]IELD PPO [id number]     Payor Plan Address Payor Plan Phone Number Payor Plan Fax Number Effective Dates    [address] [phone]  3/1/2021 - None Entered    [place] [address]       Subscriber Name Subscriber Birth Date Member ID       MAEGAN FOSTER 1960 [id number]                 Emergency Contacts      (Rel.) Home Phone Work Phone Mobile Phone    CRISTIANHECTOR (Friend) -- -- [phone]               Discharge Summary      Michael Wallace PA at 12/24/22 1237     Attestation signed by CHARITO Garcia MD at 12/24/22 1242    I have reviewed this documentation and agree.                  [facility]  SPINE SURGERY  [facility] [place]  DISCHARGE SUMMARY  Patient ID:  Maegan Marinelli  Blanka  [id number]  62 y.o.  1960    Admit date: 12/19/2022    Discharge date and time: 12/24/2022    Admitting Physician: CHARITO Garcia MD     Indication for Admission: Planned surgical admission     Admission Diagnoses: Lumbago of multiple sites in spine with sciatica [M54.40]    Discharge Diagnoses: Lumbago of multiple sites in spine with sciatica [M54.40]    Procedures: 1. ALIF L5/S1. 2. LLIF L4-5. 3. PSF L4-S1.    Problem List:   Lumbago of multiple sites in spine with sciatica    Lumbosacral disc disease    Chronic bilateral low back pain without sciatica    Type 2 diabetes mellitus with hyperglycemia, without long-term current use of insulin (HCC)    Essential hypertension    Drug-induced constipation      Consults: Family Medicine    Admission Condition: stable    Discharged Condition: stable    Hospital Course: No immediate post operative complication     Disposition: home    Patient Instructions:      Discharge Medications      ASK your doctor about these medications      Instructions Start Date   aspirin 81 MG EC tablet   81 mg, Oral, Daily      atorvastatin 20 MG tablet  Commonly known as: LIPITOR   20 mg, Oral, Daily      cloNIDine 0.1 MG tablet  Commonly known as: CATAPRES   0.1 mg, Oral, 2 Times Daily PRN      etanercept 50 MG/ML solution prefilled syringe injection  Commonly known as: ENBREL   50 mg, Subcutaneous, Weekly      gabapentin 300 MG capsule  Commonly known as: NEURONTIN   300 mg, Oral, 3 Times Daily      glipizide 5 MG tablet  Commonly known as: GLUCOTROL   5 mg, Oral, Daily      hydroCHLOROthiazide 12.5 MG tablet  Commonly known as: HYDRODIURIL   12.5 mg, Oral, Daily      losartan 25 MG tablet  Commonly known as: COZAAR   25 mg, Oral, Daily      metFORMIN 1000 MG tablet  Commonly known as: GLUCOPHAGE   2,000 mg, Oral, Daily, Patient takes 1 tablet twice daily           Activity: Avoid bending lifting or twisting, no driving while taking narcotic pain medication, walk 15  minutes 4 times daily  Diet: regular diet  Wound Care: keep wound clean and dry  Other: Contact [facility] [place] office with questions or concerns    Follow-up with Dr Garcia or PA's in 2 weeks.    Electronically signed by TING Whelan 12:37 CST 12/24/2022      Electronically signed by CHARITO Garcia MD at 12/24/22 1242       Discharge Order (From admission, onward)     Start     Ordered    12/24/22 1244  Discharge patient  Once        Expected Discharge Date: 12/24/22    Discharge Disposition: Home or Self Care    Physician of Record for Attribution - Please select from Treatment Team: CHARITO GARCIA [[id number]]    Review needed by CMO to determine Physician of Record: No       Question Answer Comment   Physician of Record for Attribution - Please select from Treatment Team CHARITO GARCIA    Review needed by CMO to determine Physician of Record No        12/24/22 1244

## 2022-12-27 ENCOUNTER — READMISSION MANAGEMENT (OUTPATIENT)
Dept: CALL CENTER | Facility: HOSPITAL | Age: 62
End: 2022-12-27

## 2022-12-27 NOTE — OUTREACH NOTE
"General Surgery Week 1 Survey    Flowsheet Row Responses   [facility] patient discharged from? [place]   Does the patient have one of the following disease processes/diagnoses(primary or secondary)? General Surgery   Week 1 attempt successful? Yes   Call start time 0935   Call end time 0939   Discharge diagnosis 1. ALIF L5/S1. 2. LLIF L4-5. 3. PSF L4-S1.   Meds reviewed with patient/caregiver? Yes   Is the patient having any side effects they believe may be caused by any medication additions or changes? No   Does the patient have all medications related to this admission filled (includes all antibiotics, pain medications, etc.) Yes   Is the patient taking all medications as directed (includes completed medication regime)? Yes   Does the patient have a follow up appointment scheduled with their surgeon? No   What is preventing the patient from scheduling follow up appointments? Haven't had time   Nursing Interventions Educated patient on importance of making appointment   Has the patient kept scheduled appointments due by today? N/A   Has home health visited the patient within 72 hours of discharge? N/A   What DME was ordered? Walker    Has all DME been delivered? Yes   Psychosocial issues? No   Did the patient receive a copy of their discharge instructions? Yes   Nursing interventions Reviewed instructions with patient   What is the patient's perception of their health status since discharge? Improving  [\"Pain this morning\"-pt plans on taking his pain medcation ]   Nursing interventions Nurse provided patient education   Is the patient /caregiver able to teach back basic post-op care? Drive as instructed by MD in discharge instructions, Lifting as instructed by MD in discharge instructions, Keep incision areas clean,dry and protected, Take showers only when approved by MD-sponge bathe until then   Is the patient/caregiver able to teach back signs and symptoms of incisional infection? Increased redness, " swelling or pain at the incisonal site, Increased drainage or bleeding, Incisional warmth, Pus or odor from incision, Fever   Is the patient/caregiver able to teach back steps to recovery at home? Set small, achievable goals for return to baseline health, Rest and rebuild strength, gradually increase activity, Make a list of questions for surgeon's appointment   If the patient is a current smoker, are they able to teach back resources for cessation? Not a smoker   Is the patient/caregiver able to teach back the hierarchy of who to call/visit for symptoms/problems? PCP, Specialist, Home health nurse, Urgent Care, ED, 911 Yes   Week 1 call completed? Yes   Wrap up additional comments Pt had questions re: bandage surrounding incision.  Instructed pt to call surgeon's office.          TISHA PERDUE - Registered Nurse

## 2023-01-04 ENCOUNTER — READMISSION MANAGEMENT (OUTPATIENT)
Dept: CALL CENTER | Facility: HOSPITAL | Age: 63
End: 2023-01-04
Payer: COMMERCIAL

## 2023-01-04 NOTE — OUTREACH NOTE
General Surgery Week 2 Survey    Flowsheet Row Responses   [facility] facility patient discharged from? [place]   Does the patient have one of the following disease processes/diagnoses(primary or secondary)? General Surgery   Week 2 attempt successful? No   Unsuccessful attempts Attempt 1          ELLA Berrios Licensed Nurse

## 2023-01-10 ENCOUNTER — READMISSION MANAGEMENT (OUTPATIENT)
Dept: CALL CENTER | Facility: HOSPITAL | Age: 63
End: 2023-01-10
Payer: COMMERCIAL

## 2023-01-12 ENCOUNTER — READMISSION MANAGEMENT (OUTPATIENT)
Dept: CALL CENTER | Facility: HOSPITAL | Age: 63
End: 2023-01-12
Payer: COMMERCIAL

## 2023-01-12 NOTE — OUTREACH NOTE
General Surgery Week 3 Survey    Flowsheet Row Responses   [facility] patient discharged from? [place]   Does the patient have one of the following disease processes/diagnoses(primary or secondary)? General Surgery   Week 3 attempt successful? Yes   Call start time 0944   Call end time 0949   List who call center can speak with pt   Does the patient have a follow up appointment scheduled with their surgeon? Yes   Has the patient kept scheduled appointments due by today? Yes   What DME was ordered? Pt is no longer using a walker.   What is the patient's perception of their health status since discharge? Improving   Week 3 call completed? Yes   Wrap up additional comments Pt reports visiting surgeon with bandage removal. Incision intact no s/s of infection. Pt is walking daily and feeling well. Pt is pleased with recovery.          ADELINE CONROY - Registered Nurse

## 2023-01-20 ENCOUNTER — READMISSION MANAGEMENT (OUTPATIENT)
Dept: CALL CENTER | Facility: HOSPITAL | Age: 63
End: 2023-01-20
Payer: COMMERCIAL

## 2023-01-20 NOTE — OUTREACH NOTE
General Surgery Week 4 Survey    Flowsheet Row Responses   [facility] patient discharged from? [place]   Does the patient have one of the following disease processes/diagnoses(primary or secondary)? General Surgery   Week 4 attempt successful? Yes   Call start time 1339   Call end time 1341   Discharge diagnosis 1. ALIF L5/S1. 2. LLIF L4-5. 3. PSF L4-S1.   Person spoke with today (if not patient) and relationship pt   Is the patient taking all medications as directed (includes completed medication regime)? Yes   Has the patient kept scheduled appointments due by today? Yes   Is the patient still receiving Home Health Services? N/A   Psychosocial issues? No   What is the patient's perception of their health status since discharge? Improving   Nursing interventions Nurse provided patient education   Is the patient/caregiver able to teach back steps to recovery at home? Rest and rebuild strength, gradually increase activity, Eat a well-balance diet   If the patient is a current smoker, are they able to teach back resources for cessation? Not a smoker   Is the patient/caregiver able to teach back the hierarchy of who to call/visit for symptoms/problems? PCP, Specialist, Home health nurse, Urgent Care, ED, 911 Yes   Week 4 call completed? Yes   Would the patient like one additional call? No   Graduated Yes   Is the patient interested in additional calls from an ambulatory ?  NOTE:  applies to high risk patients requiring additional follow-up. No   Did the patient feel the follow up calls were helpful during their recovery period? Yes   Was the number of calls appropriate? Yes   Does the patient have an Advance Directive or Living Will? No   Is the patient/caregiver familiar with Advance Care Planning? No   Would the patient like more information on Advance Care Planning? No   Wrap up additional comments Pt states he is doing better, still feeling sore in surgical area. Pt shares he is walking good. Pt  has 2nd post-op appt on 1/27/23.          GREYSON GAMEZ - Registered

## 2023-12-20 ENCOUNTER — TRANSCRIBE ORDERS (OUTPATIENT)
Dept: ADMINISTRATIVE | Facility: HOSPITAL | Age: 63
End: 2023-12-20
Payer: COMMERCIAL

## 2023-12-20 DIAGNOSIS — M54.50 LOW BACK PAIN, UNSPECIFIED BACK PAIN LATERALITY, UNSPECIFIED CHRONICITY, UNSPECIFIED WHETHER SCIATICA PRESENT: Primary | ICD-10-CM

## 2024-02-01 ENCOUNTER — PRE-ADMISSION TESTING (OUTPATIENT)
Dept: PREADMISSION TESTING | Facility: HOSPITAL | Age: 64
End: 2024-02-01
Payer: MEDICAID

## 2024-02-01 ENCOUNTER — HOSPITAL ENCOUNTER (OUTPATIENT)
Dept: GENERAL RADIOLOGY | Facility: HOSPITAL | Age: 64
Discharge: HOME | End: 2024-02-01
Payer: MEDICAID

## 2024-02-01 VITALS
BODY MASS INDEX: 33.7 KG/M2 | SYSTOLIC BLOOD PRESSURE: 128 MMHG | HEART RATE: 82 BPM | HEIGHT: 69 IN | DIASTOLIC BLOOD PRESSURE: 83 MMHG | WEIGHT: 227.51 LBS | RESPIRATION RATE: 20 BRPM | OXYGEN SATURATION: 98 %

## 2024-02-01 LAB
ALBUMIN SERPL-MCNC: 4.4 G/DL (ref 3.5–5.2)
ALBUMIN/GLOB SERPL: 1.3 G/DL
ALP SERPL-CCNC: 27 U/L (ref 39–117)
ALT SERPL W P-5'-P-CCNC: 24 U/L (ref 1–41)
ANION GAP SERPL CALCULATED.3IONS-SCNC: 13 MMOL/L (ref 5–15)
APTT PPP: 34.3 SECONDS (ref 24.5–36)
AST SERPL-CCNC: 23 U/L (ref 1–40)
BACTERIA UR QL AUTO: NORMAL /HPF
BILIRUB SERPL-MCNC: 0.5 MG/DL (ref 0–1.2)
BILIRUB UR QL STRIP: NEGATIVE
BUN SERPL-MCNC: 19 MG/DL (ref 8–23)
BUN/CREAT SERPL: 18.3 (ref 7–25)
CALCIUM SPEC-SCNC: 9.5 MG/DL (ref 8.6–10.5)
CHLORIDE SERPL-SCNC: 101 MMOL/L (ref 98–107)
CLARITY UR: CLEAR
CO2 SERPL-SCNC: 23 MMOL/L (ref 22–29)
COLOR UR: ABNORMAL
CREAT SERPL-MCNC: 1.04 MG/DL (ref 0.76–1.27)
DEPRECATED RDW RBC AUTO: 41.6 FL (ref 37–54)
EGFRCR SERPLBLD CKD-EPI 2021: 81.2 ML/MIN/1.73
ERYTHROCYTE [DISTWIDTH] IN BLOOD BY AUTOMATED COUNT: 12.7 % (ref 12.3–15.4)
GLOBULIN UR ELPH-MCNC: 3.4 GM/DL
GLUCOSE SERPL-MCNC: 142 MG/DL (ref 65–99)
GLUCOSE UR STRIP-MCNC: NEGATIVE MG/DL
HCT VFR BLD AUTO: 42.9 % (ref 37.5–51)
HGB BLD-MCNC: 14.8 G/DL (ref 13–17.7)
HGB UR QL STRIP.AUTO: NEGATIVE
HYALINE CASTS UR QL AUTO: NORMAL /LPF
INR PPP: 1 (ref 0.91–1.09)
KETONES UR QL STRIP: ABNORMAL
LEUKOCYTE ESTERASE UR QL STRIP.AUTO: NEGATIVE
MCH RBC QN AUTO: 30.8 PG (ref 26.6–33)
MCHC RBC AUTO-ENTMCNC: 34.5 G/DL (ref 31.5–35.7)
MCV RBC AUTO: 89.4 FL (ref 79–97)
NITRITE UR QL STRIP: NEGATIVE
PH UR STRIP.AUTO: 6 [PH] (ref 5–8)
PLATELET # BLD AUTO: 248 10*3/MM3 (ref 140–450)
PMV BLD AUTO: 10.5 FL (ref 6–12)
POTASSIUM SERPL-SCNC: 3.9 MMOL/L (ref 3.5–5.2)
PROT SERPL-MCNC: 7.8 G/DL (ref 6–8.5)
PROT UR QL STRIP: ABNORMAL
PROTHROMBIN TIME: 13.3 SECONDS (ref 11.8–14.8)
RBC # BLD AUTO: 4.8 10*6/MM3 (ref 4.14–5.8)
RBC # UR STRIP: NORMAL /HPF
REF LAB TEST METHOD: NORMAL
SODIUM SERPL-SCNC: 137 MMOL/L (ref 136–145)
SP GR UR STRIP: 1.03 (ref 1–1.03)
SQUAMOUS #/AREA URNS HPF: NORMAL /HPF
UROBILINOGEN UR QL STRIP: ABNORMAL
WBC # UR STRIP: NORMAL /HPF
WBC NRBC COR # BLD AUTO: 9.46 10*3/MM3 (ref 3.4–10.8)

## 2024-02-01 PROCEDURE — 71045 X-RAY EXAM CHEST 1 VIEW: CPT

## 2024-02-01 PROCEDURE — 93010 ELECTROCARDIOGRAM REPORT: CPT | Performed by: INTERNAL MEDICINE

## 2024-02-01 PROCEDURE — 85027 COMPLETE CBC AUTOMATED: CPT

## 2024-02-01 PROCEDURE — 93005 ELECTROCARDIOGRAM TRACING: CPT

## 2024-02-01 PROCEDURE — 36415 COLL VENOUS BLD VENIPUNCTURE: CPT

## 2024-02-01 PROCEDURE — 85730 THROMBOPLASTIN TIME PARTIAL: CPT

## 2024-02-01 PROCEDURE — 80053 COMPREHEN METABOLIC PANEL: CPT

## 2024-02-01 PROCEDURE — 81001 URINALYSIS AUTO W/SCOPE: CPT

## 2024-02-01 PROCEDURE — 85610 PROTHROMBIN TIME: CPT

## 2024-02-01 RX ORDER — ACETAMINOPHEN 325 MG/1
650 TABLET ORAL EVERY 6 HOURS PRN
COMMUNITY

## 2024-02-01 NOTE — DISCHARGE INSTRUCTIONS
Before you come to the hospital        Arrival time: AS DIRECTED BY OFFICE     YOU MAY TAKE THE FOLLOWING MEDICATION(S) THE MORNING OF SURGERY WITH A SIP OF WATER: Gabapentin    Hold Losartan for 24 Hours prior to surgery           ALL OTHER HOME MEDICATION CHECK WITH YOUR PHYSICIAN (especially if   you are taking diabetes medicines or blood thinners)    Do not take any Erectile Dysfunction medications (EX: CIALIS, VIAGRA) 24 hours prior to surgery.      If you were given and instructed to use a germ- killing soap, use as directed the night before surgery and again the morning of surgery or as directed by your surgeon. (Use one-half of the bottle with each shower.)   See attached information for How to Use Chlorhexidine for Bathing if applicable.            Eating and drinking restrictions prior to scheduled arrival time    2 Hours before arrival time STOP   Drinking Clear liquids (water, black coffee-NO CREAM,  apple juice-no pulp)    Clear Liquids    Water and flavored water                                                                      Clear Fruit juices, such as cranberry juice and apple juice.  Black coffee (NO cream of any kind, including powdered).  Plain tea  Clear bouillon or broth.  Flavored gelatin.  Soda.  Gatorade or Powerade.    8 Hours before arrival time STOP   All food, full liquids, and dairy products  Full liquid examples  Juices that have pulp.  Frozen ice pops that contain fruit pieces.  Coffee with creamer  Milk.  Yogurt.    (It is extremely important that you follow these guidelines to prevent delay or cancelation of your procedure)                       MANAGING PAIN AFTER SURGERY    We know you are probably wondering what your pain will be like after surgery.  Following surgery it is unrealistic to expect you will not have pain.   Pain is how our bodies let us know that something is wrong or cautions us to be careful.  That said, our goal is to make your pain tolerable.    Methods we  may use to treat your pain include (oral or IV medications, PCAs, epidurals, nerve blocks, etc.)   While some procedures require IV pain medications for a short time after surgery, transitioning to pain medications by mouth allows for better management of pain.   Your nurse will encourage you to take oral pain medications whenever possible.  IV medications work almost immediately, but only last a short while.  Taking medications by mouth allows for a more constant level of medication in your blood stream for a longer period of time.      Once your pain is out of control it is harder to get back under control.  It is important you are aware when your next dose of pain medication is due.  If you are admitted, your nurse may write the time of your next dose on the white board in your room to help you remember.      We are interested in your pain and encourage you to inform us about aggravating factors during your visit.   Many times a simple repositioning every few hours can make a big difference.    If your physician says it is okay, do not let your pain prevent you from getting out of bed. Be sure to call your nurse for assistance prior to getting up so you do not fall.      Before surgery, please decide your tolerable pain goal.  These faces help describe the pain ratings we use on a 0-10 scale.   Be prepared to tell us your goal and whether or not you take pain or anxiety medications at home.          Preparing for Surgery  Preparing for surgery is an important part of your care. It can make things go more smoothly and help you avoid complications. The steps leading up to surgery may vary among hospitals. Follow all instructions given to you by your health care providers. Ask questions if you do not understand something. Talk about any concerns that you have.  Here are some questions to consider asking before your surgery:  If my surgery is not an emergency (is elective), when would be the best time to have the  surgery?  What arrangements do I need to make for work, home, or school?  What will my recovery be like? How long will it be before I can return to normal activities?  Will I need to prepare my home? Will I need to arrange care for me or my children?  Should I expect to have pain after surgery? What are my pain management options? Are there nonmedical options that I can try for pain?  Tell a health care provider about:  Any allergies you have.  All medicines you are taking, including vitamins, herbs, eye drops, creams, and over-the-counter medicines.  Any problems you or family members have had with anesthetic medicines.  Any blood disorders you have.  Any surgeries you have had.  Any medical conditions you have.  Whether you are pregnant or may be pregnant.  What are the risks?  The risks and complications of surgery depend on the specific procedure that you have. Discuss all the risks with your health care providers before your surgery. Ask about common surgical complications, which may include:  Infection.  Bleeding or a need for blood replacement (transfusion).  Allergic reactions to medicines.  Damage to surrounding nerves, tissues, or structures.  A blood clot.  Scarring.  Failure of the surgery to correct the problem.  Follow these instructions before the procedure:  Several days or weeks before your procedure  You may have a physical exam by your primary health care provider to make sure it is safe for you to have surgery.  You may have testing. This may include a chest X-ray, blood and urine tests, electrocardiogram (ECG), or other testing.  Ask your health care provider about:  Changing or stopping your regular medicines. This is especially important if you are taking diabetes medicines or blood thinners.  Taking medicines such as aspirin and ibuprofen. These medicines can thin your blood. Do not take these medicines unless your health care provider tells you to take them.  Taking over-the-counter  medicines, vitamins, herbs, and supplements.  Do not use any products that contain nicotine or tobacco, such as cigarettes and e-cigarettes. If you need help quitting, ask your health care provider.  Avoid alcohol.  Ask your health care provider if there are exercises you can do to prepare for surgery.  Eat a healthy diet.   Plan to have someone 18 years of age or older to take you home from the hospital. We will need to verify your ride on the morning of surgery if you are being discharged home on the same day. Tell your ride to be expecting a call from the hospital prior to your procedure.   Plan to have a responsible adult care for you for at least 24 hours after you leave the hospital or clinic. This is important.  The day before your procedure  You may be given antibiotic medicine to take by mouth to help prevent infection. Take it as told by your health care provider.  You may be asked to shower with a germ-killing soap.  Follow instructions from your health care provider about eating and drinking restrictions. This includes gum, mints and hard candy.  Pack comfortable clothes according to your procedure.   The day of your procedure  You may need to take another shower with a germ-killing soap before you leave home in the morning.  With a small sip of water, take only the medicines that you are told to take.  Remove all jewelry including rings.   Leave anything you consider valuable at home except hearing aids if needed.  You do not need to bring your home medications into the hospital.   Do not wear any makeup, nail polish, powder, deodorant, lotion, hair accessories, or anything on your skin or body except your clothes.  If you will be staying in the hospital, bring a case to hold your glasses, contacts, or dentures. You may also want to bring your robe and non-skid footwear.       (Do not use denture adhesives since you will be asked to remove them during  surgery).   If you wear oxygen at home, bring it  with you the day of surgery.  If instructed by your health care provider, bring your sleep apnea device with you on the day of your surgery (if this applies to you).  You may want to leave your suitcase and sleep apnea device in the car until after surgery.   Arrive at the hospital as scheduled.  Bring a friend or family member with you who can help to answer questions and be present while you meet with your health care provider.  At the hospital  When you arrive at the hospital:  Go to registration located at the main entrance of the hospital. You will be registered and given a beeper and a sticker sheet. Take the stickers to the Outpatient nurses desk and place in the black tray. This is to notify staff that you have arrived. Then return to the lobby to wait.   When your beeper lights up and vibrates proceed through the double doors, under the stairs, and a member of the Outpatient Surgery staff will escort you to your preoperative room.  You may have to wear compression sleeves. These help to prevent blood clots and reduce swelling in your legs.  An IV may be inserted into one of your veins.              In the operating room, you may be given one or more of the following:        A medicine to help you relax (sedative).        A medicine to numb the area (local anesthetic).        A medicine to make you fall asleep (general anesthetic).        A medicine that is injected into an area of your body to numb everything below the                      injection site (regional anesthetic).  You may be given an antibiotic through your IV to help prevent infection.  Your surgical site will be marked or identified.    Contact a health care provider if you:  Develop a fever of more than 100.4°F (38°C) or other feelings of illness during the 48 hours before your surgery.  Have symptoms that get worse.  Have questions or concerns about your surgery.  Summary  Preparing for surgery can make the procedure go more smoothly and  lower your risk of complications.  Before surgery, make a list of questions and concerns to discuss with your surgeon. Ask about the risks and possible complications.  In the days or weeks before your surgery, follow all instructions from your health care provider. You may need to stop smoking, avoid alcohol, follow eating restrictions, and change or stop your regular medicines.  Contact your surgeon if you develop a fever or other signs of illness during the few days before your surgery.  This information is not intended to replace advice given to you by your health care provider. Make sure you discuss any questions you have with your health care provider.  Document Revised: 12/21/2018 Document Reviewed: 10/23/2018  [employer] Patient Education © 2021 [employer]

## 2024-02-03 LAB
QT INTERVAL: 412 MS
QTC INTERVAL: 438 MS

## 2024-02-14 ENCOUNTER — APPOINTMENT (OUTPATIENT)
Dept: GENERAL RADIOLOGY | Facility: HOSPITAL | Age: 64
DRG: 455 | End: 2024-02-14
Payer: COMMERCIAL

## 2024-02-14 ENCOUNTER — HOSPITAL ENCOUNTER (INPATIENT)
Facility: HOSPITAL | Age: 64
LOS: 3 days | Discharge: HOME | DRG: 455 | End: 2024-02-17
Attending: ORTHOPAEDIC SURGERY | Admitting: ORTHOPAEDIC SURGERY
Payer: COMMERCIAL

## 2024-02-14 ENCOUNTER — ANESTHESIA EVENT (OUTPATIENT)
Dept: PERIOP | Facility: HOSPITAL | Age: 64
End: 2024-02-14
Payer: COMMERCIAL

## 2024-02-14 ENCOUNTER — ANESTHESIA (OUTPATIENT)
Dept: PERIOP | Facility: HOSPITAL | Age: 64
End: 2024-02-14
Payer: COMMERCIAL

## 2024-02-14 DIAGNOSIS — Z74.09 IMPAIRED MOBILITY: Primary | ICD-10-CM

## 2024-02-14 DIAGNOSIS — M48.061 SPINAL STENOSIS OF LUMBAR REGION WITHOUT NEUROGENIC CLAUDICATION: ICD-10-CM

## 2024-02-14 LAB
ABO GROUP BLD: NORMAL
BLD GP AB SCN SERPL QL: NEGATIVE
GLUCOSE BLDC GLUCOMTR-MCNC: 119 MG/DL (ref 70–130)
GLUCOSE BLDC GLUCOMTR-MCNC: 133 MG/DL (ref 70–130)
RH BLD: POSITIVE
T&S EXPIRATION DATE: NORMAL

## 2024-02-14 PROCEDURE — 82948 REAGENT STRIP/BLOOD GLUCOSE: CPT

## 2024-02-14 PROCEDURE — 25010000002 DEXAMETHASONE PER 1 MG: Performed by: NURSE ANESTHETIST, CERTIFIED REGISTERED

## 2024-02-14 PROCEDURE — C1769 GUIDE WIRE: HCPCS | Performed by: ORTHOPAEDIC SURGERY

## 2024-02-14 PROCEDURE — C1713 ANCHOR/SCREW BN/BN,TIS/BN: HCPCS | Performed by: ORTHOPAEDIC SURGERY

## 2024-02-14 PROCEDURE — 0ST20ZZ RESECTION OF LUMBAR VERTEBRAL DISC, OPEN APPROACH: ICD-10-PCS | Performed by: ORTHOPAEDIC SURGERY

## 2024-02-14 PROCEDURE — 86900 BLOOD TYPING SEROLOGIC ABO: CPT | Performed by: ORTHOPAEDIC SURGERY

## 2024-02-14 PROCEDURE — 25010000002 CEFAZOLIN PER 500 MG: Performed by: ORTHOPAEDIC SURGERY

## 2024-02-14 PROCEDURE — 25010000002 SUGAMMADEX 200 MG/2ML SOLUTION: Performed by: NURSE ANESTHETIST, CERTIFIED REGISTERED

## 2024-02-14 PROCEDURE — 86850 RBC ANTIBODY SCREEN: CPT | Performed by: ORTHOPAEDIC SURGERY

## 2024-02-14 PROCEDURE — 25810000003 LACTATED RINGERS PER 1000 ML: Performed by: ORTHOPAEDIC SURGERY

## 2024-02-14 PROCEDURE — 25010000002 FENTANYL CITRATE (PF) 250 MCG/5ML SOLUTION: Performed by: NURSE ANESTHETIST, CERTIFIED REGISTERED

## 2024-02-14 PROCEDURE — 25010000002 DROPERIDOL PER 5 MG: Performed by: ANESTHESIOLOGY

## 2024-02-14 PROCEDURE — 25010000002 ONDANSETRON PER 1 MG: Performed by: NURSE ANESTHETIST, CERTIFIED REGISTERED

## 2024-02-14 PROCEDURE — 25810000003 SODIUM CHLORIDE 0.9 % SOLUTION: Performed by: ORTHOPAEDIC SURGERY

## 2024-02-14 PROCEDURE — 76000 FLUOROSCOPY <1 HR PHYS/QHP: CPT

## 2024-02-14 PROCEDURE — 4A11X4G MONITORING OF PERIPHERAL NERVOUS ELECTRICAL ACTIVITY, INTRAOPERATIVE, EXTERNAL APPROACH: ICD-10-PCS | Performed by: ORTHOPAEDIC SURGERY

## 2024-02-14 PROCEDURE — 25010000002 LABETALOL 5 MG/ML SOLUTION: Performed by: ANESTHESIOLOGY

## 2024-02-14 PROCEDURE — 25010000002 PROPOFOL 10 MG/ML EMULSION: Performed by: NURSE ANESTHETIST, CERTIFIED REGISTERED

## 2024-02-14 PROCEDURE — 25010000002 FENTANYL CITRATE (PF) 50 MCG/ML SOLUTION: Performed by: ANESTHESIOLOGY

## 2024-02-14 PROCEDURE — 86901 BLOOD TYPING SEROLOGIC RH(D): CPT | Performed by: ORTHOPAEDIC SURGERY

## 2024-02-14 PROCEDURE — 72100 X-RAY EXAM L-S SPINE 2/3 VWS: CPT

## 2024-02-14 PROCEDURE — 0SG10A0 FUSION OF 2 OR MORE LUMBAR VERTEBRAL JOINTS WITH INTERBODY FUSION DEVICE, ANTERIOR APPROACH, ANTERIOR COLUMN, OPEN APPROACH: ICD-10-PCS | Performed by: ORTHOPAEDIC SURGERY

## 2024-02-14 PROCEDURE — 25010000002 HYDROMORPHONE PER 4 MG: Performed by: ANESTHESIOLOGY

## 2024-02-14 RX ORDER — NALOXONE HCL 0.4 MG/ML
0.04 VIAL (ML) INJECTION AS NEEDED
Status: DISCONTINUED | OUTPATIENT
Start: 2024-02-14 | End: 2024-02-14 | Stop reason: HOSPADM

## 2024-02-14 RX ORDER — DIPHENHYDRAMINE HCL 25 MG
25 CAPSULE ORAL NIGHTLY PRN
Status: DISCONTINUED | OUTPATIENT
Start: 2024-02-14 | End: 2024-02-17 | Stop reason: HOSPADM

## 2024-02-14 RX ORDER — FAMOTIDINE 10 MG/ML
20 INJECTION, SOLUTION INTRAVENOUS EVERY 12 HOURS SCHEDULED
Status: DISCONTINUED | OUTPATIENT
Start: 2024-02-14 | End: 2024-02-17 | Stop reason: HOSPADM

## 2024-02-14 RX ORDER — ATORVASTATIN CALCIUM 10 MG/1
20 TABLET, FILM COATED ORAL DAILY
Status: DISCONTINUED | OUTPATIENT
Start: 2024-02-14 | End: 2024-02-17 | Stop reason: HOSPADM

## 2024-02-14 RX ORDER — MAGNESIUM HYDROXIDE 1200 MG/15ML
LIQUID ORAL AS NEEDED
Status: DISCONTINUED | OUTPATIENT
Start: 2024-02-14 | End: 2024-02-14 | Stop reason: HOSPADM

## 2024-02-14 RX ORDER — DROPERIDOL 2.5 MG/ML
0.62 INJECTION, SOLUTION INTRAMUSCULAR; INTRAVENOUS ONCE AS NEEDED
Status: COMPLETED | OUTPATIENT
Start: 2024-02-14 | End: 2024-02-14

## 2024-02-14 RX ORDER — SODIUM CHLORIDE 9 MG/ML
75 INJECTION, SOLUTION INTRAVENOUS CONTINUOUS
Status: DISPENSED | OUTPATIENT
Start: 2024-02-14 | End: 2024-02-15

## 2024-02-14 RX ORDER — IBUPROFEN 600 MG/1
600 TABLET ORAL ONCE AS NEEDED
Status: DISCONTINUED | OUTPATIENT
Start: 2024-02-14 | End: 2024-02-14 | Stop reason: HOSPADM

## 2024-02-14 RX ORDER — SODIUM CHLORIDE, SODIUM LACTATE, POTASSIUM CHLORIDE, CALCIUM CHLORIDE 600; 310; 30; 20 MG/100ML; MG/100ML; MG/100ML; MG/100ML
1000 INJECTION, SOLUTION INTRAVENOUS CONTINUOUS
Status: DISPENSED | OUTPATIENT
Start: 2024-02-14 | End: 2024-02-16

## 2024-02-14 RX ORDER — ONDANSETRON 2 MG/ML
4 INJECTION INTRAMUSCULAR; INTRAVENOUS EVERY 6 HOURS PRN
Status: DISCONTINUED | OUTPATIENT
Start: 2024-02-14 | End: 2024-02-17 | Stop reason: HOSPADM

## 2024-02-14 RX ORDER — HYDROCODONE BITARTRATE AND ACETAMINOPHEN 7.5; 325 MG/1; MG/1
1 TABLET ORAL EVERY 6 HOURS PRN
Status: DISCONTINUED | OUTPATIENT
Start: 2024-02-14 | End: 2024-02-17 | Stop reason: HOSPADM

## 2024-02-14 RX ORDER — SODIUM CHLORIDE, SODIUM LACTATE, POTASSIUM CHLORIDE, CALCIUM CHLORIDE 600; 310; 30; 20 MG/100ML; MG/100ML; MG/100ML; MG/100ML
100 INJECTION, SOLUTION INTRAVENOUS CONTINUOUS PRN
Status: DISCONTINUED | OUTPATIENT
Start: 2024-02-14 | End: 2024-02-17 | Stop reason: HOSPADM

## 2024-02-14 RX ORDER — SODIUM CHLORIDE 0.9 % (FLUSH) 0.9 %
10 SYRINGE (ML) INJECTION AS NEEDED
Status: DISCONTINUED | OUTPATIENT
Start: 2024-02-14 | End: 2024-02-14 | Stop reason: HOSPADM

## 2024-02-14 RX ORDER — SODIUM CHLORIDE 9 MG/ML
75 INJECTION, SOLUTION INTRAVENOUS CONTINUOUS
Status: DISPENSED | OUTPATIENT
Start: 2024-02-14 | End: 2024-02-16

## 2024-02-14 RX ORDER — ROCURONIUM BROMIDE 10 MG/ML
INJECTION, SOLUTION INTRAVENOUS AS NEEDED
Status: DISCONTINUED | OUTPATIENT
Start: 2024-02-14 | End: 2024-02-14 | Stop reason: SURG

## 2024-02-14 RX ORDER — LABETALOL HYDROCHLORIDE 5 MG/ML
5 INJECTION, SOLUTION INTRAVENOUS
Status: DISCONTINUED | OUTPATIENT
Start: 2024-02-14 | End: 2024-02-14 | Stop reason: HOSPADM

## 2024-02-14 RX ORDER — ONDANSETRON 2 MG/ML
INJECTION INTRAMUSCULAR; INTRAVENOUS AS NEEDED
Status: DISCONTINUED | OUTPATIENT
Start: 2024-02-14 | End: 2024-02-14 | Stop reason: SURG

## 2024-02-14 RX ORDER — FAMOTIDINE 20 MG/1
20 TABLET, FILM COATED ORAL EVERY 12 HOURS SCHEDULED
Status: DISCONTINUED | OUTPATIENT
Start: 2024-02-14 | End: 2024-02-17 | Stop reason: HOSPADM

## 2024-02-14 RX ORDER — SODIUM CHLORIDE 0.9 % (FLUSH) 0.9 %
10 SYRINGE (ML) INJECTION EVERY 12 HOURS SCHEDULED
Status: DISCONTINUED | OUTPATIENT
Start: 2024-02-14 | End: 2024-02-14 | Stop reason: HOSPADM

## 2024-02-14 RX ORDER — GABAPENTIN 300 MG/1
300 CAPSULE ORAL 3 TIMES DAILY
Status: DISCONTINUED | OUTPATIENT
Start: 2024-02-14 | End: 2024-02-17 | Stop reason: HOSPADM

## 2024-02-14 RX ORDER — HYDROMORPHONE HYDROCHLORIDE 1 MG/ML
0.5 INJECTION, SOLUTION INTRAMUSCULAR; INTRAVENOUS; SUBCUTANEOUS
Status: DISCONTINUED | OUTPATIENT
Start: 2024-02-14 | End: 2024-02-14 | Stop reason: HOSPADM

## 2024-02-14 RX ORDER — ONDANSETRON 2 MG/ML
4 INJECTION INTRAMUSCULAR; INTRAVENOUS
Status: DISCONTINUED | OUTPATIENT
Start: 2024-02-14 | End: 2024-02-14 | Stop reason: HOSPADM

## 2024-02-14 RX ORDER — HYDROCHLOROTHIAZIDE 25 MG/1
12.5 TABLET ORAL DAILY
Status: DISCONTINUED | OUTPATIENT
Start: 2024-02-14 | End: 2024-02-17 | Stop reason: HOSPADM

## 2024-02-14 RX ORDER — DEXAMETHASONE SODIUM PHOSPHATE 4 MG/ML
INJECTION, SOLUTION INTRA-ARTICULAR; INTRALESIONAL; INTRAMUSCULAR; INTRAVENOUS; SOFT TISSUE AS NEEDED
Status: DISCONTINUED | OUTPATIENT
Start: 2024-02-14 | End: 2024-02-14 | Stop reason: SURG

## 2024-02-14 RX ORDER — FENTANYL CITRATE 50 UG/ML
INJECTION, SOLUTION INTRAMUSCULAR; INTRAVENOUS AS NEEDED
Status: DISCONTINUED | OUTPATIENT
Start: 2024-02-14 | End: 2024-02-14 | Stop reason: SURG

## 2024-02-14 RX ORDER — LABETALOL HYDROCHLORIDE 5 MG/ML
5 INJECTION, SOLUTION INTRAVENOUS
Status: DISCONTINUED | OUTPATIENT
Start: 2024-02-14 | End: 2024-02-14

## 2024-02-14 RX ORDER — FENTANYL CITRATE 50 UG/ML
25 INJECTION, SOLUTION INTRAMUSCULAR; INTRAVENOUS
Status: COMPLETED | OUTPATIENT
Start: 2024-02-14 | End: 2024-02-14

## 2024-02-14 RX ORDER — ACETAMINOPHEN 325 MG/1
650 TABLET ORAL EVERY 6 HOURS PRN
Status: DISCONTINUED | OUTPATIENT
Start: 2024-02-14 | End: 2024-02-17 | Stop reason: HOSPADM

## 2024-02-14 RX ORDER — ONDANSETRON 4 MG/1
4 TABLET, ORALLY DISINTEGRATING ORAL EVERY 6 HOURS PRN
Status: DISCONTINUED | OUTPATIENT
Start: 2024-02-14 | End: 2024-02-17 | Stop reason: HOSPADM

## 2024-02-14 RX ORDER — KETAMINE HCL IN NACL, ISO-OSM 100MG/10ML
SYRINGE (ML) INJECTION AS NEEDED
Status: DISCONTINUED | OUTPATIENT
Start: 2024-02-14 | End: 2024-02-14 | Stop reason: SURG

## 2024-02-14 RX ORDER — GLIPIZIDE 5 MG/1
5 TABLET ORAL DAILY
Status: DISCONTINUED | OUTPATIENT
Start: 2024-02-14 | End: 2024-02-15

## 2024-02-14 RX ORDER — SODIUM CHLORIDE 9 MG/ML
40 INJECTION, SOLUTION INTRAVENOUS AS NEEDED
Status: DISCONTINUED | OUTPATIENT
Start: 2024-02-14 | End: 2024-02-14 | Stop reason: HOSPADM

## 2024-02-14 RX ORDER — BUPIVACAINE HCL/0.9 % NACL/PF 0.125 %
PLASTIC BAG, INJECTION (ML) EPIDURAL AS NEEDED
Status: DISCONTINUED | OUTPATIENT
Start: 2024-02-14 | End: 2024-02-14 | Stop reason: SURG

## 2024-02-14 RX ORDER — FENTANYL CITRATE 50 UG/ML
25 INJECTION, SOLUTION INTRAMUSCULAR; INTRAVENOUS
Status: DISCONTINUED | OUTPATIENT
Start: 2024-02-14 | End: 2024-02-14 | Stop reason: HOSPADM

## 2024-02-14 RX ORDER — SODIUM CHLORIDE, SODIUM LACTATE, POTASSIUM CHLORIDE, CALCIUM CHLORIDE 600; 310; 30; 20 MG/100ML; MG/100ML; MG/100ML; MG/100ML
9 INJECTION, SOLUTION INTRAVENOUS CONTINUOUS
Status: DISCONTINUED | OUTPATIENT
Start: 2024-02-14 | End: 2024-02-17 | Stop reason: HOSPADM

## 2024-02-14 RX ORDER — DEXTROSE MONOHYDRATE 25 G/50ML
12.5 INJECTION, SOLUTION INTRAVENOUS AS NEEDED
Status: DISCONTINUED | OUTPATIENT
Start: 2024-02-14 | End: 2024-02-14 | Stop reason: HOSPADM

## 2024-02-14 RX ORDER — CLONIDINE HYDROCHLORIDE 0.1 MG/1
0.1 TABLET ORAL 2 TIMES DAILY
Status: DISCONTINUED | OUTPATIENT
Start: 2024-02-14 | End: 2024-02-17 | Stop reason: HOSPADM

## 2024-02-14 RX ORDER — LIDOCAINE HYDROCHLORIDE 10 MG/ML
0.5 INJECTION, SOLUTION EPIDURAL; INFILTRATION; INTRACAUDAL; PERINEURAL ONCE AS NEEDED
Status: DISCONTINUED | OUTPATIENT
Start: 2024-02-14 | End: 2024-02-14 | Stop reason: HOSPADM

## 2024-02-14 RX ORDER — PROPOFOL 10 MG/ML
VIAL (ML) INTRAVENOUS AS NEEDED
Status: DISCONTINUED | OUTPATIENT
Start: 2024-02-14 | End: 2024-02-14 | Stop reason: SURG

## 2024-02-14 RX ORDER — OXYCODONE HCL 20 MG/1
20 TABLET, FILM COATED, EXTENDED RELEASE ORAL ONCE
Status: COMPLETED | OUTPATIENT
Start: 2024-02-14 | End: 2024-02-14

## 2024-02-14 RX ORDER — TIZANIDINE 4 MG/1
4 TABLET ORAL EVERY 8 HOURS PRN
Status: DISCONTINUED | OUTPATIENT
Start: 2024-02-14 | End: 2024-02-17 | Stop reason: HOSPADM

## 2024-02-14 RX ORDER — FLUMAZENIL 0.1 MG/ML
0.2 INJECTION INTRAVENOUS AS NEEDED
Status: DISCONTINUED | OUTPATIENT
Start: 2024-02-14 | End: 2024-02-14 | Stop reason: HOSPADM

## 2024-02-14 RX ORDER — SODIUM CHLORIDE 9 MG/ML
40 INJECTION, SOLUTION INTRAVENOUS AS NEEDED
Status: DISCONTINUED | OUTPATIENT
Start: 2024-02-14 | End: 2024-02-17 | Stop reason: HOSPADM

## 2024-02-14 RX ORDER — SODIUM CHLORIDE 0.9 % (FLUSH) 0.9 %
3 SYRINGE (ML) INJECTION EVERY 12 HOURS SCHEDULED
Status: DISCONTINUED | OUTPATIENT
Start: 2024-02-14 | End: 2024-02-17 | Stop reason: HOSPADM

## 2024-02-14 RX ORDER — OXYCODONE AND ACETAMINOPHEN 10; 325 MG/1; MG/1
1 TABLET ORAL ONCE AS NEEDED
Status: COMPLETED | OUTPATIENT
Start: 2024-02-14 | End: 2024-02-14

## 2024-02-14 RX ORDER — LIDOCAINE HYDROCHLORIDE 20 MG/ML
INJECTION, SOLUTION EPIDURAL; INFILTRATION; INTRACAUDAL; PERINEURAL AS NEEDED
Status: DISCONTINUED | OUTPATIENT
Start: 2024-02-14 | End: 2024-02-14 | Stop reason: SURG

## 2024-02-14 RX ORDER — SODIUM CHLORIDE 0.9 % (FLUSH) 0.9 %
10 SYRINGE (ML) INJECTION AS NEEDED
Status: DISCONTINUED | OUTPATIENT
Start: 2024-02-14 | End: 2024-02-17 | Stop reason: HOSPADM

## 2024-02-14 RX ORDER — SODIUM CHLORIDE 0.9 % (FLUSH) 0.9 %
3 SYRINGE (ML) INJECTION AS NEEDED
Status: DISCONTINUED | OUTPATIENT
Start: 2024-02-14 | End: 2024-02-14 | Stop reason: HOSPADM

## 2024-02-14 RX ORDER — LOSARTAN POTASSIUM 50 MG/1
25 TABLET ORAL DAILY
Status: DISCONTINUED | OUTPATIENT
Start: 2024-02-14 | End: 2024-02-17 | Stop reason: HOSPADM

## 2024-02-14 RX ADMIN — FENTANYL CITRATE 100 MCG: 0.05 INJECTION, SOLUTION INTRAMUSCULAR; INTRAVENOUS at 13:08

## 2024-02-14 RX ADMIN — HYDROMORPHONE HYDROCHLORIDE 0.5 MG: 1 INJECTION, SOLUTION INTRAMUSCULAR; INTRAVENOUS; SUBCUTANEOUS at 15:41

## 2024-02-14 RX ADMIN — OXYCODONE HYDROCHLORIDE AND ACETAMINOPHEN 1 TABLET: 10; 325 TABLET ORAL at 15:54

## 2024-02-14 RX ADMIN — FENTANYL CITRATE 25 MCG: 50 INJECTION, SOLUTION INTRAMUSCULAR; INTRAVENOUS at 15:25

## 2024-02-14 RX ADMIN — HYDROCHLOROTHIAZIDE 12.5 MG: 25 TABLET ORAL at 17:49

## 2024-02-14 RX ADMIN — HYDROMORPHONE HYDROCHLORIDE 0.5 MG: 1 INJECTION, SOLUTION INTRAMUSCULAR; INTRAVENOUS; SUBCUTANEOUS at 15:00

## 2024-02-14 RX ADMIN — FENTANYL CITRATE 25 MCG: 50 INJECTION, SOLUTION INTRAMUSCULAR; INTRAVENOUS at 15:20

## 2024-02-14 RX ADMIN — SUGAMMADEX 100 MG: 100 INJECTION, SOLUTION INTRAVENOUS at 14:25

## 2024-02-14 RX ADMIN — FENTANYL CITRATE 25 MCG: 50 INJECTION, SOLUTION INTRAMUSCULAR; INTRAVENOUS at 15:09

## 2024-02-14 RX ADMIN — LOSARTAN POTASSIUM 25 MG: 50 TABLET, FILM COATED ORAL at 17:49

## 2024-02-14 RX ADMIN — LABETALOL HYDROCHLORIDE 5 MG: 5 INJECTION INTRAVENOUS at 15:30

## 2024-02-14 RX ADMIN — OXYCODONE HYDROCHLORIDE 20 MG: 20 TABLET, FILM COATED, EXTENDED RELEASE ORAL at 09:10

## 2024-02-14 RX ADMIN — LABETALOL HYDROCHLORIDE 5 MG: 5 INJECTION INTRAVENOUS at 16:00

## 2024-02-14 RX ADMIN — Medication 100 MCG: at 13:42

## 2024-02-14 RX ADMIN — METFORMIN HCL 1000 MG: 500 TABLET ORAL at 17:49

## 2024-02-14 RX ADMIN — FAMOTIDINE 20 MG: 10 INJECTION INTRAVENOUS at 20:30

## 2024-02-14 RX ADMIN — GABAPENTIN 300 MG: 300 CAPSULE ORAL at 20:30

## 2024-02-14 RX ADMIN — SODIUM CHLORIDE, POTASSIUM CHLORIDE, SODIUM LACTATE AND CALCIUM CHLORIDE 1000 ML: 600; 310; 30; 20 INJECTION, SOLUTION INTRAVENOUS at 09:33

## 2024-02-14 RX ADMIN — HYDROCODONE BITARTRATE AND ACETAMINOPHEN 1 TABLET: 7.5; 325 TABLET ORAL at 17:51

## 2024-02-14 RX ADMIN — HYDROMORPHONE HYDROCHLORIDE 0.5 MG: 1 INJECTION, SOLUTION INTRAMUSCULAR; INTRAVENOUS; SUBCUTANEOUS at 15:13

## 2024-02-14 RX ADMIN — CLONIDINE HYDROCHLORIDE 0.1 MG: 0.1 TABLET ORAL at 16:22

## 2024-02-14 RX ADMIN — FENTANYL CITRATE 100 MCG: 0.05 INJECTION, SOLUTION INTRAMUSCULAR; INTRAVENOUS at 14:15

## 2024-02-14 RX ADMIN — CLONIDINE HYDROCHLORIDE 0.1 MG: 0.1 TABLET ORAL at 20:30

## 2024-02-14 RX ADMIN — ROCURONIUM BROMIDE 20 MG: 10 INJECTION, SOLUTION INTRAVENOUS at 12:28

## 2024-02-14 RX ADMIN — FENTANYL CITRATE 150 MCG: 0.05 INJECTION, SOLUTION INTRAMUSCULAR; INTRAVENOUS at 12:28

## 2024-02-14 RX ADMIN — FENTANYL CITRATE 100 MCG: 0.05 INJECTION, SOLUTION INTRAMUSCULAR; INTRAVENOUS at 12:37

## 2024-02-14 RX ADMIN — SODIUM CHLORIDE 75 ML/HR: 9 INJECTION, SOLUTION INTRAVENOUS at 17:37

## 2024-02-14 RX ADMIN — Medication 3 ML: at 20:31

## 2024-02-14 RX ADMIN — PROPOFOL 200 MG: 10 INJECTION, EMULSION INTRAVENOUS at 12:28

## 2024-02-14 RX ADMIN — ATORVASTATIN CALCIUM 20 MG: 10 TABLET, FILM COATED ORAL at 17:48

## 2024-02-14 RX ADMIN — LIDOCAINE HYDROCHLORIDE 100 MG: 20 INJECTION, SOLUTION EPIDURAL; INFILTRATION; INTRACAUDAL; PERINEURAL at 12:28

## 2024-02-14 RX ADMIN — DROPERIDOL 0.62 MG: 2.5 INJECTION, SOLUTION INTRAMUSCULAR; INTRAVENOUS at 15:09

## 2024-02-14 RX ADMIN — GLIPIZIDE 5 MG: 5 TABLET ORAL at 17:48

## 2024-02-14 RX ADMIN — Medication 100 MCG: at 12:49

## 2024-02-14 RX ADMIN — FENTANYL CITRATE 25 MCG: 50 INJECTION, SOLUTION INTRAMUSCULAR; INTRAVENOUS at 15:03

## 2024-02-14 RX ADMIN — Medication 50 MG: at 12:59

## 2024-02-14 RX ADMIN — SODIUM CHLORIDE 1000 MG: 900 INJECTION INTRAVENOUS at 20:31

## 2024-02-14 RX ADMIN — DEXAMETHASONE SODIUM PHOSPHATE 4 MG: 4 INJECTION, SOLUTION INTRAMUSCULAR; INTRAVENOUS at 13:43

## 2024-02-14 RX ADMIN — CEFAZOLIN 2000 MG: 2 INJECTION, POWDER, FOR SOLUTION INTRAMUSCULAR; INTRAVENOUS at 12:34

## 2024-02-14 RX ADMIN — ONDANSETRON 4 MG: 2 INJECTION INTRAMUSCULAR; INTRAVENOUS at 14:17

## 2024-02-14 NOTE — OP NOTE
LUMBAR LATERAL INTERBODY FUSION  Procedure Note    Manuel Moscoso  2/14/2024    Pre-op Diagnosis:     1. Status post left L4-5 microdiskectomy, 11/18/2019   2. Status post revision left L4-5 microdiskectomy, insertion annular closure device, 11/01/2021   3. Status post anterior decompression, nursing home with instrumentation L5-S1, 12/19/2022   4. Status post left LLIF with instrumentation L4-5, 12/21/2022   5. Status post PSF with instrumentation L4-S1, 12/23/2022  6. Persistent chronic low back pain   7. Bilateral lower extremity weakness   8. Persistent left lower extremity numbness, tingling   9. Adjacent level degenerative disc disease L2-4   10. Facet arthropathy L2-4   11. Retrolisthesis L3-4   12. Chronic central disc herniation L2-3, L3-4   13. Central and foraminal stenosis L2-4   14. History of diabetic peripheral neuropathy   15. History of alpha gal syndrome after tick bite    Post-op Diagnosis:    same    Procedure/CPT® Codes:    1.  Right lateral lumbar interbody fusion L2-3, L3-4  2.  Anterior spinal instrumentation L2-3 (ATEC lateral plate and screws)  3.  Use of titanium interbody biomechanical device for fusion L2-3, L3-4 ([employer] titanium spacer x 1, [employer] titanium spacer x 1)  4.  Use of allograft bone matrix for fusion ([employer])  5.  Use of fluoroscopy for confirmation of surgical level, placement of titanium spacers, and instrumentation  6.  Intraoperative neural monitoring    Anesthesia: General    Surgeon: CASSIE Garcia MD    Assistant: Bhargav Nolen PA-C    Estimated Blood Loss: Minimal    Complications: None    Condition: Stable to PACU.    Indications:    The patient is a 63-year-old who initially underwent a left L4-5 microdiscectomy on 11/18/2019 followed by revision microdiscectomy on 11/1/2021.  He then underwent a staged fusion procedure from L4-S1 in December 2022.  Unfortunately he returned to the office with persistent chronic low back pain along with  bilateral lower extremity weakness and persistent left lower extremity numbness and tingling.  Repeat imaging studies revealed adjacent level degenerative disc disease and facet arthropathy with chronic central disc herniations at both L2-3 and L3-4 causing central and foraminal stenosis.  Of note, the patient did have a history of diabetic peripheral neuropathy as well as alpha gal syndrome after a tick bite.     After failing conservative measures, it was mutually decided that surgery would be the best option. Risks, benefits, and complications of surgery were discussed with the patient. The patient appeared well informed and wished to proceed. We specifically discussed the risk of infection, blood loss, nerve root injury, CSF leak, and the possibility of incomplete resolution of symptoms. We also discussed the possible risk of a nonunion and the potential need for additional surgery in the event of a pseudoarthrosis or hardware failure.     We elected proceed with a staged operation.  Today were performing a lateral fusion from L2 to 4.  It is planned that we will be returning to surgery for a second posterior procedure involving a removal of his existing instrumentation from L4-S1 and a posterior spinal fusion of L2-4 with instrumentation spanning L2-S1.    Operative Procedure:    After obtaining informed consent and verifying the correct operative site, the patient was brought to the operating room and placed supine on an operating table.  A general anesthetic was provided by the anesthesia service with the assistance of an endotracheal tube.  Once this was appropriately positioned and secured, the patient was carefully rotated into the lateral decubitus position with the right side up.  All bony prominences were well-padded.  3 inch wide cloth tape was used to maintain the patient's position.  It was also used to tip open the pelvis slightly allowing better access to the lumbar spine through a lateral  approach.  Fluoroscopy was then used to identify the disc spaces of L2-3 and L3-4, and the skin was marked for our planned incision.  The right flank was then prepped and draped in the usual sterile fashion.  A surgical timeout was taken to confirm this was the correct patient, we were working at the correct levels, and that preoperative antibiotics were given in a timely fashion.    An oblique incision was created of the right flank using a 10 blade scalpel directly centered between the L2-3 and L3-4 segments. Dissection was carried bluntly through subcutaneous tissues. Blunt dissection was also carried between the external oblique and internal oblique musculature. The transversalis fascia was pierced allowing access to the retroperitoneal space. At this point, a series of neuro monitoring probes were used to safely access first the L3-4 level. We used stimulated and free run EMG for this purpose. Once nerve roots were confirmed to be out of harm's way, self retaining retractors were placed for continuous exposure.     An annulotomy was then created at the L3-4 area using a 15 blade scalpel on a long handle. A Fisher elevator was used to remove disc material off of the endplates. Disc material was removed using Kerrisons, pituitaries, and forward angled curettes. Once all disc material had been retrieved, I used the Fisher elevator to divide the contralateral annulus. This assisted with mobilization of the vertebral bodies. We then used a series of endplate scrapers to prepare the endplates for interbody fusion. Trial spacers were malleted into position and for this disc space it was felt that a 22 mm x 55 mm expandable implant would be the best fit to restore disc height. The disc space was then thoroughly irrigated with saline solution.     A titanium spacer from the [employer] instrumentation set measuring 22 mm x 55 mm was then packed with a allograft bone matrix called [employer] as tightly as  possible. This titanium spacer was then placed into the L3-4 disc space under fluoroscopic guidance. It was used as a titanium interbody biomechanical device to assist with interbody fusion.  Once the spacer was properly positioned, I used the appropriate  to expand the spacer is much as possible.  This created lordosis and increased disc height.  I then used a series of graft tubes to fill the now expanded spacer with as much bone graft as possible.  After confirming the spacer was properly positioned in the L3-4 disc space, the area was thoroughly inspected to ensure that we had adequate hemostasis.  Bleeding at this point was controlled using thrombin gelfoam powder and bipolar cautery. The self retaining retractors were then removed and attention was turned L2-3.    Once again the neuro monitoring probes were used in the retroperitoneal space, this time to access L2-3.  We used stimulated and free run EMG for this purpose.  Once nerve roots were confirmed to be out of harm's way, self retaining retractors were placed for continuous exposure of L2-3.  The L2-3 disc space was prepared in identical fashion as L3-4.  We used the Fisher elevators to remove disc material off of the endplates.  Disc material was retrieved using forward angled curettes, pituitaries, and Kerrisons.  We then used a series of endplate scrapers to prepare the endplates for interbody fusion.  The Fisher elevator was used to divide the contralateral annulus helping to mobilize the vertebral bodies.  Trial spacers were then malleted into position.  It was felt for this disc space a 12 mm x 55 mm implant would be the best fit to restore disc height. The disc space was then thoroughly irrigated with saline solution.    A second titanium spacer this time from the [facility] instrumentation set measuring 12 mm x 55 mm x 22 mm was then packed with the same allograft bone matrix as tightly as possible. This titanium spacer was then malleted into the  L2-3 disc space under fluoroscopic guidance. It was placed as a titanium interbody biomechanical device to assist with interbody fusion.  Once this spacer was confirmed to be properly positioned, I chose a 2-hole plate to help augment the fusion of L2-3. This plate was held into position using 2 screws. I placed 5.5 mm by 35 mm screws into both L2 and L3 as fixation.     The wound was then irrigated thoroughly. A thorough inspection was then undertaken to ensure that we had adequate hemostasis. Bleeding at this point was controlled using FloSeal and bipolar cautery.     After ensuring that we had adequate hemostasis in the area, final fluoroscopy imaging was taken to confirm adequate position of both titanium spacers as well as the instrumentation.  There was excellent restoration of disc height and correction of scoliosis compared to preoperative films.  Again we ensure that we had adequate hemostasis by using bipolar cautery and thrombin gelfoam powder.    Closure was then accomplished with a #1 Vicryl reapproximating the transversalis fascia as well as the internal and external oblique musculature. Immediate subcutaneous cutaneous tissues were closed with a 3-0 Vicryl and skin closure was accomplished using Mastisol and Steri-Strips. The wound was then sterilely dressed. The patient was then carefully rotated supine onto a hospital gurney, extubated, and sent to the recovery room in good stable condition.     The patient tolerated the procedure well. There were no complications. We estimated blood loss to be minimal. The patient remained hemodynamically stable.    Intraoperative neuro monitoring was ordered and carried out throughout the procedure to add an increased level of safety for the patient.  The interpreting physician was available by means of real-time continuous, bidirectional, remote audio and visual communication as needed throughout the entire procedure.  Modalities used during the procedure  included SSEP, EEG, MEP, EMG, and TOF.  There were no neuro monitoring signal changes during the procedure.     Bhargav Nolen PA-C provided critical assistance during the procedure. His assistance was medically necessary in order to allow the procedure to occur in the most safe and efficient manner.  He assisted not only with patient positioning and wound closure, but more importantly, he also assisted with disc space preparation, as well as the placement of titanium spacers and instrumentation to obtain a fusion.    CASSIE Garcia MD     Date: 2/14/2024  Time: 14:52 CST

## 2024-02-15 LAB
ANION GAP SERPL CALCULATED.3IONS-SCNC: 12 MMOL/L (ref 5–15)
BASOPHILS # BLD AUTO: 0.02 10*3/MM3 (ref 0–0.2)
BASOPHILS NFR BLD AUTO: 0.2 % (ref 0–1.5)
BUN SERPL-MCNC: 16 MG/DL (ref 8–23)
BUN/CREAT SERPL: 15.7 (ref 7–25)
CALCIUM SPEC-SCNC: 9 MG/DL (ref 8.6–10.5)
CHLORIDE SERPL-SCNC: 103 MMOL/L (ref 98–107)
CO2 SERPL-SCNC: 22 MMOL/L (ref 22–29)
CREAT SERPL-MCNC: 1.02 MG/DL (ref 0.76–1.27)
DEPRECATED RDW RBC AUTO: 42.2 FL (ref 37–54)
EGFRCR SERPLBLD CKD-EPI 2021: 83.1 ML/MIN/1.73
EOSINOPHIL # BLD AUTO: 0 10*3/MM3 (ref 0–0.4)
EOSINOPHIL NFR BLD AUTO: 0 % (ref 0.3–6.2)
ERYTHROCYTE [DISTWIDTH] IN BLOOD BY AUTOMATED COUNT: 12.5 % (ref 12.3–15.4)
GLUCOSE BLDC GLUCOMTR-MCNC: 105 MG/DL (ref 70–130)
GLUCOSE BLDC GLUCOMTR-MCNC: 109 MG/DL (ref 70–130)
GLUCOSE BLDC GLUCOMTR-MCNC: 140 MG/DL (ref 70–130)
GLUCOSE BLDC GLUCOMTR-MCNC: 175 MG/DL (ref 70–130)
GLUCOSE SERPL-MCNC: 163 MG/DL (ref 65–99)
HCT VFR BLD AUTO: 40.9 % (ref 37.5–51)
HGB BLD-MCNC: 14 G/DL (ref 13–17.7)
IMM GRANULOCYTES # BLD AUTO: 0.04 10*3/MM3 (ref 0–0.05)
IMM GRANULOCYTES NFR BLD AUTO: 0.3 % (ref 0–0.5)
LYMPHOCYTES # BLD AUTO: 1.23 10*3/MM3 (ref 0.7–3.1)
LYMPHOCYTES NFR BLD AUTO: 10 % (ref 19.6–45.3)
MCH RBC QN AUTO: 31.5 PG (ref 26.6–33)
MCHC RBC AUTO-ENTMCNC: 34.2 G/DL (ref 31.5–35.7)
MCV RBC AUTO: 92.1 FL (ref 79–97)
MONOCYTES # BLD AUTO: 0.89 10*3/MM3 (ref 0.1–0.9)
MONOCYTES NFR BLD AUTO: 7.2 % (ref 5–12)
NEUTROPHILS NFR BLD AUTO: 10.11 10*3/MM3 (ref 1.7–7)
NEUTROPHILS NFR BLD AUTO: 82.3 % (ref 42.7–76)
NRBC BLD AUTO-RTO: 0 /100 WBC (ref 0–0.2)
PLATELET # BLD AUTO: 213 10*3/MM3 (ref 140–450)
PMV BLD AUTO: 11.5 FL (ref 6–12)
POTASSIUM SERPL-SCNC: 4.4 MMOL/L (ref 3.5–5.2)
RBC # BLD AUTO: 4.44 10*6/MM3 (ref 4.14–5.8)
SODIUM SERPL-SCNC: 137 MMOL/L (ref 136–145)
WBC NRBC COR # BLD AUTO: 12.29 10*3/MM3 (ref 3.4–10.8)

## 2024-02-15 PROCEDURE — 97161 PT EVAL LOW COMPLEX 20 MIN: CPT | Performed by: PHYSICAL THERAPIST

## 2024-02-15 PROCEDURE — 80048 BASIC METABOLIC PNL TOTAL CA: CPT | Performed by: ORTHOPAEDIC SURGERY

## 2024-02-15 PROCEDURE — 25010000002 CEFAZOLIN PER 500 MG: Performed by: ORTHOPAEDIC SURGERY

## 2024-02-15 PROCEDURE — 94799 UNLISTED PULMONARY SVC/PX: CPT

## 2024-02-15 PROCEDURE — 97165 OT EVAL LOW COMPLEX 30 MIN: CPT

## 2024-02-15 PROCEDURE — 63710000001 INSULIN LISPRO (HUMAN) PER 5 UNITS: Performed by: FAMILY MEDICINE

## 2024-02-15 PROCEDURE — 85025 COMPLETE CBC W/AUTO DIFF WBC: CPT | Performed by: ORTHOPAEDIC SURGERY

## 2024-02-15 PROCEDURE — 82948 REAGENT STRIP/BLOOD GLUCOSE: CPT

## 2024-02-15 RX ORDER — DEXTROSE MONOHYDRATE 25 G/50ML
25 INJECTION, SOLUTION INTRAVENOUS
Status: DISCONTINUED | OUTPATIENT
Start: 2024-02-15 | End: 2024-02-17 | Stop reason: HOSPADM

## 2024-02-15 RX ORDER — GLIPIZIDE 5 MG/1
5 TABLET, FILM COATED, EXTENDED RELEASE ORAL DAILY
COMMUNITY

## 2024-02-15 RX ORDER — POLYETHYLENE GLYCOL 3350 17 G/17G
17 POWDER, FOR SOLUTION ORAL 2 TIMES DAILY
Status: DISCONTINUED | OUTPATIENT
Start: 2024-02-15 | End: 2024-02-17 | Stop reason: HOSPADM

## 2024-02-15 RX ORDER — NICOTINE POLACRILEX 4 MG
15 LOZENGE BUCCAL
Status: DISCONTINUED | OUTPATIENT
Start: 2024-02-15 | End: 2024-02-17 | Stop reason: HOSPADM

## 2024-02-15 RX ORDER — IBUPROFEN 600 MG/1
1 TABLET ORAL
Status: DISCONTINUED | OUTPATIENT
Start: 2024-02-15 | End: 2024-02-17 | Stop reason: HOSPADM

## 2024-02-15 RX ORDER — INSULIN LISPRO 100 [IU]/ML
2-7 INJECTION, SOLUTION INTRAVENOUS; SUBCUTANEOUS
Status: DISCONTINUED | OUTPATIENT
Start: 2024-02-15 | End: 2024-02-17 | Stop reason: HOSPADM

## 2024-02-15 RX ORDER — POLYETHYLENE GLYCOL 3350 17 G/17G
17 POWDER, FOR SOLUTION ORAL DAILY
COMMUNITY

## 2024-02-15 RX ADMIN — LOSARTAN POTASSIUM 25 MG: 50 TABLET, FILM COATED ORAL at 08:56

## 2024-02-15 RX ADMIN — GABAPENTIN 300 MG: 300 CAPSULE ORAL at 16:13

## 2024-02-15 RX ADMIN — METFORMIN HCL 1000 MG: 500 TABLET ORAL at 16:48

## 2024-02-15 RX ADMIN — INSULIN LISPRO 2 UNITS: 100 INJECTION, SOLUTION INTRAVENOUS; SUBCUTANEOUS at 21:05

## 2024-02-15 RX ADMIN — HYDROCODONE BITARTRATE AND ACETAMINOPHEN 1 TABLET: 7.5; 325 TABLET ORAL at 17:43

## 2024-02-15 RX ADMIN — HYDROCHLOROTHIAZIDE 12.5 MG: 25 TABLET ORAL at 08:57

## 2024-02-15 RX ADMIN — POLYETHYLENE GLYCOL 3350 17 G: 17 POWDER, FOR SOLUTION ORAL at 21:05

## 2024-02-15 RX ADMIN — GABAPENTIN 300 MG: 300 CAPSULE ORAL at 21:05

## 2024-02-15 RX ADMIN — POLYETHYLENE GLYCOL 3350 17 G: 17 POWDER, FOR SOLUTION ORAL at 09:09

## 2024-02-15 RX ADMIN — SODIUM CHLORIDE 1000 MG: 900 INJECTION INTRAVENOUS at 12:24

## 2024-02-15 RX ADMIN — CLONIDINE HYDROCHLORIDE 0.1 MG: 0.1 TABLET ORAL at 08:58

## 2024-02-15 RX ADMIN — METFORMIN HCL 1000 MG: 500 TABLET ORAL at 08:01

## 2024-02-15 RX ADMIN — FAMOTIDINE 20 MG: 20 TABLET, FILM COATED ORAL at 08:58

## 2024-02-15 RX ADMIN — GABAPENTIN 300 MG: 300 CAPSULE ORAL at 09:09

## 2024-02-15 RX ADMIN — Medication 3 ML: at 08:58

## 2024-02-15 RX ADMIN — Medication 3 ML: at 21:05

## 2024-02-15 RX ADMIN — ATORVASTATIN CALCIUM 20 MG: 10 TABLET, FILM COATED ORAL at 08:57

## 2024-02-15 RX ADMIN — FAMOTIDINE 20 MG: 20 TABLET, FILM COATED ORAL at 21:05

## 2024-02-15 RX ADMIN — SODIUM CHLORIDE 1000 MG: 900 INJECTION INTRAVENOUS at 04:17

## 2024-02-15 RX ADMIN — CLONIDINE HYDROCHLORIDE 0.1 MG: 0.1 TABLET ORAL at 21:05

## 2024-02-15 NOTE — PROGRESS NOTES
[facility]  Spine Surgery  Michael Wallace PA-C   Progress Note        Subjective/Overnight Events:  Patient doing quite well this morning, no pain complaints, eager to walk today, staged for surgery tomorrow.     Vitals  Vitals:    02/14/24 2008 02/14/24 2201 02/15/24 0243 02/15/24 0708   BP: 148/71 150/85 168/74    BP Location: Left arm Left arm Left arm    Patient Position: Lying Lying Lying    Pulse: 73 66 68    Resp: 18 18 16    Temp: 98.2 °F (36.8 °C) 98.4 °F (36.9 °C) 98.2 °F (36.8 °C)    TempSrc: Oral Oral Oral    SpO2: 94% 96% 98% 97%   Weight:       Height:           Current Facility-Administered Medications   Medication Dose Route Frequency Provider Last Rate Last Admin    acetaminophen (TYLENOL) tablet 650 mg  650 mg Oral Q6H PRN CHARITO Garcia MD        atorvastatin (LIPITOR) tablet 20 mg  20 mg Oral Daily CHARITO Garcia MD   20 mg at 02/14/24 1748    ceFAZolin 1000 mg IVPB in 100 mL NS (MBP)  1,000 mg Intravenous Q8H CHARITO Garcia MD   1,000 mg at 02/15/24 0417    [START ON 2/16/2024] ceFAZolin 2000 mg IVPB in 100 mL NS (MBP)  2,000 mg Intravenous On Call to OR Michael Wallace PA-C        cloNIDine (CATAPRES) tablet 0.1 mg  0.1 mg Oral BID CHARITO Garcia MD   0.1 mg at 02/14/24 2030    dextrose (D50W) (25 g/50 mL) IV injection 25 g  25 g Intravenous Q15 Min PRN Adolph Chao MD        dextrose (GLUTOSE) oral gel 15 g  15 g Oral Q15 Min PRN Adolph Chao MD        diphenhydrAMINE (BENADRYL) capsule 25 mg  25 mg Oral Nightly PRN CHARITO Garcia MD        famotidine (PEPCID) injection 20 mg  20 mg Intravenous Q12H CHARITO Garcia MD   20 mg at 02/14/24 2030    Or    famotidine (PEPCID) tablet 20 mg  20 mg Oral Q12H CHARITO Garcia MD        gabapentin (NEURONTIN) capsule 300 mg  300 mg Oral TID CHARITO Garcia MD   300 mg at 02/14/24 2030    glucagon (GLUCAGEN) injection 1 mg  1 mg Intramuscular Q15 Min  PRN Adolph Chao MD        hydroCHLOROthiazide tablet 12.5 mg  12.5 mg Oral Daily CHARITO Garcia MD   12.5 mg at 02/14/24 1749    HYDROcodone-acetaminophen (NORCO) 7.5-325 MG per tablet 1 tablet  1 tablet Oral Q6H PRN CHARITO Garcia MD   1 tablet at 02/14/24 1751    HYDROmorphone (DILAUDID) injection 1 mg  1 mg Intravenous Q3H PRN CHARITO Garcia MD        Insulin Lispro (humaLOG) injection 2-7 Units  2-7 Units Subcutaneous 4x Daily AC & at Bedtime Adolph Chao MD        lactated ringers infusion 1,000 mL  1,000 mL Intravenous Continuous CHARITO Garcia MD 25 mL/hr at 02/14/24 1224 Restarted at 02/14/24 1437    lactated ringers infusion 1,000 mL  1,000 mL Intravenous Continuous CHARITO Garcia MD 25 mL/hr at 02/14/24 1224 Restarted at 02/14/24 1443    lactated ringers infusion  100 mL/hr Intravenous Continuous PRN CHARITO Garcia MD        lactated ringers infusion  9 mL/hr Intravenous Continuous CHARITO Garcia MD        losartan (COZAAR) tablet 25 mg  25 mg Oral Daily CHARITO Garcia MD   25 mg at 02/14/24 1749    metFORMIN (GLUCOPHAGE) tablet 1,000 mg  1,000 mg Oral BID AC CHARITO Garcia MD   1,000 mg at 02/14/24 1749    ondansetron ODT (ZOFRAN-ODT) disintegrating tablet 4 mg  4 mg Oral Q6H PRN CHARITO Garcia MD        Or    ondansetron (ZOFRAN) injection 4 mg  4 mg Intravenous Q6H PRN CHARITO Garcia MD        ondansetron (ZOFRAN) injection 4 mg  4 mg Intravenous Q6H PRN CHARITO Garcia MD        polyethylene glycol (MIRALAX) packet 17 g  17 g Oral BID Adolph Chao MD        sodium chloride 0.9 % flush 10 mL  10 mL Intravenous PRN CHARITO Garcia MD        sodium chloride 0.9 % flush 3 mL  3 mL Intravenous Q12H CHARITO Garcia MD   3 mL at 02/14/24 2031    sodium chloride 0.9 % infusion 40 mL  40 mL Intravenous PRN CHARITO Garcia MD        sodium chloride 0.9 % infusion  75 mL/hr Intravenous Continuous CHARITO Garcia,  MD 75 mL/hr at 02/14/24 1737 75 mL/hr at 02/14/24 1737    sodium chloride 0.9 % infusion  75 mL/hr Intravenous Continuous CHARITO Garcia MD        tiZANidine (ZANAFLEX) tablet 4 mg  4 mg Oral Q8H PRN CHARITO Garcia MD           PHYSICAL EXAM:    Orientation:  alert and oriented to person, place, and time    Incision:  no significant drainage    Upper Extremity Motor :  5/5 bilaterally    Upper Motor Neuron Signs:  none     Lower Extremity Motor :  equal bilaterally    Lower Extremity Sensory:  Tibial nerve: Intact, Superficial peroneal nerve: Intact, and Deep peroneal nerve: Intact    Flatus:  flatus    ABNORMAL EXAM FINDINGS:  none    LABS:    Lab Results (last 24 hours)       Procedure Component Value Units Date/Time    Basic Metabolic Panel [[id number]]  (Abnormal) Collected: 02/15/24 0336    Specimen: Blood Updated: 02/15/24 0457     Glucose 163 mg/dL      BUN 16 mg/dL      Creatinine 1.02 mg/dL      Sodium 137 mmol/L      Potassium 4.4 mmol/L      Comment: Slight hemolysis detected by analyzer. Result may be falsely elevated.        Chloride 103 mmol/L      CO2 22.0 mmol/L      Calcium 9.0 mg/dL      BUN/Creatinine Ratio 15.7     Anion Gap 12.0 mmol/L      eGFR 83.1 mL/min/1.73     Narrative:      GFR Normal >60  Chronic Kidney Disease <60  Kidney Failure <15      CBC & Differential [[id number]]  (Abnormal) Collected: 02/15/24 0336    Specimen: Blood Updated: 02/15/24 0429    Narrative:      The following orders were created for panel order CBC & Differential.  Procedure                               Abnormality         Status                     ---------                               -----------         ------                     CBC Auto Differential[[id number]]        Abnormal            Final result                 Please view results for these tests on the individual orders.    CBC Auto Differential [[id number]]  (Abnormal) Collected: 02/15/24 0336    Specimen: Blood Updated: 02/15/24 0429     WBC  12.29 10*3/mm3      RBC 4.44 10*6/mm3      Hemoglobin 14.0 g/dL      Hematocrit 40.9 %      MCV 92.1 fL      MCH 31.5 pg      MCHC 34.2 g/dL      RDW 12.5 %      RDW-SD 42.2 fl      MPV 11.5 fL      Platelets 213 10*3/mm3      Neutrophil % 82.3 %      Lymphocyte % 10.0 %      Monocyte % 7.2 %      Eosinophil % 0.0 %      Basophil % 0.2 %      Immature Grans % 0.3 %      Neutrophils, Absolute 10.11 10*3/mm3      Lymphocytes, Absolute 1.23 10*3/mm3      Monocytes, Absolute 0.89 10*3/mm3      Eosinophils, Absolute 0.00 10*3/mm3      Basophils, Absolute 0.02 10*3/mm3      Immature Grans, Absolute 0.04 10*3/mm3      nRBC 0.0 /100 WBC     POC Glucose Once [[id number]]  (Normal) Collected: 02/14/24 1451    Specimen: Blood Updated: 02/14/24 1504     Glucose 119 mg/dL      Comment: : [id number] Andrew Soria ID: [id number]       POC Glucose Once [[id number]]  (Abnormal) Collected: 02/14/24 0931    Specimen: Blood Updated: 02/14/24 0943     Glucose 133 mg/dL      Comment: : [id number] Alex Rodriguez ID: [id number]               ASSESSMENT AND PLAN:    Post operative day 1 status post lateral lumbar fusion L2-4    1:  Activity Level:  up with assist  2:  Pain Control:  oral analgesia   3:  Discharge Planning:  pending completion of next stage  4:  Other:  npo after midnight, abx on call to OR      Electronically signed by Michael Wallace PA-C 2/15/2024 07:26 CST

## 2024-02-15 NOTE — CONSULTS
Consult Note    Referring Provider: Dr. Garcia  Reason for Consultation: Medical management    Patient Care Team:  Provider, No Known as PCP - General    Chief complaint chronic back pain    Subjective .     History of present illness:  The patient presents today for surgical correction after failing conservative management.  Outpatient work-up has been reviewed through provided outpatient notes per attending.  They have been through anti-inflammatories, muscle relaxers, and pain medication.  The pain has progressed to the point in time where it is affecting their activities of daily living and after being explained all their options, elected to undergo surgical correction.  Their primary care physician does not attend here at [facility]; therefore, I have been asked to take care of their primary medical needs in the perioperative period.  The postoperative pain is as expected.  There are no other precipitating or relieving factors. I have been requested by the Attending Physician to provide Medical Consultation in the perioperative period. The patient understands my role in their hospitalization and agrees with my treatment plan. They understand the importance of follow up with their PCP upon discharge  from [facility] for any concerns or abnormalities.  All questions were encouraged and answered to the best of my ability.     Pleasant 63-year-old gentleman who we saw approximately 1 year ago presents for two-step back surgery after failing conservative treatment of note multiple previous interventions.  Interestingly he is positive for alpha gal and will need dietary restrictions.      REVIEW OF SYSTEMS:    CONSTITUTIONAL:  Negative for anorexia, chills, fevers, night sweats and weight loss  EYES:  negative for eye dryness, icterus and redness  HEENT:   negative for dental problems, epistaxis, facial trauma and thrush  RESPIRATORY:  negative for chest tightness, cough, dyspnea on exertion,  pneumonia and sputum  CARDIOVASCULAR: negative for chest pain, dyspnea, exertional chest pressure/discomfort, irregular heart beat, palpitations, paroxysmal nocturnal dyspnea and syncope  GASTROINTESTINAL:  negative for abdominal pain, hematemesis, jaundice, melena and rectal bleeding. No significant changes in bowel habits preoperatively.   MUSCULOSKELETAL:  negative for muscle weakness, myalgias and neck pain, outside of surgical issues noted above  NEUROLOGICAL:   negative for dizziness, headaches, seizures, speech problems, tremors and vertigo  INTEGUMENT: negative for pruritus, rash, skin color change and skin lesion(s)         History    Past Medical History:   Diagnosis Date    Allergy to alpha-gal     Arthritis     Chronic bilateral low back pain without sciatica 12/19/2022    Diabetes mellitus     Hearing loss, left     History of staph infection     Hypertension     Low back pain     Lumbosacral disc disease 12/19/2022    Psoriasis     Sleep apnea     Tinnitus     Vision disturbance     using rx eyedrops     Past Surgical History:   Procedure Laterality Date    ANTERIOR LUMBAR EXPOSURE N/A 12/19/2022    Procedure: ANTERIOR LUMBAR EXPOSURE;  Surgeon: Simba Dyson DO;  Location:  [facility] OR;  Service: Vascular;  Laterality: N/A;    BACK SURGERY      x 2    CHOLECYSTECTOMY      COLONOSCOPY      GANGLION CYST EXCISION Left     wrist    LIPOMA EXCISION      back    LUMBAR FUSION Left 12/21/2022    Procedure: LEFT LATERAL LUMBAR INTERBODY FUSION WITH INSTRUMENTATION L4-5;  Surgeon: CAHRITO Garcia MD;  Location:  [facility] OR;  Service: Orthopedic Spine;  Laterality: Left;    LUMBAR FUSION N/A 12/19/2022    Procedure: ANTERIOR DECOMPRESSION, ANTERIOR LUMBAR INTERBODY FUSION WITH INSTRUMENTATION L5-S1;  Surgeon: CHARITO Garcia MD;  Location:  [facility] OR;  Service: Orthopedic Spine;  Laterality: N/A;    LUMBAR LAMINECTOMY WITH FUSION N/A 12/23/2022    Procedure: POSTERIOR SPINAL FUSION WITH INSTRUMENTATION  L4-S1;  Surgeon: CHARITO Garcia MD;  Location: [facility];  Service: Orthopedic Spine;  Laterality: N/A;     History reviewed. No pertinent family history.  Social History     Tobacco Use    Smoking status: Former     Types: Cigarettes     Quit date:      Years since quittin.1    Smokeless tobacco: Never   Vaping Use    Vaping Use: Never used   Substance Use Topics    Alcohol use: Yes     Comment: rarely    Drug use: Never     Medications Prior to Admission   Medication Sig Dispense Refill Last Dose    atorvastatin (LIPITOR) 20 MG tablet Take 1 tablet by mouth Daily.   2024    cloNIDine (CATAPRES) 0.1 MG tablet Take 1 tablet by mouth 2 (Two) Times a Day.   2024    etanercept (ENBREL) 50 MG/ML solution prefilled syringe injection Inject 1 mL under the skin into the appropriate area as directed 1 (One) Time Per Week.   Past Month    gabapentin (NEURONTIN) 300 MG capsule Take 1 capsule by mouth 3 (Three) Times a Day.   Past Week    glipizide (GLUCOTROL) 5 MG tablet Take 1 tablet by mouth Daily.   2024    hydroCHLOROthiazide (HYDRODIURIL) 12.5 MG tablet Take 1 tablet by mouth Daily.   2024    losartan (COZAAR) 25 MG tablet Take 1 tablet by mouth Daily.   2024    metFORMIN (GLUCOPHAGE) 1000 MG tablet Take 2 tablets by mouth Daily. Patient takes 1 tablet twice daily   2024    acetaminophen (TYLENOL) 325 MG tablet Take 2 tablets by mouth Every 6 (Six) Hours As Needed for Mild Pain.   Unknown    aspirin 81 MG EC tablet Take 1 tablet by mouth Daily.   2024    ondansetron (ZOFRAN) 4 MG tablet Take 1 tablet by mouth Every 8 (Eight) Hours As Needed for Nausea or Vomiting. 45 tablet 1 More than a month       Allergies:  Alpha-gal and Penicillins    Objective     Vital Signs   Temp:  [96.9 °F (36.1 °C)-98.4 °F (36.9 °C)] 98.2 °F (36.8 °C)  Heart Rate:  [58-76] 68  Resp:  [16-18] 16  BP: (148-226)/() 168/74          Physical Exam:  Constitutional: oriented to person, place, and  time. appears well-developed.   Head: Normocephalic and atraumatic.   Eyes: Pupils are equal, round, and reactive to light.  No icterus or erythema  Neck: Neck supple.  Without masses or carotid bruit  Cardiovascular: Regular rhythm and normal heart sounds.  No significant lift, rub or murmur noted  Pulmonary/Chest: Effort normal and breath sounds normal. CTAB, encourage deep breathing.  Abdominal: Soft. Bowel sounds are normal to hypoactive. No significant distension. There is no rebound and no guarding.   Musculoskeletal: Normal range of motion and no edema or tenderness outside of surgical area.   Neurological: Pt is alert and oriented to person, place, and time.  normal reflexes present.  Somewhat sedated from anesthesia and pain medicine noted  Skin: Skin is warm and dry.  No new rashes of concern.    Results Review:   I reviewed the patient's new imaging results and agree with the interpretation.      Assessment & Plan       Lumbar stenosis    Lumbosacral disc disease    Type 2 diabetes mellitus with hyperglycemia, without long-term current use of insulin    Essential hypertension    Drug-induced constipation      Type 2 DM- review labs and home medication. Discuss with patient importance of blood sugar control in the healing process. Review diet, medical,and lifestyle modifications for optimal medical treatment. Will restart their regular diabetic regimen except hold Glucotrol due to risk of hypoglycemia in the perioperative period and make consult for diabetic education / dietician available upon request.  Add Accu-Cheks with sliding scale insulin for now.    GERD-exacerbated by pain medications and anesthesia, will add PPI and or H2 blocker as needed and can step up to Carafate 1 gm po before each meal and nightly. Patient educated about smaller portions with more frequent feedings. Patient also instructed on early mobilization to help with return of normal peristalsis of gut.       Constipation-educate  patient about the ill effects of anesthesia and narcotic pain medication on the normal peristalsis of the gut.  Encourage judicious use of pain medication and early ambulation to aid in bowel functioning returning to normal.  We will start with MiraLAX 1 capful BID until BM, then decrease to 1 x a day, then can step up to a prokinetic which can be used for opioid-induced constipation, and ultimately end up with Relistor 12 mcg subq every 48 hours to block the effect of narcotics on the gut.  Our plan is to soften the stools so after surgical intervention if stimulation is needed with magnesium citrate and or Dulcolax suppository the stool will move much easier.  Encourage water consumption to help soften the stool as well.     Anemia post-op as expected.  Will check iron, B12,and folate.  If significant replenish substrates as needed. Transfuse at acceptable levels depending on clinical judgement and comorbidities.  We will check periodically throughout hospitalization and educate patient about following up with their PCP to ensure levels returned to normal.     Explained to patient and staff that we were consulted for medical management during their acute care hospitalization. The Medical Consultation was requested by the Attending Physician. The patient has been recommended to f/u with their regular primary care provider concerning any further treatment and review of abnormalities found during their hospitalization at [facility]. They agree with the treatment plan as well as understand our role in their hospitalization. All questions were encouraged and answered to the best of my ability.       I discussed the patient's findings and my recommendations with patient, nursing staff, and consulting provider    Adolph Chao MD  02/15/24  06:34 CST

## 2024-02-15 NOTE — PLAN OF CARE
Goal Outcome Evaluation:  Plan of Care Reviewed With: patient        Progress: no change  Outcome Evaluation: OT eval complete. Pt A&O x4. Pt lives at home w/ spouse and adult daughter. PLOF I in ADLs, cooking, driving, shopping, household/community mobility. Pt s/p R lateral lumbar interbody fusion on 02/14/24 & due for another surgery on 02/16/24. Pt SBA for static/dynamic sitting bal, rolling R, and supine to sit. Mod A doff/don socks. SBA to don underwear. Pt able to recite spinal precautions with cues. CGA for sit <> stand w/ vc to push off the bed. CGA for ambulation. Mod I for toileting w/ rwx, supported standing. Pt appears near baseline function except with LB dressing. Pt appears to have weakness in L hip flexor post op, but has shown improvement since yesterday. OT will continue to follow pt and re-eval after second stage to address LB dressing as indicated.      Anticipated Discharge Disposition (OT): home with assist

## 2024-02-15 NOTE — PLAN OF CARE
Goal Outcome Evaluation:  Plan of Care Reviewed With: patient, spouse, daughter           Outcome Evaluation: Pt A&O x4. Pt has been ambulating with his walker and stand by assist today. LSO brace in place. R hand IV occluded on afternoon assess and dc'd. BG moonitored. Pain within tolerable limits, pt has not complained. Pt has part two surgery scheduled for tomorrow. Pt resting comfortably in bed this afternoon without any complaints. Call light within reach.

## 2024-02-15 NOTE — THERAPY DISCHARGE NOTE
Patient Name: Manuel Moscoso  : 1960    MRN: [id number]                              Today's Date: 2/15/2024       Admit Date: 2024    Visit Dx:     ICD-10-CM ICD-9-CM   1. Impaired mobility [Z74.09]  Z74.09 799.89     Patient Active Problem List   Diagnosis    Lumbosacral disc disease    Chronic bilateral low back pain without sciatica    Lumbago of multiple sites in spine with sciatica    Type 2 diabetes mellitus with hyperglycemia, without long-term current use of insulin    Essential hypertension    Drug-induced constipation    Lumbar stenosis     Past Medical History:   Diagnosis Date    Allergy to alpha-gal     Arthritis     Chronic bilateral low back pain without sciatica 2022    Diabetes mellitus     Hearing loss, left     History of staph infection     Hypertension     Low back pain     Lumbosacral disc disease 2022    Psoriasis     Sleep apnea     Tinnitus     Vision disturbance     using rx eyedrops     Past Surgical History:   Procedure Laterality Date    ANTERIOR LUMBAR EXPOSURE N/A 2022    Procedure: ANTERIOR LUMBAR EXPOSURE;  Surgeon: Simba Dyson DO;  Location:  [facility];  Service: Vascular;  Laterality: N/A;    BACK SURGERY      x 2    CHOLECYSTECTOMY      COLONOSCOPY      GANGLION CYST EXCISION Left     wrist    LIPOMA EXCISION      back    LUMBAR FUSION Left 2022    Procedure: LEFT LATERAL LUMBAR INTERBODY FUSION WITH INSTRUMENTATION L4-5;  Surgeon: CHARITO Garcia MD;  Location:  [facility];  Service: Orthopedic Spine;  Laterality: Left;    LUMBAR FUSION N/A 2022    Procedure: ANTERIOR DECOMPRESSION, ANTERIOR LUMBAR INTERBODY FUSION WITH INSTRUMENTATION L5-S1;  Surgeon: CHARITO Garcia MD;  Location:  [facility];  Service: Orthopedic Spine;  Laterality: N/A;    LUMBAR LAMINECTOMY WITH FUSION N/A 2022    Procedure: POSTERIOR SPINAL FUSION WITH INSTRUMENTATION L4-S1;  Surgeon: CHARITO Garcia MD;  Location:  [facility];  Service:  Orthopedic Spine;  Laterality: N/A;      General Information       Row Name 02/15/24 48          Physical Therapy Time and Intention    Document Type discharge evaluation/summary  Lumbar stenosis, R lateral L2-L4 fusion, LBP, peripheral neuropathy  -MS (r) AJ (t) MS (c)     Mode of Treatment co-treatment;physical therapy  -MS (r) AJ (t) MS (c)       Row Name 02/15/24 0748          General Information    Patient Profile Reviewed yes  -MS (r) AJ (t) MS (c)     Prior Level of Function independent:;dressing;bathing;ADL's;cooking;driving;all household mobility;community mobility  -MS (r) AJ (t) MS (c)     Existing Precautions/Restrictions spinal;fall;LSO;brace worn when out of bed  -MS (r) AJ (t) MS (c)     Barriers to Rehab physical barrier  -MS (r) AJ (t) MS (c)       Row Name 02/15/24 [facility]          Living Environment    People in Home child(nayeli), adult;spouse  -MS (r) AJ (t) MS (c)       Row Name 02/15/24 [facility]          Home Main Entrance    Number of Stairs, Main Entrance two  -MS (r) AJ (t) MS (c)     Stair Railings, Main Entrance none  -MS (r) AJ (t) MS (c)       Row Name 02/15/24 48          Stairs Within Home, Primary    Number of Stairs, Within Home, Primary none  -MS (r) AJ (t) MS (c)       Row Name 02/15/24 0748          Cognition    Orientation Status (Cognition) oriented x 4  -MS (r) AJ (t) MS (c)       Row Name 02/15/24 07          Safety Issues, Functional Mobility    Impairments Affecting Function (Mobility) pain;strength;sensation/sensory awareness;range of motion (ROM)  -MS (r) AJ (t) MS (c)               User Key  (r) = Recorded By, (t) = Taken By, (c) = Cosigned By      Initials Name Provider Type    Chantelle Lawrence, PT, DPT, NCS Physical Therapist    Cade Ortiz, PT Student PT Student                   Mobility       Row Name 02/15/24 0748          Bed Mobility    Bed Mobility rolling left;sidelying-sit  -MS (r) AJ (t) MS (c)     Rolling Left [place] (Bed Mobility) standby assist   -MS (r) AJ (t) MS (c)     Sidelying-Sit [place] (Bed Mobility) standby assist  -MS (r) AJ (t) MS (c)     Assistive Device (Bed Mobility) bed rails  -MS (r) AJ (t) MS (c)       [facility] Name 02/15/24 0748          Bed-Chair Transfer    Bed-Chair [place] (Transfers) contact guard  -MS (r) AJ (t) MS (c)     Assistive Device (Bed-Chair Transfers) walker, front-wheeled  -MS (r) AJ (t) MS (c)       [facility] Name 02/15/24 0748          Sit-Stand Transfer    Sit-Stand [place] (Transfers) contact guard;verbal cues  -MS (r) AJ (t) MS (c)     Assistive Device (Sit-Stand Transfers) walker, front-wheeled  -MS (r) AJ (t) MS (c)       Row Name 02/15/24 0748          Gait/Stairs (Locomotion)    [place] Level (Gait) contact guard  -MS (r) AJ (t) MS (c)     Assistive Device (Gait) walker, front-wheeled  -MS (r) AJ (t) MS (c)     Distance in Feet (Gait) 1000ft  -MS (r) AJ (t) MS (c)               User Key  (r) = Recorded By, (t) = Taken By, (c) = Cosigned By      Initials Name Provider Type    Chantelle Lawrence R, PT, DPT, NCS Physical Therapist    Cade Ortiz, CORNELL Student PT Student                   Obj/Interventions       Row Name 02/15/24 0748          Range of Motion Comprehensive    General Range of Motion bilateral upper extremity ROM WFL;lower extremity range of motion deficits identified  -MS (r) AJ (t) MS (c)     Comment, General Range of Motion B hip flex 25% limited in sitting, WFL in standing  -MS (r) AJ (t) MS (c)       Row Name 02/15/24 0748          Strength Comprehensive (MMT)    General Manual Muscle Testing (MMT) Assessment lower extremity strength deficits identified  -MS (r) AJ (t) MS (c)     Comment, General Manual Muscle Testing (MMT) Assessment B hip flex 2+/5, R knee ext 4-/5  -MS (r) AJ (t) MS (c)       Row Name 02/15/24 0748          Balance    Balance Assessment sitting static balance;sitting dynamic balance;sit to stand dynamic balance;standing static balance;standing dynamic balance  -MS (r)  AJ (t) MS (c)     Static Sitting Balance independent  -MS (r) AJ (t) MS (c)     Dynamic Sitting Balance independent  -MS (r) AJ (t) MS (c)     Position, Sitting Balance unsupported;sitting edge of bed  -MS (r) AJ (t) MS (c)     Sit to Stand Dynamic Balance contact guard;verbal cues  -MS (r) AJ (t) MS (c)     Static Standing Balance contact guard  -MS (r) AJ (t) MS (c)     Dynamic Standing Balance contact guard  -MS (r) AJ (t) MS (c)     Position/Device Used, Standing Balance walker, front-wheeled  -MS (r) AJ (t) MS (c)     Balance Interventions sitting;standing;sit to stand;dynamic  -MS (r) AJ (t) MS (c)       Row Name 02/15/24 0748          Sensory Assessment (Somatosensory)    Sensory Assessment (Somatosensory) right LE (P)   L L3 diminshed light touch, L peripheral neuropathy in big toe  -AJ     Right LE Sensory Assessment light touch awareness  -MS (r) AJ (t) MS (c)               User Key  (r) = Recorded By, (t) = Taken By, (c) = Cosigned By      Initials Name Provider Type    Chantelle Lawrence R, PT, DPT, NCS Physical Therapist    Cade Ortiz, PT Student PT Student                   Goals/Plan    No documentation.                  Clinical Impression       Row Name 02/15/24 0748          Pain    Pretreatment Pain Rating 4/10  -MS (r) AJ (t) MS (c)     Posttreatment Pain Rating 3/10  -MS (r) AJ (t) MS (c)     Pain Location lower  -MS (r) AJ (t) MS (c)     Pain Location - back  -MS (r) AJ (t) MS (c)     Pain Intervention(s) Medication (See MAR);Ambulation/increased activity;Repositioned  -MS (r) AJ (t) MS (c)       Row Name 02/15/24 8905          Plan of Care Review    Plan of Care Reviewed With patient  -MS (r) AJ (t) MS (c)     Progress improving  -MS (r) AJ (t) MS (c)     Outcome Evaluation Patient presented alert and Ox4 laying supine with HOB elevated and OT in room. Pt c/o of 4/10 pain that is in lower back but down not radiate down LE. Pt was educated on spinal precautions and brace w/ good  understanding. Pt performed log roll and sidelying-sit with SBA. Pt has history of peripheral neuropathy and numbness in LLE below the knee. Pt has diminshed light touch at L3 and absent light touch at L4 on L side. B hip had limited AROM in sitting but had full AROM in standing. Pt ambulated with walker and CGA about 1000ft. At this time PT will sign off and re-eval pt after surgery tomorrow.  -MS (r) AJ (t) MS (c)       Row Name 02/15/24 0748          Therapy Assessment/Plan (PT)    Patient/Family Therapy Goals Statement (PT) Get stronger  -MS (r) AJ (t) MS (c)     Criteria for Skilled Interventions Met (PT) no problems identified which require skilled intervention  -MS (r) AJ (t) MS (c)     Therapy Frequency (PT) evaluation only  -MS (r) AJ (t) MS (c)       Row Name 02/15/24 0748          Vital Signs    Pre SpO2 (%) 100  -MS (r) AJ (t) MS (c)     O2 Delivery Pre Treatment room air  -MS (r) AJ (t) MS (c)     Post SpO2 (%) 99  -MS (r) AJ (t) MS (c)     O2 Delivery Post Treatment room air  -MS (r) AJ (t) MS (c)     Pre Patient Position Supine  -MS (r) AJ (t) MS (c)     Intra Patient Position Standing  -MS (r) AJ (t) MS (c)     Post Patient Position Sitting  -MS (r) AJ (t) MS (c)       Row Name 02/15/24 0748          Positioning and Restraints    Pre-Treatment Position in bed  -MS (r) AJ (t) MS (c)     Post Treatment Position chair  -MS (r) AJ (t) MS (c)     In Chair sitting;call light within reach;encouraged to call for assist;with brace  -MS (r) AJ (t) MS (c)               User Key  (r) = Recorded By, (t) = Taken By, (c) = Cosigned By      Initials Name Provider Type    Chantelle Lawrence, PT, DPT, NCS Physical Therapist    Cade Ortiz, PT Student PT Student                   Outcome Measures       Row Name 02/15/24 0800 02/15/24 0748       How much help from another person do you currently need...    Turning from your back to your side while in flat bed without using bedrails? 4  -SP 4  -MS (r) AJ (t) MS (c)     Moving from lying on back to sitting on the side of a flat bed without bedrails? 4  -SP 4  -MS (r) AJ (t) MS (c)    Moving to and from a bed to a chair (including a wheelchair)? 3  -SP 3  -MS (r) AJ (t) MS (c)    Standing up from a chair using your arms (e.g., wheelchair, bedside chair)? 3  -SP 3  -MS (r) AJ (t) MS (c)    Climbing 3-5 steps with a railing? 3  -SP 3  -MS (r) AJ (t) MS (c)    To walk in hospital room? 4  -SP 4  -MS (r) AJ (t) MS (c)    AM-PAC 6 Clicks Score (PT) 21  -SP 21  -MS (r) AJ (t)    Highest Level of Mobility Goal 6 --> Walk 10 steps or more  -SP 6 --> Walk 10 steps or more  -MS (r) AJ (t)      Row Name 02/15/24 0821          Functional Assessment    Outcome Measure Options AM-PAC 6 Clicks Daily Activity (OT)  -AC (r) HW (t) AC (c)               User Key  (r) = Recorded By, (t) = Taken By, (c) = Cosigned By      Initials Name Provider Type    Abilio Xiong, OTR/L, CNT Occupational Therapist    Chantelle Lawrence, PT, DPT, NCS Physical Therapist    Manjit Thomas, RN Registered Nurse    Cade Ortiz, PT Student PT Student    Ml Whitfield, OT Student OT Student                  Physical Therapy Education       Title: PT OT SLP Therapies (In Progress)       Topic: Physical Therapy (In Progress)       Point: Mobility training (Done)       Learning Progress Summary             Patient Acceptance, E, VU by RELL at 2/15/2024 0845    Comment: PT role in care, spinal precautions, LSO brace                         Point: Home exercise program (Not Started)       Learner Progress:  Not documented in this visit.              Point: Body mechanics (Done)       Learning Progress Summary             Patient Acceptance, E, VU by RELL at 2/15/2024 0845    Comment: PT role in care, spinal precautions, LSO brace                         Point: Precautions (Done)       Learning Progress Summary             Patient Acceptance, E, VU by RELL at 2/15/2024 0845    Comment: PT role in care, spinal  precautions, LSO brace                                         User Key       Initials Effective Dates Name Provider Type Discipline     12/14/23 -  Cade White, PT Student PT Student PT                  PT Recommendation and Plan     Plan of Care Reviewed With: patient  Progress: improving  Outcome Evaluation: Patient presented alert and Ox4 laying supine with HOB elevated and OT in room. Pt c/o of 4/10 pain that is in lower back but down not radiate down LE. Pt was educated on spinal precautions and brace w/ good understanding. Pt performed log roll and sidelying-sit with SBA. Pt has history of peripheral neuropathy and numbness in LLE below the knee. Pt has diminshed light touch at L3 and absent light touch at L4 on L side. B hip had limited AROM in sitting but had full AROM in standing. Pt ambulated with walker and CGA about 1000ft. At this time PT will sign off and re-eval pt after surgery tomorrow.     Time Calculation:         PT Charges       Row Name 02/15/24 0748             Time Calculation    Start Time 0748  10min chart review  -MS (r) AJ (t) MS (c)      Stop Time 0820  -MS (r) AJ (t) MS (c)      Time Calculation (min) 32 min  -MS (r) AJ (t)      PT Received On 02/15/24  -MS (r) AJ (t) MS (c)         Untimed Charges    PT Eval/Re-eval Minutes 42  -MS (r) AJ (t) MS (c)         Total Minutes    Untimed Charges Total Minutes 42  -MS (r) AJ (t)       Total Minutes 42  -MS (r) AJ (t)                User Key  (r) = Recorded By, (t) = Taken By, (c) = Cosigned By      Initials Name Provider Type    MS Demetris Chantelle R, PT, DPT, NCS Physical Therapist    Cade Ortiz, PT Student PT Student                      PT G-Codes  Outcome Measure Options: AM-PAC 6 Clicks Daily Activity (OT)  AM-PAC 6 Clicks Score (PT): 21  AM-PAC 6 Clicks Score (OT): 21    PT Discharge Summary  Anticipated Discharge Disposition (PT): home with assist    Cade White PT Student  2/15/2024

## 2024-02-15 NOTE — PLAN OF CARE
Goal Outcome Evaluation:  Plan of Care Reviewed With: patient        Progress: improving  Outcome Evaluation: Patient presented alert and Ox4 laying supine with HOB elevated and OT in room. Pt c/o of 4/10 pain that is in lower back but down not radiate down LE. Pt was educated on spinal precautions and brace w/ good understanding. Pt performed log roll and sidelying-sit with SBA. Pt has history of peripheral neuropathy and numbness in LLE below the knee. Pt has diminshed light touch at L3 and absent light touch at L4 on L side. B hip had limited AROM in sitting but had full AROM in standing. Pt ambulated with walker and CGA about 1000ft. At this time PT will sign off and re-eval pt after surgery tomorrow.      Anticipated Discharge Disposition (PT): home with assist

## 2024-02-15 NOTE — PLAN OF CARE
Goal Outcome Evaluation:  Plan of Care Reviewed With: patient        Progress: no change  Outcome Evaluation: A & O, VSS, denies back pain, pain meds available prn, mepilex to R flank dry and intact, bedrest this shift since LSO brace has not arrived, voiding per urinal, IV ABX given, IVF continue, safety maintained

## 2024-02-15 NOTE — PLAN OF CARE
Goal Outcome Evaluation:   Pt arrived from OR, alert and oriented, c/o moderate pain, awaiting brace, pt needs to void on own, pt has alpha gal, room air, sitting up in bed eating        Progress: no change

## 2024-02-15 NOTE — PAYOR COMM NOTE
"ADMIT INPT 2-14-24  [id number]    [phone]    Maegan Foster (63 y.o. Male)       Date of Birth   1960    Social Security Number       Address   [facility] [address] [place] [address]    Home Phone   [phone]    MRN   [id number]       Denominational   Other    Marital Status                               Admission Date   2/14/24    Admission Type   Elective    Admitting Provider   CHARITO Garcia MD    Attending Provider   CHARITO Garcia MD    Department, Room/Bed   [facility] 3A, 354/1       Discharge Date       Discharge Disposition       Discharge Destination                                 Attending Provider: CHARITO Garcia MD    Allergies: Alpha-gal, Penicillins    Isolation: None   Infection: None   Code Status: CPR    Ht: 174.8 cm (68.82\")   Wt: 105 kg (230 lb 13.2 oz)    Admission Cmt: None   Principal Problem: Lumbar stenosis [M48.061]                   Active Insurance as of 2/14/2024       Primary Coverage       Payor Plan Insurance Group Employer/Plan Group    [employer]HE[employer] MEDICAID ANTH MEDICAID [id number]       Payor Plan Address Payor Plan Phone Number Payor Plan Fax Number Effective Dates    [address] [address] [phone]  1/1/2024 - None Entered    [facility] [id number]         Subscriber Name Subscriber Birth Date Member ID       MAEGAN FOSTER 1960 [id number]                     Emergency Contacts        (Rel.) Home Phone Work Phone Mobile Phone    RENÉHECTOR DRAPER (Friend) -- -- [phone]                 History & Physical        CHARITO Garcia MD at 02/14/24 1053          H&P reviewed. The patient was examined and there are no changes to the H&P.    Electronically signed by CHARITO Garcia MD at 02/14/24 1053   Source Note        [Media Unavailable] Scan on 2/7/2024 1652 by [employer], [employer]: HISTORY/PHYSICAL, [facility], 02/07/2024          Electronically signed by [employer], [employer] at 02/07/24 " 1755                 H&P signed by [employer], [employer] at 02/07/24 1755         [Media Unavailable] Scan on 2/7/2024 1652 by [employer], [employer]: HISTORY/PHYSICAL, [facility], 02/07/2024          Electronically signed by [employer], [employer] at 02/07/24 1755       Facility-Administered Medications as of 2/15/2024   Medication Dose Route Frequency Provider Last Rate Last Admin    acetaminophen (TYLENOL) tablet 650 mg  650 mg Oral Q6H PRN CHARITO Garcia MD        atorvastatin (LIPITOR) tablet 20 mg  20 mg Oral Daily CHARITO Garcia MD   20 mg at 02/14/24 1748    ceFAZolin 1000 mg IVPB in 100 mL NS (MBP)  1,000 mg Intravenous Q8H CHARITO Garcia MD   1,000 mg at 02/15/24 0417    [COMPLETED] ceFAZolin 2000 mg IVPB in 100 mL NS (MBP)  2,000 mg Intravenous Once CHARITO Garcia MD   2,000 mg at 02/14/24 1234    cloNIDine (CATAPRES) tablet 0.1 mg  0.1 mg Oral BID CHARITO Garcia MD   0.1 mg at 02/14/24 2030    dextrose (D50W) (25 g/50 mL) IV injection 25 g  25 g Intravenous Q15 Min PRN Adolph Chao MD        dextrose (GLUTOSE) oral gel 15 g  15 g Oral Q15 Min PRN Adolph Chao MD        diphenhydrAMINE (BENADRYL) capsule 25 mg  25 mg Oral Nightly PRN CHARITO Garcia MD        [COMPLETED] droperidol (INAPSINE) injection 0.625 mg  0.625 mg Intravenous Once PRN Isacc Lanier MD   0.625 mg at 02/14/24 1509    Or    [COMPLETED] droperidol (INAPSINE) injection 0.625 mg  0.625 mg Intramuscular Once PRN Isacc Lanier MD        famotidine (PEPCID) injection 20 mg  20 mg Intravenous Q12H CHARITO Garcia MD   20 mg at 02/14/24 2030    Or    famotidine (PEPCID) tablet 20 mg  20 mg Oral Q12H CHARITO Garcia MD        [COMPLETED] fentaNYL citrate (PF) (SUBLIMAZE) injection 25 mcg  25 mcg Intravenous Q5 Min PRN Isacc Lanier MD   25 mcg at 02/14/24 1525    gabapentin (NEURONTIN) capsule 300 mg  300 mg Oral TID CHARITO Garcia MD   300 mg  at 02/14/24 2030    glucagon (GLUCAGEN) injection 1 mg  1 mg Intramuscular Q15 Min PRN Adolph Chao MD        hydroCHLOROthiazide tablet 12.5 mg  12.5 mg Oral Daily CHARITO Garcia MD   12.5 mg at 02/14/24 1749    HYDROcodone-acetaminophen (NORCO) 7.5-325 MG per tablet 1 tablet  1 tablet Oral Q6H PRN CHARITO Garcia MD   1 tablet at 02/14/24 1751    HYDROmorphone (DILAUDID) injection 1 mg  1 mg Intravenous Q3H PRN CHARITO Garcia MD        Insulin Lispro (humaLOG) injection 2-7 Units  2-7 Units Subcutaneous 4x Daily AC & at Bedtime Adolph Chao MD        lactated ringers infusion 1,000 mL  1,000 mL Intravenous Continuous CHARITO Garcia MD 25 mL/hr at 02/14/24 1224 Restarted at 02/14/24 1437    lactated ringers infusion 1,000 mL  1,000 mL Intravenous Continuous CHARITO Garcia MD 25 mL/hr at 02/14/24 1224 Restarted at 02/14/24 1443    lactated ringers infusion  100 mL/hr Intravenous Continuous PRN CHARITO Garcia MD        lactated ringers infusion  9 mL/hr Intravenous Continuous CHARITO Garcia MD        losartan (COZAAR) tablet 25 mg  25 mg Oral Daily CHARITO Garcia MD   25 mg at 02/14/24 1749    metFORMIN (GLUCOPHAGE) tablet 1,000 mg  1,000 mg Oral BID AC CHARITO Garcia MD   1,000 mg at 02/14/24 1749    ondansetron ODT (ZOFRAN-ODT) disintegrating tablet 4 mg  4 mg Oral Q6H PRN CHARITO Garcia MD        Or    ondansetron (ZOFRAN) injection 4 mg  4 mg Intravenous Q6H PRN CHARITO Garcia MD        ondansetron (ZOFRAN) injection 4 mg  4 mg Intravenous Q6H PRN CHARITO Garcia MD        [COMPLETED] oxyCODONE ER (oxyCONTIN) 12 hr tablet 20 mg  20 mg Oral Once CHARITO Garcia MD   20 mg at 02/14/24 0910    [COMPLETED] oxyCODONE-acetaminophen (PERCOCET)  MG per tablet 1 tablet  1 tablet Oral Once PRN Isacc Lanier MD   1 tablet at 02/14/24 5899    polyethylene glycol (MIRALAX) packet 17 g  17 g Oral BID Adolph Chao MD         sodium chloride 0.9 % flush 10 mL  10 mL Intravenous PRN CHARITO Garcia MD        sodium chloride 0.9 % flush 3 mL  3 mL Intravenous Q12H CHARITO Garcia MD   3 mL at 02/14/24 2031    sodium chloride 0.9 % infusion 40 mL  40 mL Intravenous PRN CHARITO Garcia MD        sodium chloride 0.9 % infusion  75 mL/hr Intravenous Continuous CHARITO Garcia MD 75 mL/hr at 02/14/24 1737 75 mL/hr at 02/14/24 1737    sodium chloride 0.9 % infusion  75 mL/hr Intravenous Continuous CHARITO Garcia MD        tiZANidine (ZANAFLEX) tablet 4 mg  4 mg Oral Q8H PRN CHARITO Garcia MD         Orders (last 48 hrs)        Start     Ordered    02/16/24 0600  Iron Profile  Morning Draw         02/15/24 0638    02/16/24 0600  Vitamin B12  Morning Draw         02/15/24 0638    02/16/24 0600  Folate  Morning Draw         02/15/24 0638    02/15/24 0900  polyethylene glycol (MIRALAX) packet 17 g  2 Times Daily         02/15/24 0638    02/15/24 0730  Insulin Lispro (humaLOG) injection 2-7 Units  4 Times Daily Before Meals & Nightly         02/15/24 0637    02/15/24 0700  POC Glucose 4x Daily Before Meals & at Bedtime  4 Times Daily Before Meals & at Bedtime      Comments: Complete no more than 45 minutes prior to patient eating      02/15/24 0637    02/15/24 0637  dextrose (D50W) (25 g/50 mL) IV injection 25 g  Every 15 Minutes PRN         02/15/24 0637    02/15/24 0637  glucagon (GLUCAGEN) injection 1 mg  Every 15 Minutes PRN         02/15/24 0637    02/15/24 0637  dextrose (GLUTOSE) oral gel 15 g  Every 15 Minutes PRN         02/15/24 0637    02/15/24 0600  CBC & Differential  Morning Draw         02/14/24 1723    02/15/24 0600  Basic Metabolic Panel  Morning Draw         02/14/24 1723    02/15/24 0600  CBC Auto Differential  PROCEDURE ONCE         02/14/24 2201 02/14/24 2100  cloNIDine (CATAPRES) tablet 0.1 mg  2 Times Daily         02/14/24 1611 02/14/24 2100  gabapentin (NEURONTIN) capsule 300 mg  3 Times  Daily         02/14/24 1724    02/14/24 2100  sodium chloride 0.9 % flush 3 mL  Every 12 Hours Scheduled         02/14/24 1723    02/14/24 2100  famotidine (PEPCID) injection 20 mg  Every 12 Hours Scheduled        See Hyperspace for full Linked Orders Report.    02/14/24 1723    02/14/24 2100  famotidine (PEPCID) tablet 20 mg  Every 12 Hours Scheduled        See Hyperspace for full Linked Orders Report.    02/14/24 1723    02/14/24 2000  Neurovascular Checks  Every 4 Hours       02/14/24 1723    02/14/24 2000  ceFAZolin 1000 mg IVPB in 100 mL NS (MBP)  Every 8 Hours         02/14/24 1729 02/14/24 1815  atorvastatin (LIPITOR) tablet 20 mg  Daily         02/14/24 1724    02/14/24 1815  glipizide (GLUCOTROL) tablet 5 mg  Daily,   Status:  Discontinued         02/14/24 1724    02/14/24 1815  hydroCHLOROthiazide tablet 12.5 mg  Daily         02/14/24 1724    02/14/24 1815  losartan (COZAAR) tablet 25 mg  Daily         02/14/24 1724    02/14/24 1815  sodium chloride 0.9 % infusion  Continuous         02/14/24 1723    02/14/24 1815  ceFAZolin 1000 mg IVPB in 100 mL NS (MBP)  Every 8 Hours,   Status:  Discontinued         02/14/24 1724    02/14/24 1815  sodium chloride 0.9 % infusion  Continuous         02/14/24 1724    02/14/24 1800  Incentive Spirometry  Every 2 Hours While Awake       02/14/24 1723    02/14/24 1745  metFORMIN (GLUCOPHAGE) tablet 1,000 mg  2 Times Daily Before Meals         02/14/24 1724    02/14/24 1725  Inpatient Family Practice Consult  Once        Comments: Jeremie   Specialty:  Family Medicine  Provider:  Adolph Chao MD    02/14/24 1724    02/14/24 1725  Bracing Equipment Communication Sitting at Edge of Bed; Spinal; Lumbo-Sacral (LSO); Not Applicable; When Out of Bed  Once         02/14/24 1724    02/14/24 1724  acetaminophen (TYLENOL) tablet 650 mg  Every 6 Hours PRN         02/14/24 1724    02/14/24 1724  HYDROmorphone (DILAUDID) injection 1 mg  Every 3 Hours PRN         02/14/24  1724    02/14/24 1724  tiZANidine (ZANAFLEX) tablet 4 mg  Every 8 Hours PRN         02/14/24 1724    02/14/24 1724  HYDROcodone-acetaminophen (NORCO) 7.5-325 MG per tablet 1 tablet  Every 6 Hours PRN         02/14/24 1724    02/14/24 1724  Oxygen Therapy- Blow by - Humidified  Continuous         02/14/24 1724    02/14/24 1724  Pulse Oximetry, Continuous  Continuous         02/14/24 1724    02/14/24 1724  Code Status and Medical Interventions:  Continuous         02/14/24 1723    02/14/24 1724  Vital Signs  Per Hospital Policy         02/14/24 1723    02/14/24 1724  Empty drain  Every Shift       02/14/24 1723    02/14/24 1724  Ambulate Patient  Every Shift       02/14/24 1723    02/14/24 1724  Diet: Regular/House Diet; Texture: Regular Texture (IDDSI 7); Fluid Consistency: Thin (IDDSI 0)  Diet Effective Now         02/14/24 1723    02/14/24 1724  Advance Diet As Tolerated -  Until Discontinued         02/14/24 1723    02/14/24 1724  Oxygen Therapy- Nasal Cannula; Titrate 1-6 LPM Per SpO2; 90 - 95%  Continuous,   Status:  Canceled         02/14/24 1723 02/14/24 1724  Continuous Pulse Oximetry  Continuous,   Status:  Canceled         02/14/24 1723    02/14/24 1724  PT Consult: Eval & Treat  Once         02/14/24 1723    02/14/24 1724  OT Consult: Eval & Treat  Once         02/14/24 1723    02/14/24 1724  Insert Peripheral IV  Once         02/14/24 1723    02/14/24 1724  Saline Lock & Maintain IV Access  Continuous         02/14/24 1723    02/14/24 1724  Place Sequential Compression Device  Once         02/14/24 1723    02/14/24 1724  Maintain Sequential Compression Device  Continuous         02/14/24 1723    02/14/24 1723  sodium chloride 0.9 % flush 10 mL  As Needed         02/14/24 1723    02/14/24 1723  sodium chloride 0.9 % infusion 40 mL  As Needed         02/14/24 1723    02/14/24 1723  diphenhydrAMINE (BENADRYL) capsule 25 mg  Nightly PRN         02/14/24 1723    02/14/24 1723  ondansetron ODT (ZOFRAN-ODT)  disintegrating tablet 4 mg  Every 6 Hours PRN        See Hyperspace for full Linked Orders Report.    02/14/24 1723    02/14/24 1723  ondansetron (ZOFRAN) injection 4 mg  Every 6 Hours PRN        See Hyperspace for full Linked Orders Report.    02/14/24 1723    02/14/24 1723  ondansetron (ZOFRAN) injection 4 mg  Every 6 Hours PRN         02/14/24 1723    02/14/24 1705  labetalol (NORMODYNE,TRANDATE) injection 5 mg  Every 5 Minutes PRN,   Status:  Discontinued         02/14/24 1805    02/14/24 1505  POC Glucose Once  PROCEDURE ONCE        Comments: Complete no more than 45 minutes prior to patient eating      02/14/24 1451    02/14/24 1444  POC Glucose STAT  STAT,   Status:  Canceled        Comments: Post op Glucose Check on All Diabetic Patients, Notify Anesthesia if Blood Sugar is Less Than 80 mg/dL or Greater Than 250mg/dL      02/14/24 1443    02/14/24 1444  Vital signs every 5 minutes for 15 minutes, every 15 minutes thereafter.  Once,   Status:  Canceled         02/14/24 1443    02/14/24 1444  Call Anesthesiologist for additional IV Fluid bolus for Hypotension/Tachycardia  Continuous,   Status:  Canceled         02/14/24 1443    02/14/24 1444  Notify Anesthesia of Any Acute Changes in Patient Condition  Until Discontinued,   Status:  Canceled         02/14/24 1443    02/14/24 1444  Notify Anesthesia for Unrelieved Pain  Until Discontinued,   Status:  Canceled         02/14/24 1443    02/14/24 1444  Once DC criteria to floor met, follow surgeon's orders.  Until Discontinued,   Status:  Canceled         02/14/24 1443    02/14/24 1444  Discharge patient from PACU when discharge criteria is met.  Until Discontinued,   Status:  Canceled         02/14/24 1443    02/14/24 1444  Inpatient Admission  Once         02/14/24 1443    02/14/24 1443  oxyCODONE-acetaminophen (PERCOCET)  MG per tablet 1 tablet  Once As Needed         02/14/24 1443    02/14/24 1443  HYDROmorphone (DILAUDID) injection 0.5 mg  Every 10  Minutes PRN,   Status:  Discontinued         02/14/24 1443    02/14/24 1443  fentaNYL citrate (PF) (SUBLIMAZE) injection 25 mcg  Every 5 Minutes PRN         02/14/24 1443    02/14/24 1443  naloxone (NARCAN) injection 0.04 mg  As Needed,   Status:  Discontinued         02/14/24 1443    02/14/24 1443  flumazenil (ROMAZICON) injection 0.2 mg  As Needed,   Status:  Discontinued         02/14/24 1443    02/14/24 1443  ondansetron (ZOFRAN) injection 4 mg  Every 15 Minutes PRN,   Status:  Discontinued         02/14/24 1443    02/14/24 1443  droperidol (INAPSINE) injection 0.625 mg  Once As Needed        See Hyperspace for full Linked Orders Report.    02/14/24 1443    02/14/24 1443  droperidol (INAPSINE) injection 0.625 mg  Once As Needed        See Hyperspace for full Linked Orders Report.    02/14/24 1443    02/14/24 1443  labetalol (NORMODYNE,TRANDATE) injection 5 mg  Every 5 Minutes PRN,   Status:  Discontinued         02/14/24 1443    02/14/24 1443  atropine sulfate injection 0.5 mg  Once As Needed,   Status:  Discontinued         02/14/24 1443    02/14/24 1443  Apply warming blanket  As Needed,   Status:  Canceled      Comments: For a recorded temp of <36.9 C    02/14/24 1443    02/14/24 1443  ibuprofen (ADVIL,MOTRIN) tablet 600 mg  Once As Needed,   Status:  Discontinued         02/14/24 1443    02/14/24 1316  sodium chloride (NS) irrigation solution  As Needed,   Status:  Discontinued         02/14/24 1316    02/14/24 1316  thrombin topical  As Needed,   Status:  Discontinued         02/14/24 1316    02/14/24 1136  sodium chloride 0.9 % flush 10 mL  Every 12 Hours Scheduled,   Status:  Discontinued         02/14/24 1134    02/14/24 1136  lactated ringers infusion  Continuous         02/14/24 1134    02/14/24 1135  Oxygen Therapy- Nasal Cannula; Titrate 1-6 LPM Per SpO2; 90 - 95%  Continuous,   Status:  Canceled         02/14/24 1134    02/14/24 1135  Pulse Oximetry, Continuous  Continuous,   Status:  Canceled          02/14/24 1134    02/14/24 1135  Insert Peripheral IV  Once,   Status:  Canceled         02/14/24 1134    02/14/24 1135  Saline Lock & Maintain IV Access  Continuous,   Status:  Canceled         02/14/24 1134    02/14/24 1134  Vital Signs - Per Anesthesia Protocol  As Needed,   Status:  Canceled       02/14/24 1134    02/14/24 1134  sodium chloride 0.9 % flush 10 mL  As Needed,   Status:  Discontinued         02/14/24 1134    02/14/24 1134  fentaNYL citrate (PF) (SUBLIMAZE) injection 25 mcg  Every 5 Minutes PRN,   Status:  Discontinued         02/14/24 1134    02/14/24 1134  dextrose (D50W) (25 g/50 mL) IV injection 12.5 g  As Needed,   Status:  Discontinued         02/14/24 1134    02/14/24 0944  POC Glucose Once  PROCEDURE ONCE        Comments: Complete no more than 45 minutes prior to patient eating      02/14/24 0931    02/14/24 0903  ceFAZolin 2000 mg IVPB in 100 mL NS (MBP)  Once         02/14/24 0901    02/14/24 0903  oxyCODONE ER (oxyCONTIN) 12 hr tablet 20 mg  Once         02/14/24 0901    02/14/24 0903  sodium chloride 0.9 % flush 10 mL  Every 12 Hours Scheduled,   Status:  Discontinued         02/14/24 0901    02/14/24 0903  lactated ringers infusion 1,000 mL  Continuous         02/14/24 0901    02/14/24 0903  lactated ringers infusion 1,000 mL  Continuous         02/14/24 0901    02/14/24 0902  Type & Screen  Once         02/14/24 0901    02/14/24 0902  Insert Peripheral IV  Once,   Status:  Canceled         02/14/24 0901    02/14/24 0902  Saline Lock & Maintain IV Access  Continuous,   Status:  Canceled         02/14/24 0901    02/14/24 0902  Follow Anesthesia Guidelines / Protocol  Once,   Status:  Canceled         02/14/24 0901    02/14/24 0902  Insert Peripheral IV  Once,   Status:  Canceled         02/14/24 0901    02/14/24 0902  Maintain IV Access  Continuous,   Status:  Canceled         02/14/24 0901 02/14/24 0902  Insert Peripheral IV  Once,   Status:  Canceled         02/14/24 0901     02/14/24 0902  Maintain IV Access  Continuous,   Status:  Canceled         02/14/24 0901    02/14/24 0902  Please Insert a Second Peripheral IV If Indicated For Procedure (All DaVinci Cases, Gastric Bypass, Sleeve Surgery, AAA, Any Vascular Bypass, Carotid, Sigmoidectomy, Colectomy (Lap Assisted), Colon Resection, Nissen, Exploratory Laparotomy, Spinal...  Once,   Status:  Canceled        Comments: Please Insert a Second Peripheral IV If Indicated For Procedure (All DaVinci Cases, Gastric Bypass, Sleeve Surgery, AAA, Any Vascular Bypass, Carotid, Sigmoidectomy, Colectomy (Lap Assisted), Colon Resection, Nissen, Exploratory Laparotomy, Spinal Fusion, Craniotomy, Thoracotomy, CABG, TAVR, Valve Surgery or Mediastinoscopy, Femoral Endarterectomy, Carotid Endarterectomy, Anterior Lumbar Exposure, or all Aneurysm Repairs    02/14/24 0901 02/14/24 0902  POC Glucose STAT  STAT,   Status:  Canceled        Comments: Complete no more than 45 minutes prior to patient eating      02/14/24 0901 02/14/24 0902  Notify Anesthesia if Blood Sugar Greater Than 180  Once,   Status:  Canceled         02/14/24 0901 02/14/24 0901  sodium chloride 0.9 % flush 10 mL  As Needed,   Status:  Discontinued         02/14/24 0901 02/14/24 0901  lidocaine PF 1% (XYLOCAINE) injection 0.5 mL  Once As Needed,   Status:  Discontinued         02/14/24 0901 02/14/24 0901  sodium chloride 0.9 % flush 3 mL  As Needed,   Status:  Discontinued         02/14/24 0901 02/14/24 0901  lidocaine PF 1% (XYLOCAINE) injection 0.5 mL  Once As Needed,   Status:  Discontinued         02/14/24 0901 02/14/24 0901  sodium chloride 0.9 % flush 10 mL  As Needed,   Status:  Discontinued         02/14/24 0901 02/14/24 0901  sodium chloride 0.9 % infusion 40 mL  As Needed,   Status:  Discontinued         02/14/24 0901 02/14/24 0901  lactated ringers infusion  Continuous PRN         02/14/24 0901 02/14/24 0734  XR Spine Lumbar AP & Lateral  1 Time  Imaging         02/14/24 0733    02/14/24 0734  FL C Arm During Surgery  1 Time Imaging         02/14/24 0733    Unscheduled  Apply ice to affected area/incisions as needed for pain or swelling  As Needed       02/14/24 1723    Unscheduled  Follow Hypoglycemia Standing Orders For Blood Glucose <70 & Notify Provider of Treatment  As Needed      Comments: Follow Hypoglycemia Orders As Outlined in Process Instructions (Open Order Report to View Full Instructions)  Notify Provider Any Time Hypoglycemia Treatment is Administered    02/15/24 0637                     Operative/Procedure Notes (all)        CHARITO Garcia MD at 02/14/24 1310  Version 1 of 1         LUMBAR LATERAL INTERBODY FUSION  Procedure Note    Manuel Yves Blanka  2/14/2024    Pre-op Diagnosis:     1. Status post left L4-5 microdiskectomy, 11/18/2019   2. Status post revision left L4-5 microdiskectomy, insertion annular closure device, 11/01/2021   3. Status post anterior decompression, LEIGH with instrumentation L5-S1, 12/19/2022   4. Status post left LLIF with instrumentation L4-5, 12/21/2022   5. Status post PSF with instrumentation L4-S1, 12/23/2022  6. Persistent chronic low back pain   7. Bilateral lower extremity weakness   8. Persistent left lower extremity numbness, tingling   9. Adjacent level degenerative disc disease L2-4   10. Facet arthropathy L2-4   11. Retrolisthesis L3-4   12. Chronic central disc herniation L2-3, L3-4   13. Central and foraminal stenosis L2-4   14. History of diabetic peripheral neuropathy   15. History of alpha gal syndrome after tick bite    Post-op Diagnosis:    same    Procedure/CPT® Codes:    1.  Right lateral lumbar interbody fusion L2-3, L3-4  2.  Anterior spinal instrumentation L2-3 (ATE lateral plate and screws)  3.  Use of titanium interbody biomechanical device for fusion L2-3, L3-4 ([employer] titanium spacer x 1, [employer] titanium spacer x 1)  4.  Use of allograft bone matrix for fusion ([employer])  5.  Use of fluoroscopy for confirmation of surgical level, placement of titanium spacers, and instrumentation  6.  Intraoperative neural monitoring    Anesthesia: General    Surgeon: CASSIE Garcia MD    Assistant: Bhargav Nolen PA-C    Estimated Blood Loss: Minimal    Complications: None    Condition: Stable to PACU.    Indications:    The patient is a 63-year-old who initially underwent a left L4-5 microdiscectomy on 11/18/2019 followed by revision microdiscectomy on 11/1/2021.  He then underwent a staged fusion procedure from L4-S1 in December 2022.  Unfortunately he returned to the office with persistent chronic low back pain along with bilateral lower extremity weakness and persistent left lower extremity numbness and tingling.  Repeat imaging studies revealed adjacent level degenerative disc disease and facet arthropathy with chronic central disc herniations at both L2-3 and L3-4 causing central and foraminal stenosis.  Of note, the patient did have a history of diabetic peripheral neuropathy as well as alpha gal syndrome after a tick bite.     After failing conservative measures, it was mutually decided that surgery would be the best option. Risks, benefits, and complications of surgery were discussed with the patient. The patient appeared well informed and wished to proceed. We specifically discussed the risk of infection, blood loss, nerve root injury, CSF leak, and the possibility of incomplete resolution of symptoms. We also discussed the possible risk of a nonunion and the potential need for additional surgery in the event of a pseudoarthrosis or hardware failure.     We elected proceed with a staged operation.  Today were performing a lateral fusion from L2 to 4.  It is planned that we will be returning to surgery for a second posterior procedure involving a removal of his existing instrumentation from L4-S1 and a posterior spinal fusion of L2-4 with instrumentation spanning  L2-S1.    Operative Procedure:    After obtaining informed consent and verifying the correct operative site, the patient was brought to the operating room and placed supine on an operating table.  A general anesthetic was provided by the anesthesia service with the assistance of an endotracheal tube.  Once this was appropriately positioned and secured, the patient was carefully rotated into the lateral decubitus position with the right side up.  All bony prominences were well-padded.  3 inch wide cloth tape was used to maintain the patient's position.  It was also used to tip open the pelvis slightly allowing better access to the lumbar spine through a lateral approach.  Fluoroscopy was then used to identify the disc spaces of L2-3 and L3-4, and the skin was marked for our planned incision.  The right flank was then prepped and draped in the usual sterile fashion.  A surgical timeout was taken to confirm this was the correct patient, we were working at the correct levels, and that preoperative antibiotics were given in a timely fashion.    An oblique incision was created of the right flank using a 10 blade scalpel directly centered between the L2-3 and L3-4 segments. Dissection was carried bluntly through subcutaneous tissues. Blunt dissection was also carried between the external oblique and internal oblique musculature. The transversalis fascia was pierced allowing access to the retroperitoneal space. At this point, a series of neuro monitoring probes were used to safely access first the L3-4 level. We used stimulated and free run EMG for this purpose. Once nerve roots were confirmed to be out of harm's way, self retaining retractors were placed for continuous exposure.     An annulotomy was then created at the L3-4 area using a 15 blade scalpel on a long handle. A Fisher elevator was used to remove disc material off of the endplates. Disc material was removed using Kerrisons, pituitaries, and forward angled  curettes. Once all disc material had been retrieved, I used the Fisher elevator to divide the contralateral annulus. This assisted with mobilization of the vertebral bodies. We then used a series of endplate scrapers to prepare the endplates for interbody fusion. Trial spacers were malleted into position and for this disc space it was felt that a 22 mm x 55 mm expandable implant would be the best fit to restore disc height. The disc space was then thoroughly irrigated with saline solution.     A titanium spacer from the [employer] instrumentation set measuring 22 mm x 55 mm was then packed with a allograft bone matrix called [employer] as tightly as possible. This titanium spacer was then placed into the L3-4 disc space under fluoroscopic guidance. It was used as a titanium interbody biomechanical device to assist with interbody fusion.  Once the spacer was properly positioned, I used the appropriate  to expand the spacer is much as possible.  This created lordosis and increased disc height.  I then used a series of graft tubes to fill the now expanded spacer with as much bone graft as possible.  After confirming the spacer was properly positioned in the L3-4 disc space, the area was thoroughly inspected to ensure that we had adequate hemostasis.  Bleeding at this point was controlled using thrombin gelfoam powder and bipolar cautery. The self retaining retractors were then removed and attention was turned L2-3.    Once again the neuro monitoring probes were used in the retroperitoneal space, this time to access L2-3.  We used stimulated and free run EMG for this purpose.  Once nerve roots were confirmed to be out of harm's way, self retaining retractors were placed for continuous exposure of L2-3.  The L2-3 disc space was prepared in identical fashion as L3-4.  We used the Fisher elevators to remove disc material off of the endplates.  Disc material was retrieved using forward angled curettes,  pituitaries, and Kerrisons.  We then used a series of endplate scrapers to prepare the endplates for interbody fusion.  The Fisher elevator was used to divide the contralateral annulus helping to mobilize the vertebral bodies.  Trial spacers were then malleted into position.  It was felt for this disc space a 12 mm x 55 mm implant would be the best fit to restore disc height. The disc space was then thoroughly irrigated with saline solution.    A second titanium spacer this time from the [facility] instrumentation set measuring 12 mm x 55 mm x 22 mm was then packed with the same allograft bone matrix as tightly as possible. This titanium spacer was then malleted into the L2-3 disc space under fluoroscopic guidance. It was placed as a titanium interbody biomechanical device to assist with interbody fusion.  Once this spacer was confirmed to be properly positioned, I chose a 2-hole plate to help augment the fusion of L2-3. This plate was held into position using 2 screws. I placed 5.5 mm by 35 mm screws into both L2 and L3 as fixation.     The wound was then irrigated thoroughly. A thorough inspection was then undertaken to ensure that we had adequate hemostasis. Bleeding at this point was controlled using FloSeal and bipolar cautery.     After ensuring that we had adequate hemostasis in the area, final fluoroscopy imaging was taken to confirm adequate position of both titanium spacers as well as the instrumentation.  There was excellent restoration of disc height and correction of scoliosis compared to preoperative films.  Again we ensure that we had adequate hemostasis by using bipolar cautery and thrombin gelfoam powder.    Closure was then accomplished with a #1 Vicryl reapproximating the transversalis fascia as well as the internal and external oblique musculature. Immediate subcutaneous cutaneous tissues were closed with a 3-0 Vicryl and skin closure was accomplished using Mastisol and Steri-Strips. The wound was then  sterilely dressed. The patient was then carefully rotated supine onto a hospital gurney, extubated, and sent to the recovery room in good stable condition.     The patient tolerated the procedure well. There were no complications. We estimated blood loss to be minimal. The patient remained hemodynamically stable.    Intraoperative neuro monitoring was ordered and carried out throughout the procedure to add an increased level of safety for the patient.  The interpreting physician was available by means of real-time continuous, bidirectional, remote audio and visual communication as needed throughout the entire procedure.  Modalities used during the procedure included SSEP, EEG, MEP, EMG, and TOF.  There were no neuro monitoring signal changes during the procedure.     Bhargav Nolen PA-C provided critical assistance during the procedure. His assistance was medically necessary in order to allow the procedure to occur in the most safe and efficient manner.  He assisted not only with patient positioning and wound closure, but more importantly, he also assisted with disc space preparation, as well as the placement of titanium spacers and instrumentation to obtain a fusion.    CASSIE Garcia MD     Date: 2/14/2024  Time: 14:52 CST          Electronically signed by CHARITO Garcia MD at 02/14/24 9556

## 2024-02-15 NOTE — THERAPY DISCHARGE NOTE
Acute Care - Occupational Therapy Initial Evaluation/Discharge  [facility]     Patient Name: Manuel Moscoso  : 1960  MRN: [id number]  Today's Date: 2/15/2024  Onset of Illness/Injury or Date of Surgery: 24  Date of Referral to OT: 24  Referring Physician: Bhargav Nolen PA-C      Admit Date: 2024       ICD-10-CM ICD-9-CM   1. Impaired mobility [Z74.09]  Z74.09 799.89     Patient Active Problem List   Diagnosis    Lumbosacral disc disease    Chronic bilateral low back pain without sciatica    Lumbago of multiple sites in spine with sciatica    Type 2 diabetes mellitus with hyperglycemia, without long-term current use of insulin    Essential hypertension    Drug-induced constipation    Lumbar stenosis     Past Medical History:   Diagnosis Date    Allergy to alpha-gal     Arthritis     Chronic bilateral low back pain without sciatica 2022    Diabetes mellitus     Hearing loss, left     History of staph infection     Hypertension     Low back pain     Lumbosacral disc disease 2022    Psoriasis     Sleep apnea     Tinnitus     Vision disturbance     using rx eyedrops     Past Surgical History:   Procedure Laterality Date    ANTERIOR LUMBAR EXPOSURE N/A 2022    Procedure: ANTERIOR LUMBAR EXPOSURE;  Surgeon: Simba Dyson DO;  Location: [facility] OR;  Service: Vascular;  Laterality: N/A;    BACK SURGERY      x 2    CHOLECYSTECTOMY      COLONOSCOPY      GANGLION CYST EXCISION Left     wrist    LIPOMA EXCISION      back    LUMBAR FUSION Left 2022    Procedure: LEFT LATERAL LUMBAR INTERBODY FUSION WITH INSTRUMENTATION L4-5;  Surgeon: CHARITO Garcia MD;  Location: [facility] OR;  Service: Orthopedic Spine;  Laterality: Left;    LUMBAR FUSION N/A 2022    Procedure: ANTERIOR DECOMPRESSION, ANTERIOR LUMBAR INTERBODY FUSION WITH INSTRUMENTATION L5-S1;  Surgeon: CHARITO Garcia MD;  Location:  [facility] OR;  Service: Orthopedic Spine;  Laterality: N/A;    LUMBAR  LAMINECTOMY WITH FUSION N/A 12/23/2022    Procedure: POSTERIOR SPINAL FUSION WITH INSTRUMENTATION L4-S1;  Surgeon: CHARITO Garcia MD;  Location: [facility];  Service: Orthopedic Spine;  Laterality: N/A;       OT ASSESSMENT FLOWSHEET (last 12 hours)       OT Evaluation and Treatment       Row Name 02/15/24 5608                   OT Time and Intention    Subjective Information complains of;pain;numbness  owen knee to toes numbness/tingling  -AC (r) HW (t) AC (c)        Document Type discharge evaluation/summary  -AC (r) HW (t) AC (c)        Mode of Treatment occupational therapy;co-treatment  -AC (r) HW (t) AC (c)        Patient Effort excellent  -AC (r) HW (t) AC (c)        Comment --  -AC (r) HW (t) AC (c)           General Information    Patient Profile Reviewed yes  -AC (r) HW (t) AC (c)        Onset of Illness/Injury or Date of Surgery 02/14/24  -AC (r) HW (t) AC (c)        Referring Physician Bhargav Nolen PA-C  -AC (r) HW (t) AC (c)        Prior Level of Function independent:;community mobility;ADL's;driving;shopping;cooking;cleaning;all household mobility  -AC (r) HW (t) AC (c)        Equipment Currently Used at Home walker, rolling;shoe horn  reacher  -AC (r) HW (t) AC (c)        Pertinent History of Current Functional Problem persistent chronic low back pain, bilateral lower extremity weakness, persistent LLE numbness/tingling; dx: degenerative disc disease; s/p right lateral lumbar interbody fusion L2-3, L3-4 on 02/14/24; PMH: diabetic peripheral neuropathy, alpha gal syndrome  -AC (r) HW (t) AC (c)        Existing Precautions/Restrictions fall;spinal;LSO;brace worn when out of bed  -AC (r) HW (t) AC (c)        Equipment Issued to Patient gait belt  -AC (r) HW (t) AC (c)        Risks Reviewed patient:;nausea/vomiting;dizziness;increased discomfort;change in vital signs  -AC (r) HW (t) AC (c)        Benefits Reviewed patient:;improve function;increase independence;increase strength;increase  balance;improve skin integrity;decrease pain  -AC (r) HW (t) AC (c)           Living Environment    Current Living Arrangements home  step over tub  -AC (r) HW (t) AC (c)        Home Accessibility stairs to enter home  -AC (r) HW (t) AC (c)        People in Home spouse;child(nayeli), adult  -AC (r) HW (t) AC (c)        Primary Care Provided by self  -AC (r) HW (t) AC (c)           Home Main Entrance    Number of Stairs, Main Entrance two  -AC (r) HW (t) AC (c)        Stair Railings, Main Entrance none  -AC (r) HW (t) AC (c)           Pain Assessment    Pretreatment Pain Rating 4/10  -AC (r) HW (t) AC (c)        Posttreatment Pain Rating 4/10  -AC (r) HW (t) AC (c)        Pain Location lower  -AC (r) HW (t) AC (c)        Pain Location - back  -AC (r) HW (t) AC (c)        Pain Intervention(s) Ambulation/increased activity;Repositioned;Rest  -AC (r) HW (t) AC (c)           Cognition    Orientation Status (Cognition) oriented x 4  -AC (r) HW (t) AC (c)        Personal Safety Interventions fall prevention program maintained;gait belt;nonskid shoes/slippers when out of bed;supervised activity  -AC (r) HW (t) AC (c)           Range of Motion Comprehensive    Comment, General Range of Motion BUE AROM WNL  -AC (r) HW (t) AC (c)           Strength Comprehensive (MMT)    Comment, General Manual Muscle Testing (MMT) Assessment BUE strength 4+/5  -AC (r) HW (t) AC (c)           Activities of Daily Living    BADL Assessment/Intervention lower body dressing;upper body dressing;toileting  -AC (r) HW (t) AC (c)           Upper Body Dressing Assessment/Training    [place] Level (Upper Body Dressing) doff;don;other (see comments);standby assist  LSO  -AC (r) HW (t) AC (c)        Position (Upper Body Dressing) edge of bed sitting  -AC (r) HW (t) AC (c)           Lower Body Dressing Assessment/Training    [place] Level (Lower Body Dressing) doff;don;socks;moderate assist (50% patient effort);other (see comments);standby assist   undergarment  -AC (r) HW (t) AC (c)        Position (Lower Body Dressing) edge of bed sitting  -AC (r) HW (t) AC (c)           Toileting Assessment/Training    [place] Level (Toileting) adjust/manage clothing;modified independence  -AC (r) HW (t) AC (c)        Position (Toileting) supported standing;unsupported standing  -AC (r) HW (t) AC (c)           BADL Safety/Performance    Impairments, BADL Safety/Performance sensory awareness;strength;pain;range of motion  BLE  -AC (r) HW (t) AC (c)           Bed Mobility    Bed Mobility rolling left;supine-sit  -AC (r) HW (t) AC (c)        Rolling Left [place] (Bed Mobility) standby assist  -AC (r) HW (t) AC (c)        Supine-Sit [place] (Bed Mobility) standby assist  -AC (r) HW (t) AC (c)           Functional Mobility    Functional Mobility- Ind. Level contact guard assist  -AC (r) HW (t) AC (c)        Functional Mobility- Device walker, front-wheeled  -AC (r) HW (t) AC (c)        Functional Mobility- Comment bed to hallway  -AC (r) HW (t) AC (c)        Patient was able to Ambulate yes  -AC (r) HW (t) AC (c)           Transfer Assessment/Treatment    Transfers sit-stand transfer;stand-sit transfer  -AC (r) HW (t) AC (c)           Sit-Stand Transfer    Sit-Stand [place] (Transfers) contact guard;verbal cues  -AC (r) HW (t) AC (c)        Assistive Device (Sit-Stand Transfers) walker, front-wheeled  -AC (r) HW (t) AC (c)        Comment, (Sit-Stand Transfer) vc to push off bed  -AC (r) HW (t) AC (c)           Stand-Sit Transfer    Stand-Sit [place] (Transfers) contact guard  -AC (r) HW (t) AC (c)        Assistive Device (Stand-Sit Transfers) walker, front-wheeled  -AC (r) HW (t) AC (c)           Safety Issues, Functional Mobility    Impairments Affecting Function (Mobility) sensation/sensory awareness;strength;pain;range of motion (ROM)  -AC (r) HW (t) AC (c)           Balance    Balance Assessment sitting static balance;sitting dynamic balance;standing  static balance;standing dynamic balance  -AC (r) HW (t) AC (c)        Static Sitting Balance standby assist  -AC (r) HW (t) AC (c)        Dynamic Sitting Balance standby assist  -AC (r) HW (t) AC (c)        Position, Sitting Balance sitting edge of bed;unsupported  -AC (r) HW (t) AC (c)        Static Standing Balance contact guard  -AC (r) HW (t) AC (c)        Dynamic Standing Balance contact guard  -AC (r) HW (t) AC (c)        Position/Device Used, Standing Balance walker, front-wheeled;supported  -AC (r) HW (t) AC (c)           Wound 02/14/24 1310 Right lower flank Incision    Wound - Properties Group Placement Date: 02/14/24  -KT Placement Time: 1310  -KT Present on Original Admission: N  -KT Side: Right  -KT Orientation: lower  -KT Location: flank  -KT Primary Wound Type: Incision  -KT    Retired Wound - Properties Group Placement Date: 02/14/24  -KT Placement Time: 1310  -KT Present on Original Admission: N  -KT Side: Right  -KT Orientation: lower  -KT Location: flank  -KT Primary Wound Type: Incision  -KT    Retired Wound - Properties Group Date first assessed: 02/14/24  -KT Time first assessed: 1310  -KT Present on Original Admission: N  -KT Side: Right  -KT Location: flank  -KT Primary Wound Type: Incision  -KT       Plan of Care Review    Plan of Care Reviewed With patient  -AC (r) HW (t) AC (c)        Progress no change  -AC (r) HW (t) AC (c)        Outcome Evaluation OT eval complete. Pt A&O x4. Pt lives at home w/ spouse and adult daughter. PLOF I in ADLs, cooking, driving, shopping, household/community mobility. Pt s/p R lateral lumbar interbody fusion on 02/14/24 & due for another surgery on 02/16/24. Pt SBA for static/dynamic sitting bal, rolling R, and supine to sit. Mod A doff/don socks. SBA to don underwear. Pt able to recite spinal precautions with cues. CGA for sit <> stand w/ vc to push off the bed. CGA for ambulation. Mod I for toileting w/ rwx, supported standing. Pt appears near baseline  function except with LB dressing. Pt appears to have weakness in L hip flexor post op, but has shown improvement since yesterday. OT will continue to follow pt and re-eval after second stage to address LB dressing as indicated.  -AC (r) HW (t) AC (c)           Positioning and Restraints    Pre-Treatment Position in bed  -AC (r) HW (t) AC (c)        Post Treatment Position chair  -AC (r) HW (t) AC (c)        In Chair sitting;call light within reach;encouraged to call for assist;with brace  LSO  -AC (r) HW (t) AC (c)           Therapy Assessment/Plan (OT)    Date of Referral to OT 02/14/24  -AC (r) HW (t) AC (c)        Criteria for Skilled Therapeutic Interventions Met (OT) no;no problems identified which require skilled intervention  -AC (r) HW (t) AC (c)        Therapy Frequency (OT) evaluation only  -AC (r) HW (t) AC (c)           Therapy Plan Review/Discharge Plan (OT)    Anticipated Discharge Disposition (OT) home with assist  -AC (r) HW (t) AC (c)                  User Key  (r) = Recorded By, (t) = Taken By, (c) = Cosigned By      Initials Name Effective Dates    AC Abilio Castillo, OTR/L, CNT 02/03/23 -     Anders Milan RN 02/17/22 -     Ml Whitfield OT Student 01/09/24 -                     Occupational Therapy Education       Title: PT OT SLP Therapies (In Progress)       Topic: Occupational Therapy (In Progress)       Point: ADL training (Done)       Description:   Instruct learner(s) on proper safety adaptation and remediation techniques during self care or transfers.   Instruct in proper use of assistive devices.                  Learning Progress Summary             Patient Acceptance, E, VU by  at 2/15/2024 4621    Comment: Role of OT, spinal precautions, brace wear/care, incentive spirometry, benefits of ambulation, OT POC                         Point: Home exercise program (Not Started)       Description:   Instruct learner(s) on appropriate technique for monitoring, assisting and/or  progressing therapeutic exercises/activities.                  Learner Progress:  Not documented in this visit.              Point: Precautions (Done)       Description:   Instruct learner(s) on prescribed precautions during self-care and functional transfers.                  Learning Progress Summary             Patient Acceptance, E, VU by  at 2/15/2024 0847    Comment: Role of OT, spinal precautions, brace wear/care, incentive spirometry, benefits of ambulation, OT POC                         Point: Body mechanics (Done)       Description:   Instruct learner(s) on proper positioning and spine alignment during self-care, functional mobility activities and/or exercises.                  Learning Progress Summary             Patient Acceptance, E, VU by  at 2/15/2024 0847    Comment: Role of OT, spinal precautions, brace wear/care, incentive spirometry, benefits of ambulation, OT POC                                         User Key       Initials Effective Dates Name Provider Type Discipline     01/09/24 -  Ml Barba OT Student OT Student OT                    OT Recommendation and Plan  Therapy Frequency (OT): evaluation only  Plan of Care Review  Plan of Care Reviewed With: patient  Progress: no change  Outcome Evaluation: OT eval complete. Pt A&O x4. Pt lives at home w/ spouse and adult daughter. PLOF I in ADLs, cooking, driving, shopping, household/community mobility. Pt s/p R lateral lumbar interbody fusion on 02/14/24 & due for another surgery on 02/16/24. Pt SBA for static/dynamic sitting bal, rolling R, and supine to sit. Mod A doff/don socks. SBA to don underwear. Pt able to recite spinal precautions with cues. CGA for sit <> stand w/ vc to push off the bed. CGA for ambulation. Mod I for toileting w/ rwx, supported standing. Pt appears near baseline function except with LB dressing. Pt appears to have weakness in L hip flexor post op, but has shown improvement since yesterday. OT will continue  to follow pt and re-eval after second stage to address LB dressing as indicated.  Plan of Care Reviewed With: patient  Outcome Evaluation: OT eval complete. Pt A&O x4. Pt lives at home w/ spouse and adult daughter. PLOF I in ADLs, cooking, driving, shopping, household/community mobility. Pt s/p R lateral lumbar interbody fusion on 02/14/24 & due for another surgery on 02/16/24. Pt SBA for static/dynamic sitting bal, rolling R, and supine to sit. Mod A doff/don socks. SBA to don underwear. Pt able to recite spinal precautions with cues. CGA for sit <> stand w/ vc to push off the bed. CGA for ambulation. Mod I for toileting w/ rwx, supported standing. Pt appears near baseline function except with LB dressing. Pt appears to have weakness in L hip flexor post op, but has shown improvement since yesterday. OT will continue to follow pt and re-eval after second stage to address LB dressing as indicated.          Outcome Measures       Row Name 02/15/24 0821             How much help from another is currently needed...    Putting on and taking off regular lower body clothing? 3  -AC (r) HW (t) AC (c)      Bathing (including washing, rinsing, and drying) 3  -AC (r) HW (t) AC (c)      Toileting (which includes using toilet bed pan or urinal) 3  -AC (r) HW (t) AC (c)      Putting on and taking off regular upper body clothing 4  -AC (r) HW (t) AC (c)      Taking care of personal grooming (such as brushing teeth) 4  -AC (r) HW (t) AC (c)      Eating meals 4  -AC (r) HW (t) AC (c)      AM-PAC 6 Clicks Score (OT) 21  -AC (r) HW (t)         Functional Assessment    Outcome Measure Options AM-PAC 6 Clicks Daily Activity (OT)  -AC (r) HW (t) AC (c)                User Key  (r) = Recorded By, (t) = Taken By, (c) = Cosigned By      Initials Name Provider Type    AC Abilio Castillo, OTR/L, CNT Occupational Therapist    Ml Whitfield, OT Student OT Student                    Time Calculation:    Time Calculation- OT       Row Name  02/15/24 0844             Time Calculation- OT    OT Start Time 0730  -AC (r) HW (t) AC (c)      OT Stop Time 0823  -AC (r) HW (t) AC (c)      OT Time Calculation (min) 53 min  -AC (r) HW (t)      OT Received On 02/15/24  -AC (r) HW (t) AC (c)      OT Goal Re-Cert Due Date --  -AC (r) HW (t) AC (c)                User Key  (r) = Recorded By, (t) = Taken By, (c) = Cosigned By      Initials Name Provider Type    AC Abilio Castillo, OTR/L, CNT Occupational Therapist    Ml Whitfield, OT Student OT Student                             OT Discharge Summary  Anticipated Discharge Disposition (OT): home with assist    KURT Mahajan  2/15/2024

## 2024-02-16 ENCOUNTER — ANESTHESIA (OUTPATIENT)
Dept: PERIOP | Facility: HOSPITAL | Age: 64
End: 2024-02-16
Payer: COMMERCIAL

## 2024-02-16 ENCOUNTER — APPOINTMENT (OUTPATIENT)
Dept: GENERAL RADIOLOGY | Facility: HOSPITAL | Age: 64
DRG: 455 | End: 2024-02-16
Payer: COMMERCIAL

## 2024-02-16 ENCOUNTER — ANESTHESIA EVENT (OUTPATIENT)
Dept: PERIOP | Facility: HOSPITAL | Age: 64
End: 2024-02-16
Payer: COMMERCIAL

## 2024-02-16 LAB
FOLATE SERPL-MCNC: 13.5 NG/ML (ref 4.78–24.2)
GLUCOSE BLDC GLUCOMTR-MCNC: 123 MG/DL (ref 70–130)
GLUCOSE BLDC GLUCOMTR-MCNC: 131 MG/DL (ref 70–130)
GLUCOSE BLDC GLUCOMTR-MCNC: 156 MG/DL (ref 70–130)
GLUCOSE BLDC GLUCOMTR-MCNC: 171 MG/DL (ref 70–130)
IRON 24H UR-MRATE: 73 MCG/DL (ref 59–158)
IRON SATN MFR SERPL: 24 % (ref 20–50)
TIBC SERPL-MCNC: 307 MCG/DL (ref 298–536)
TRANSFERRIN SERPL-MCNC: 206 MG/DL (ref 200–360)
VIT B12 BLD-MCNC: 485 PG/ML (ref 211–946)

## 2024-02-16 PROCEDURE — 25010000002 ONDANSETRON PER 1 MG: Performed by: ORTHOPAEDIC SURGERY

## 2024-02-16 PROCEDURE — 25010000002 DROPERIDOL PER 5 MG: Performed by: ANESTHESIOLOGY

## 2024-02-16 PROCEDURE — 25010000002 HYDROMORPHONE PER 4 MG: Performed by: ANESTHESIOLOGY

## 2024-02-16 PROCEDURE — 82607 VITAMIN B-12: CPT | Performed by: FAMILY MEDICINE

## 2024-02-16 PROCEDURE — 76000 FLUOROSCOPY <1 HR PHYS/QHP: CPT

## 2024-02-16 PROCEDURE — 0SP304Z REMOVAL OF INTERNAL FIXATION DEVICE FROM LUMBOSACRAL JOINT, OPEN APPROACH: ICD-10-PCS | Performed by: ORTHOPAEDIC SURGERY

## 2024-02-16 PROCEDURE — C1713 ANCHOR/SCREW BN/BN,TIS/BN: HCPCS | Performed by: ORTHOPAEDIC SURGERY

## 2024-02-16 PROCEDURE — 0 BUPIVACAINE LIPOSOME 1.3 % SUSPENSION 10 ML VIAL: Performed by: ORTHOPAEDIC SURGERY

## 2024-02-16 PROCEDURE — C1769 GUIDE WIRE: HCPCS | Performed by: ORTHOPAEDIC SURGERY

## 2024-02-16 PROCEDURE — 0SP004Z REMOVAL OF INTERNAL FIXATION DEVICE FROM LUMBAR VERTEBRAL JOINT, OPEN APPROACH: ICD-10-PCS | Performed by: ORTHOPAEDIC SURGERY

## 2024-02-16 PROCEDURE — 82746 ASSAY OF FOLIC ACID SERUM: CPT | Performed by: FAMILY MEDICINE

## 2024-02-16 PROCEDURE — 25810000003 LACTATED RINGERS PER 1000 ML: Performed by: ANESTHESIOLOGY

## 2024-02-16 PROCEDURE — 25810000003 SODIUM CHLORIDE 0.9 % SOLUTION: Performed by: ORTHOPAEDIC SURGERY

## 2024-02-16 PROCEDURE — 25010000002 ONDANSETRON PER 1 MG: Performed by: NURSE ANESTHETIST, CERTIFIED REGISTERED

## 2024-02-16 PROCEDURE — 0SG3071 FUSION OF LUMBOSACRAL JOINT WITH AUTOLOGOUS TISSUE SUBSTITUTE, POSTERIOR APPROACH, POSTERIOR COLUMN, OPEN APPROACH: ICD-10-PCS | Performed by: ORTHOPAEDIC SURGERY

## 2024-02-16 PROCEDURE — 83540 ASSAY OF IRON: CPT | Performed by: FAMILY MEDICINE

## 2024-02-16 PROCEDURE — 25010000002 SUGAMMADEX 200 MG/2ML SOLUTION: Performed by: NURSE ANESTHETIST, CERTIFIED REGISTERED

## 2024-02-16 PROCEDURE — 82948 REAGENT STRIP/BLOOD GLUCOSE: CPT

## 2024-02-16 PROCEDURE — C9290 INJ, BUPIVACAINE LIPOSOME: HCPCS | Performed by: ORTHOPAEDIC SURGERY

## 2024-02-16 PROCEDURE — 84466 ASSAY OF TRANSFERRIN: CPT | Performed by: FAMILY MEDICINE

## 2024-02-16 PROCEDURE — 4A11X4G MONITORING OF PERIPHERAL NERVOUS ELECTRICAL ACTIVITY, INTRAOPERATIVE, EXTERNAL APPROACH: ICD-10-PCS | Performed by: ORTHOPAEDIC SURGERY

## 2024-02-16 PROCEDURE — 25810000003 SODIUM CHLORIDE PER 500 ML: Performed by: ORTHOPAEDIC SURGERY

## 2024-02-16 PROCEDURE — 0SG1071 FUSION OF 2 OR MORE LUMBAR VERTEBRAL JOINTS WITH AUTOLOGOUS TISSUE SUBSTITUTE, POSTERIOR APPROACH, POSTERIOR COLUMN, OPEN APPROACH: ICD-10-PCS | Performed by: ORTHOPAEDIC SURGERY

## 2024-02-16 PROCEDURE — 72100 X-RAY EXAM L-S SPINE 2/3 VWS: CPT

## 2024-02-16 PROCEDURE — 25010000002 CEFAZOLIN PER 500 MG: Performed by: PHYSICIAN ASSISTANT

## 2024-02-16 PROCEDURE — 25010000002 FENTANYL CITRATE (PF) 250 MCG/5ML SOLUTION: Performed by: NURSE ANESTHETIST, CERTIFIED REGISTERED

## 2024-02-16 PROCEDURE — 25810000003 LACTATED RINGERS PER 1000 ML: Performed by: ORTHOPAEDIC SURGERY

## 2024-02-16 PROCEDURE — 25010000002 FENTANYL CITRATE (PF) 50 MCG/ML SOLUTION: Performed by: ANESTHESIOLOGY

## 2024-02-16 PROCEDURE — 63710000001 INSULIN LISPRO (HUMAN) PER 5 UNITS: Performed by: FAMILY MEDICINE

## 2024-02-16 PROCEDURE — 25010000002 PROPOFOL 10 MG/ML EMULSION: Performed by: NURSE ANESTHETIST, CERTIFIED REGISTERED

## 2024-02-16 PROCEDURE — 25010000002 ONDANSETRON PER 1 MG: Performed by: PHYSICIAN ASSISTANT

## 2024-02-16 DEVICE — KT HEMOST ABS SURGIFOAM PORCN 1GRAM: Type: IMPLANTABLE DEVICE | Site: SPINE LUMBAR | Status: FUNCTIONAL

## 2024-02-16 DEVICE — IMPLANTABLE DEVICE: Type: IMPLANTABLE DEVICE | Site: SPINE LUMBAR | Status: FUNCTIONAL

## 2024-02-16 RX ORDER — SODIUM CHLORIDE 0.9 % (FLUSH) 0.9 %
3-10 SYRINGE (ML) INJECTION AS NEEDED
Status: DISCONTINUED | OUTPATIENT
Start: 2024-02-16 | End: 2024-02-16 | Stop reason: HOSPADM

## 2024-02-16 RX ORDER — LIDOCAINE HYDROCHLORIDE 20 MG/ML
INJECTION, SOLUTION EPIDURAL; INFILTRATION; INTRACAUDAL; PERINEURAL AS NEEDED
Status: DISCONTINUED | OUTPATIENT
Start: 2024-02-16 | End: 2024-02-16 | Stop reason: SURG

## 2024-02-16 RX ORDER — IBUPROFEN 600 MG/1
600 TABLET ORAL ONCE AS NEEDED
Status: DISCONTINUED | OUTPATIENT
Start: 2024-02-16 | End: 2024-02-16 | Stop reason: HOSPADM

## 2024-02-16 RX ORDER — SODIUM CHLORIDE, SODIUM LACTATE, POTASSIUM CHLORIDE, CALCIUM CHLORIDE 600; 310; 30; 20 MG/100ML; MG/100ML; MG/100ML; MG/100ML
100 INJECTION, SOLUTION INTRAVENOUS CONTINUOUS PRN
Status: DISCONTINUED | OUTPATIENT
Start: 2024-02-16 | End: 2024-02-17 | Stop reason: HOSPADM

## 2024-02-16 RX ORDER — SODIUM CHLORIDE 9 MG/ML
40 INJECTION, SOLUTION INTRAVENOUS AS NEEDED
Status: DISCONTINUED | OUTPATIENT
Start: 2024-02-16 | End: 2024-02-16 | Stop reason: HOSPADM

## 2024-02-16 RX ORDER — BUPIVACAINE HCL/0.9 % NACL/PF 0.125 %
PLASTIC BAG, INJECTION (ML) EPIDURAL AS NEEDED
Status: DISCONTINUED | OUTPATIENT
Start: 2024-02-16 | End: 2024-02-16 | Stop reason: SURG

## 2024-02-16 RX ORDER — FLUMAZENIL 0.1 MG/ML
0.2 INJECTION INTRAVENOUS AS NEEDED
Status: DISCONTINUED | OUTPATIENT
Start: 2024-02-16 | End: 2024-02-16 | Stop reason: HOSPADM

## 2024-02-16 RX ORDER — SODIUM CHLORIDE, SODIUM LACTATE, POTASSIUM CHLORIDE, CALCIUM CHLORIDE 600; 310; 30; 20 MG/100ML; MG/100ML; MG/100ML; MG/100ML
100 INJECTION, SOLUTION INTRAVENOUS CONTINUOUS
Status: DISCONTINUED | OUTPATIENT
Start: 2024-02-16 | End: 2024-02-17 | Stop reason: HOSPADM

## 2024-02-16 RX ORDER — ONDANSETRON 2 MG/ML
INJECTION INTRAMUSCULAR; INTRAVENOUS AS NEEDED
Status: DISCONTINUED | OUTPATIENT
Start: 2024-02-16 | End: 2024-02-16 | Stop reason: SURG

## 2024-02-16 RX ORDER — DROPERIDOL 2.5 MG/ML
0.62 INJECTION, SOLUTION INTRAMUSCULAR; INTRAVENOUS ONCE AS NEEDED
Status: COMPLETED | OUTPATIENT
Start: 2024-02-16 | End: 2024-02-16

## 2024-02-16 RX ORDER — PROPOFOL 10 MG/ML
VIAL (ML) INTRAVENOUS AS NEEDED
Status: DISCONTINUED | OUTPATIENT
Start: 2024-02-16 | End: 2024-02-16 | Stop reason: SURG

## 2024-02-16 RX ORDER — OXYCODONE HCL 20 MG/1
20 TABLET, FILM COATED, EXTENDED RELEASE ORAL ONCE
Status: COMPLETED | OUTPATIENT
Start: 2024-02-16 | End: 2024-02-16

## 2024-02-16 RX ORDER — SODIUM CHLORIDE 0.9 % (FLUSH) 0.9 %
10 SYRINGE (ML) INJECTION EVERY 12 HOURS SCHEDULED
Status: DISCONTINUED | OUTPATIENT
Start: 2024-02-16 | End: 2024-02-16 | Stop reason: HOSPADM

## 2024-02-16 RX ORDER — MAGNESIUM HYDROXIDE 1200 MG/15ML
LIQUID ORAL AS NEEDED
Status: DISCONTINUED | OUTPATIENT
Start: 2024-02-16 | End: 2024-02-16 | Stop reason: HOSPADM

## 2024-02-16 RX ORDER — LABETALOL HYDROCHLORIDE 5 MG/ML
5 INJECTION, SOLUTION INTRAVENOUS
Status: DISCONTINUED | OUTPATIENT
Start: 2024-02-16 | End: 2024-02-16 | Stop reason: HOSPADM

## 2024-02-16 RX ORDER — SODIUM CHLORIDE 9 MG/ML
75 INJECTION, SOLUTION INTRAVENOUS CONTINUOUS
Status: DISCONTINUED | OUTPATIENT
Start: 2024-02-16 | End: 2024-02-17 | Stop reason: HOSPADM

## 2024-02-16 RX ORDER — HYDROMORPHONE HYDROCHLORIDE 1 MG/ML
0.5 INJECTION, SOLUTION INTRAMUSCULAR; INTRAVENOUS; SUBCUTANEOUS
Status: DISCONTINUED | OUTPATIENT
Start: 2024-02-16 | End: 2024-02-16 | Stop reason: HOSPADM

## 2024-02-16 RX ORDER — ROCURONIUM BROMIDE 10 MG/ML
INJECTION, SOLUTION INTRAVENOUS AS NEEDED
Status: DISCONTINUED | OUTPATIENT
Start: 2024-02-16 | End: 2024-02-16 | Stop reason: SURG

## 2024-02-16 RX ORDER — SODIUM CHLORIDE 9 MG/ML
INJECTION, SOLUTION INTRAVENOUS AS NEEDED
Status: DISCONTINUED | OUTPATIENT
Start: 2024-02-16 | End: 2024-02-16 | Stop reason: HOSPADM

## 2024-02-16 RX ORDER — FENTANYL CITRATE 50 UG/ML
50 INJECTION, SOLUTION INTRAMUSCULAR; INTRAVENOUS ONCE
Status: DISCONTINUED | OUTPATIENT
Start: 2024-02-16 | End: 2024-02-16 | Stop reason: HOSPADM

## 2024-02-16 RX ORDER — FENTANYL CITRATE 50 UG/ML
25 INJECTION, SOLUTION INTRAMUSCULAR; INTRAVENOUS
Status: DISCONTINUED | OUTPATIENT
Start: 2024-02-16 | End: 2024-02-16 | Stop reason: HOSPADM

## 2024-02-16 RX ORDER — SODIUM CHLORIDE 0.9 % (FLUSH) 0.9 %
3 SYRINGE (ML) INJECTION EVERY 12 HOURS SCHEDULED
Status: DISCONTINUED | OUTPATIENT
Start: 2024-02-16 | End: 2024-02-16 | Stop reason: HOSPADM

## 2024-02-16 RX ORDER — NALOXONE HCL 0.4 MG/ML
0.04 VIAL (ML) INJECTION AS NEEDED
Status: DISCONTINUED | OUTPATIENT
Start: 2024-02-16 | End: 2024-02-16 | Stop reason: HOSPADM

## 2024-02-16 RX ORDER — SODIUM CHLORIDE 0.9 % (FLUSH) 0.9 %
10 SYRINGE (ML) INJECTION AS NEEDED
Status: DISCONTINUED | OUTPATIENT
Start: 2024-02-16 | End: 2024-02-16 | Stop reason: HOSPADM

## 2024-02-16 RX ORDER — LIDOCAINE HYDROCHLORIDE 10 MG/ML
0.5 INJECTION, SOLUTION EPIDURAL; INFILTRATION; INTRACAUDAL; PERINEURAL ONCE AS NEEDED
Status: DISCONTINUED | OUTPATIENT
Start: 2024-02-16 | End: 2024-02-16 | Stop reason: HOSPADM

## 2024-02-16 RX ORDER — OXYCODONE AND ACETAMINOPHEN 10; 325 MG/1; MG/1
1 TABLET ORAL ONCE AS NEEDED
Status: DISCONTINUED | OUTPATIENT
Start: 2024-02-16 | End: 2024-02-16 | Stop reason: HOSPADM

## 2024-02-16 RX ORDER — MIDAZOLAM HYDROCHLORIDE 1 MG/ML
1 INJECTION INTRAMUSCULAR; INTRAVENOUS
Status: DISCONTINUED | OUTPATIENT
Start: 2024-02-16 | End: 2024-02-16 | Stop reason: HOSPADM

## 2024-02-16 RX ORDER — ONDANSETRON 2 MG/ML
4 INJECTION INTRAMUSCULAR; INTRAVENOUS
Status: DISCONTINUED | OUTPATIENT
Start: 2024-02-16 | End: 2024-02-16 | Stop reason: HOSPADM

## 2024-02-16 RX ORDER — FENTANYL CITRATE 50 UG/ML
INJECTION, SOLUTION INTRAMUSCULAR; INTRAVENOUS AS NEEDED
Status: DISCONTINUED | OUTPATIENT
Start: 2024-02-16 | End: 2024-02-16 | Stop reason: SURG

## 2024-02-16 RX ADMIN — Medication 200 MCG: at 13:20

## 2024-02-16 RX ADMIN — FENTANYL CITRATE 25 MCG: 50 INJECTION, SOLUTION INTRAMUSCULAR; INTRAVENOUS at 15:00

## 2024-02-16 RX ADMIN — LIDOCAINE HYDROCHLORIDE 100 MG: 20 INJECTION, SOLUTION EPIDURAL; INFILTRATION; INTRACAUDAL; PERINEURAL at 12:12

## 2024-02-16 RX ADMIN — FENTANYL CITRATE 50 MCG: 0.05 INJECTION, SOLUTION INTRAMUSCULAR; INTRAVENOUS at 13:16

## 2024-02-16 RX ADMIN — CEFAZOLIN 2000 MG: 2 INJECTION, POWDER, FOR SOLUTION INTRAMUSCULAR; INTRAVENOUS at 12:25

## 2024-02-16 RX ADMIN — HYDROCHLOROTHIAZIDE 12.5 MG: 25 TABLET ORAL at 08:30

## 2024-02-16 RX ADMIN — GABAPENTIN 300 MG: 300 CAPSULE ORAL at 17:23

## 2024-02-16 RX ADMIN — HYDROCODONE BITARTRATE AND ACETAMINOPHEN 1 TABLET: 7.5; 325 TABLET ORAL at 18:45

## 2024-02-16 RX ADMIN — POLYETHYLENE GLYCOL 3350 17 G: 17 POWDER, FOR SOLUTION ORAL at 20:22

## 2024-02-16 RX ADMIN — SODIUM CHLORIDE, POTASSIUM CHLORIDE, SODIUM LACTATE AND CALCIUM CHLORIDE 100 ML/HR: 600; 310; 30; 20 INJECTION, SOLUTION INTRAVENOUS at 10:00

## 2024-02-16 RX ADMIN — Medication 200 MCG: at 13:37

## 2024-02-16 RX ADMIN — CLONIDINE HYDROCHLORIDE 0.1 MG: 0.1 TABLET ORAL at 08:30

## 2024-02-16 RX ADMIN — TIZANIDINE 4 MG: 4 TABLET ORAL at 18:45

## 2024-02-16 RX ADMIN — FAMOTIDINE 20 MG: 20 TABLET, FILM COATED ORAL at 08:30

## 2024-02-16 RX ADMIN — SODIUM CHLORIDE 1000 MG: 900 INJECTION INTRAVENOUS at 20:23

## 2024-02-16 RX ADMIN — FAMOTIDINE 20 MG: 20 TABLET, FILM COATED ORAL at 20:22

## 2024-02-16 RX ADMIN — METFORMIN HCL 1000 MG: 500 TABLET ORAL at 17:23

## 2024-02-16 RX ADMIN — INSULIN LISPRO 2 UNITS: 100 INJECTION, SOLUTION INTRAVENOUS; SUBCUTANEOUS at 08:31

## 2024-02-16 RX ADMIN — HYDROCODONE BITARTRATE AND ACETAMINOPHEN 1 TABLET: 7.5; 325 TABLET ORAL at 08:18

## 2024-02-16 RX ADMIN — ROCURONIUM BROMIDE 30 MG: 10 INJECTION, SOLUTION INTRAVENOUS at 13:16

## 2024-02-16 RX ADMIN — Medication 100 MCG: at 12:55

## 2024-02-16 RX ADMIN — PROPOFOL 200 MG: 10 INJECTION, EMULSION INTRAVENOUS at 12:12

## 2024-02-16 RX ADMIN — Medication 10 ML: at 08:32

## 2024-02-16 RX ADMIN — Medication 100 MCG: at 12:57

## 2024-02-16 RX ADMIN — ROCURONIUM BROMIDE 50 MG: 10 INJECTION, SOLUTION INTRAVENOUS at 12:12

## 2024-02-16 RX ADMIN — SODIUM CHLORIDE, POTASSIUM CHLORIDE, SODIUM LACTATE AND CALCIUM CHLORIDE 100 ML/HR: 600; 310; 30; 20 INJECTION, SOLUTION INTRAVENOUS at 10:01

## 2024-02-16 RX ADMIN — Medication 3 ML: at 08:32

## 2024-02-16 RX ADMIN — ONDANSETRON 4 MG: 2 INJECTION INTRAMUSCULAR; INTRAVENOUS at 05:03

## 2024-02-16 RX ADMIN — ROCURONIUM BROMIDE 20 MG: 10 INJECTION, SOLUTION INTRAVENOUS at 13:00

## 2024-02-16 RX ADMIN — ONDANSETRON 4 MG: 2 INJECTION INTRAMUSCULAR; INTRAVENOUS at 14:26

## 2024-02-16 RX ADMIN — HYDROMORPHONE HYDROCHLORIDE 0.5 MG: 1 INJECTION, SOLUTION INTRAMUSCULAR; INTRAVENOUS; SUBCUTANEOUS at 15:08

## 2024-02-16 RX ADMIN — LOSARTAN POTASSIUM 25 MG: 50 TABLET, FILM COATED ORAL at 08:29

## 2024-02-16 RX ADMIN — ATORVASTATIN CALCIUM 20 MG: 10 TABLET, FILM COATED ORAL at 08:30

## 2024-02-16 RX ADMIN — OXYCODONE HYDROCHLORIDE 20 MG: 20 TABLET, FILM COATED, EXTENDED RELEASE ORAL at 10:00

## 2024-02-16 RX ADMIN — TIZANIDINE 4 MG: 4 TABLET ORAL at 08:31

## 2024-02-16 RX ADMIN — CLONIDINE HYDROCHLORIDE 0.1 MG: 0.1 TABLET ORAL at 20:22

## 2024-02-16 RX ADMIN — DROPERIDOL 0.62 MG: 2.5 INJECTION, SOLUTION INTRAMUSCULAR; INTRAVENOUS at 15:00

## 2024-02-16 RX ADMIN — GABAPENTIN 300 MG: 300 CAPSULE ORAL at 20:22

## 2024-02-16 RX ADMIN — SUGAMMADEX 200 MG: 100 INJECTION, SOLUTION INTRAVENOUS at 13:40

## 2024-02-16 RX ADMIN — SODIUM CHLORIDE 75 ML/HR: 9 INJECTION, SOLUTION INTRAVENOUS at 16:19

## 2024-02-16 RX ADMIN — FENTANYL CITRATE 25 MCG: 50 INJECTION, SOLUTION INTRAMUSCULAR; INTRAVENOUS at 15:05

## 2024-02-16 RX ADMIN — GABAPENTIN 300 MG: 300 CAPSULE ORAL at 08:18

## 2024-02-16 RX ADMIN — FENTANYL CITRATE 100 MCG: 0.05 INJECTION, SOLUTION INTRAMUSCULAR; INTRAVENOUS at 12:12

## 2024-02-16 RX ADMIN — ONDANSETRON 4 MG: 2 INJECTION INTRAMUSCULAR; INTRAVENOUS at 16:10

## 2024-02-16 RX ADMIN — HYDROMORPHONE HYDROCHLORIDE 0.5 MG: 1 INJECTION, SOLUTION INTRAMUSCULAR; INTRAVENOUS; SUBCUTANEOUS at 14:58

## 2024-02-16 NOTE — PROGRESS NOTES
Manuel Moscoso is a 63 y.o. male patient.      Anxiously awaiting surgery today.  Did very well with walking yesterday.  Pain well-controlled.  Plan discharge home either later today or tomorrow morning.      Current Facility-Administered Medications   Medication Dose Route Frequency Provider Last Rate Last Admin    acetaminophen (TYLENOL) tablet 650 mg  650 mg Oral Q6H PRN CHARITO Garcia MD        atorvastatin (LIPITOR) tablet 20 mg  20 mg Oral Daily CHARITO Garcia MD   20 mg at 02/15/24 0857    ceFAZolin 2000 mg IVPB in 100 mL NS (MBP)  2,000 mg Intravenous On Call to OR Michael Wallace PA-C        cloNIDine (CATAPRES) tablet 0.1 mg  0.1 mg Oral BID CHARITO Garcia MD   0.1 mg at 02/15/24 2105    dextrose (D50W) (25 g/50 mL) IV injection 25 g  25 g Intravenous Q15 Min PRN Adolph Chao MD        dextrose (GLUTOSE) oral gel 15 g  15 g Oral Q15 Min PRN Adolph Chao MD        diphenhydrAMINE (BENADRYL) capsule 25 mg  25 mg Oral Nightly PRN CHARITO Garcia MD        famotidine (PEPCID) injection 20 mg  20 mg Intravenous Q12H CHARITO Garcia MD   20 mg at 02/14/24 2030    Or    famotidine (PEPCID) tablet 20 mg  20 mg Oral Q12H CHARITO Garcia MD   20 mg at 02/15/24 2105    gabapentin (NEURONTIN) capsule 300 mg  300 mg Oral TID CHARITO Garcia MD   300 mg at 02/15/24 2105    glucagon (GLUCAGEN) injection 1 mg  1 mg Intramuscular Q15 Min PRN Adolph Chao MD        hydroCHLOROthiazide tablet 12.5 mg  12.5 mg Oral Daily CHARITO Garcia MD   12.5 mg at 02/15/24 0857    HYDROcodone-acetaminophen (NORCO) 7.5-325 MG per tablet 1 tablet  1 tablet Oral Q6H PRN CHARITO Garcia MD   1 tablet at 02/15/24 1743    HYDROmorphone (DILAUDID) injection 1 mg  1 mg Intravenous Q3H PRN CHARITO Garcia MD        Insulin Lispro (humaLOG) injection 2-7 Units  2-7 Units Subcutaneous 4x Daily AC & at Bedtime Adolph Chao MD   2 Units at 02/15/24 3564     "lactated ringers infusion  100 mL/hr Intravenous Continuous PRN CHARITO Garcia MD        lactated ringers infusion  9 mL/hr Intravenous Continuous CHARITO Garcia MD        losartan (COZAAR) tablet 25 mg  25 mg Oral Daily CHARITO Garcia MD   25 mg at 02/15/24 0856    metFORMIN (GLUCOPHAGE) tablet 1,000 mg  1,000 mg Oral BID AC CHARITO Garcia MD   1,000 mg at 02/15/24 1648    ondansetron ODT (ZOFRAN-ODT) disintegrating tablet 4 mg  4 mg Oral Q6H PRN CHARITO Garcia MD        Or    ondansetron (ZOFRAN) injection 4 mg  4 mg Intravenous Q6H PRN CHARITO Garcia MD   4 mg at 02/16/24 0503    ondansetron (ZOFRAN) injection 4 mg  4 mg Intravenous Q6H PRN CHARITO Garcia MD        polyethylene glycol (MIRALAX) packet 17 g  17 g Oral BID dAolph Chao MD   17 g at 02/15/24 2105    sodium chloride 0.9 % flush 10 mL  10 mL Intravenous PRN CHARITO Garcia MD        sodium chloride 0.9 % flush 3 mL  3 mL Intravenous Q12H CHARITO Garcia MD   3 mL at 02/15/24 2105    sodium chloride 0.9 % infusion 40 mL  40 mL Intravenous PRN CHARITO Garcia MD        sodium chloride 0.9 % infusion  75 mL/hr Intravenous Continuous CHARITO Garcia MD 75 mL/hr at 02/14/24 1737 75 mL/hr at 02/14/24 1737    tiZANidine (ZANAFLEX) tablet 4 mg  4 mg Oral Q8H PRN CHARITO Garcia MD         ALLERGIES:    Allergies   Allergen Reactions    Alpha-Gal Nausea And Vomiting and GI Intolerance     Red meat allergy    Pork-Derived Products Nausea And Vomiting and GI Intolerance    Penicillins Unknown - Low Severity     STATES HE \"HAS NO IDEA\" AND THAT IT WAS A CHILDHOOD ALLERGY       Lumbar stenosis    Lumbosacral disc disease    Type 2 diabetes mellitus with hyperglycemia, without long-term current use of insulin    Essential hypertension    Drug-induced constipation    Blood pressure 157/88, pulse 72, temperature 98.9 °F (37.2 °C), temperature source Oral, resp. rate 18, height 174.8 cm (68.82\"), weight " "105 kg (230 lb 13.2 oz), SpO2 97%.      Subjective:  Symptoms:  Stable.    Diet:  NPO.    Activity level: Impaired due to pain.    Pain:  He complains of pain that is moderate.  He reports pain is unchanged.  Pain is well controlled.      Review of Systems  Objective:  General Appearance:  Comfortable and well-appearing.    Vital signs: (most recent): Blood pressure 157/88, pulse 72, temperature 98.9 °F (37.2 °C), temperature source Oral, resp. rate 18, height 174.8 cm (68.82\"), weight 105 kg (230 lb 13.2 oz), SpO2 97%.  Vital signs are normal.    Output: Producing urine and minimal stool output.    HEENT: Normal HEENT exam.    Lungs:  Normal effort and normal respiratory rate.    Heart: Normal rate.  Regular rhythm.    Abdomen: Abdomen is soft.  Hypoactive bowel sounds.   There is generalized tenderness.     Extremities: Decreased range of motion.    Neurological: Patient is alert.    Skin:  Warm and dry.                Labs:  Lab Results (last 72 hours)       Procedure Component Value Units Date/Time    Iron Profile [[id number]]  (Normal) Collected: 02/16/24 0453    Specimen: Blood Updated: 02/16/24 0541     Iron 73 mcg/dL      Iron Saturation (TSAT) 24 %      Transferrin 206 mg/dL      TIBC 307 mcg/dL     Vitamin B12 [[id number]] Collected: 02/16/24 0453    Specimen: Blood Updated: 02/16/24 0515    Folate [[id number]] Collected: 02/16/24 0453    Specimen: Blood Updated: 02/16/24 0515    POC Glucose Once [[id number]]  (Abnormal) Collected: 02/15/24 2003    Specimen: Blood Updated: 02/15/24 2015     Glucose 175 mg/dL      Comment: : [id number] Tamie TannerMeter ID: [id number]       POC Glucose Once [[id number]]  (Abnormal) Collected: 02/15/24 1637    Specimen: Blood Updated: 02/15/24 1649     Glucose 140 mg/dL      Comment: : [id number] Lacho LouevaMeter ID: [id number]       POC Glucose Once [[id number]]  (Normal) Collected: 02/15/24 1100    Specimen: Blood Updated: 02/15/24 1111     Glucose 109 mg/dL      " Comment: : [id number] James GongoraineMeter ID: [id number]       POC Glucose Once [[id number]]  (Normal) Collected: 02/15/24 0724    Specimen: Blood Updated: 02/15/24 0735     Glucose 105 mg/dL      Comment: : [id number] James GongoraineMeter ID: [id number]       Basic Metabolic Panel [[id number]]  (Abnormal) Collected: 02/15/24 0336    Specimen: Blood Updated: 02/15/24 0457     Glucose 163 mg/dL      BUN 16 mg/dL      Creatinine 1.02 mg/dL      Sodium 137 mmol/L      Potassium 4.4 mmol/L      Comment: Slight hemolysis detected by analyzer. Result may be falsely elevated.        Chloride 103 mmol/L      CO2 22.0 mmol/L      Calcium 9.0 mg/dL      BUN/Creatinine Ratio 15.7     Anion Gap 12.0 mmol/L      eGFR 83.1 mL/min/1.73     Narrative:      GFR Normal >60  Chronic Kidney Disease <60  Kidney Failure <15      CBC & Differential [[id number]]  (Abnormal) Collected: 02/15/24 0336    Specimen: Blood Updated: 02/15/24 0429    Narrative:      The following orders were created for panel order CBC & Differential.  Procedure                               Abnormality         Status                     ---------                               -----------         ------                     CBC Auto Differential[[id number]]        Abnormal            Final result                 Please view results for these tests on the individual orders.    CBC Auto Differential [[id number]]  (Abnormal) Collected: 02/15/24 0336    Specimen: Blood Updated: 02/15/24 0429     WBC 12.29 10*3/mm3      RBC 4.44 10*6/mm3      Hemoglobin 14.0 g/dL      Hematocrit 40.9 %      MCV 92.1 fL      MCH 31.5 pg      MCHC 34.2 g/dL      RDW 12.5 %      RDW-SD 42.2 fl      MPV 11.5 fL      Platelets 213 10*3/mm3      Neutrophil % 82.3 %      Lymphocyte % 10.0 %      Monocyte % 7.2 %      Eosinophil % 0.0 %      Basophil % 0.2 %      Immature Grans % 0.3 %      Neutrophils, Absolute 10.11 10*3/mm3      Lymphocytes, Absolute 1.23 10*3/mm3      Monocytes, Absolute  0.89 10*3/mm3      Eosinophils, Absolute 0.00 10*3/mm3      Basophils, Absolute 0.02 10*3/mm3      Immature Grans, Absolute 0.04 10*3/mm3      nRBC 0.0 /100 WBC     POC Glucose Once [[id number]]  (Normal) Collected: 02/14/24 1451    Specimen: Blood Updated: 02/14/24 1504     Glucose 119 mg/dL      Comment: : [id number] Andrew UmañalynnMeter ID: [id number]       POC Glucose Once [[id number]]  (Abnormal) Collected: 02/14/24 0931    Specimen: Blood Updated: 02/14/24 0943     Glucose 133 mg/dL      Comment: : [id number] Alex CastanoenMeter ID: [id number]               Imaging Results (Last 72 Hours)       Procedure Component Value Units Date/Time    XR Spine Lumbar AP & Lateral [[id number]] Collected: 02/14/24 1437     Updated: 02/14/24 1440    Narrative:      XR SPINE LUMBAR AP AND LATERAL- 2/14/2024 11:40 AM     HISTORY: llif     COMPARISON: None     FLUOROSCOPY TIME: 0.7-minute     FLUOROSCOPY DOSE: 39.6 mGy     NUMBER OF IMAGES: 6       Impression:         Intraoperative fluoroscopic images during lumbar fusion.     Please refer to the operative note for more details.     This report was signed and finalized on 2/14/2024 2:37 PM by Dr. Demetris Lomas MD.       FL C Arm During Surgery [[id number]] Resulted: 02/14/24 1421     Updated: 02/14/24 1421    Narrative:      This procedure was auto-finalized with no dictation required.                  Assessment:    Condition: In stable condition.  Improving.       Plan:   Transfer Plan: Transferred to operating room today for second step.  NPO.   (    Plan second step of surgery today.  Patient can be discharged either later this afternoon or tomorrow morning depending how he does with surgery.  Close follow-up with his primary care next week for TCM/hospital follow-up.    Type 2 diabetes-Glucotrol on hold due to n.p.o. for surgical intervention.  Patient on Accu-Cheks sliding scale insulin.  Okay to restart his home diabetic meds upon discharge with close  follow-up with his primary.      Constipation/obstipation-continue with stool softeners after his second step of surgery can stimulate with milk of mag or magnesium citrate if needed.      Patient is medically stable for surgical intervention today with expected discharge either after surgery today or tomorrow morning.    Explained to patient and staff that we were consulted for medical management during their acute care hospitalization. The Medical Consultation was requested by the Attending Physician. The patient has been recommended to f/u with their regular primary care provider concerning any further treatment and review of abnormalities found during their hospitalization at [facility]. They agree with the treatment plan as well as understand our role in their hospitalization. All questions were encouraged and answered to the best of my ability. ).     Problem List:     Lumbar stenosis    Lumbosacral disc disease    Type 2 diabetes mellitus with hyperglycemia, without long-term current use of insulin    Essential hypertension    Drug-induced constipation    Adolph Chao MD  2/16/2024

## 2024-02-16 NOTE — OP NOTE
LUMBAR LAMINECTOMY POSTERIOR LUMBAR INTERBODY FUSION  Procedure Note    Manuel Moscoso  2/16/2024    Pre-op Diagnosis:     1. Status post left L4-5 microdiskectomy, 11/18/2019   2. Status post revision left L4-5 microdiskectomy, insertion annular closure device, 11/01/2021   3. Status post anterior decompression, correction with instrumentation L5-S1, 12/19/2022   4. Status post left LLIF with instrumentation L4-5, 12/21/2022   5. Status post PSF with instrumentation L4-S1, 12/23/2022  6. Persistent chronic low back pain   7. Bilateral lower extremity weakness   8. Persistent left lower extremity numbness, tingling   9. Adjacent level degenerative disc disease L2-4   10. Facet arthropathy L2-4   11. Retrolisthesis L3-4   12. Chronic central disc herniation L2-3, L3-4   13. Central and foraminal stenosis L2-4   14. History of diabetic peripheral neuropathy   15. History of alpha gal syndrome after tick bite  16.  Status post right LLIF L2-4 with instrumentation L2-3, 2/14/2024    Post-op Diagnosis:    same    Procedure/CPT® Codes:    1.  Partial removal of posterior instrumentation L4-S1  2.  Exploration of fusion L4-S1  3.  Posterior spinal fusion L2-3, L3-4  4.  Posterior spinal instrumentation L2 to S1 (ATEC pedicle screws and rods)  5.  Use of locally obtained autograft bone for fusion L2 to 4  6.  Use of fluoroscopy for confirmation of surgical level and placement of instrumentation  7.  Intraoperative neural monitoring with pedicle screw stimulation     Anesthesia: General     Surgeon: CASSIE Garcia MD     Assistant: Bhargav Nolen PA-C     Estimated Blood Loss: 50 mL     Complications: None     Condition: Stable to PACU.     Indications:     The patient is a 63-year-old who initially underwent a left L4-5 microdiscectomy on 11/18/2019 followed by revision microdiscectomy on 11/1/2021. He then underwent a staged fusion procedure from L4-S1 in December 2022. Unfortunately he returned to the office with  persistent chronic low back pain along with bilateral lower extremity weakness and persistent left lower extremity numbness and tingling. Repeat imaging studies revealed adjacent level degenerative disc disease and facet arthropathy with chronic central disc herniations at both L2-3 and L3-4 causing central and foraminal stenosis. Of note, the patient did have a history of diabetic peripheral neuropathy as well as alpha gal syndrome after a tick bite.      After failing all conservative measures, it was mutually decided that surgery would be the best option. Risks, benefits, and complications of surgery were discussed with the patient. The patient appeared well informed and wished to proceed. We specifically discussed the risk of infection, blood loss, nerve root injury, CSF leak, and the possibility of incomplete resolution of symptoms.  We also discussed the risk of a nonunion and the potential need for additional surgery in the event of a pseudoarthrosis or hardware failure.     We elected to proceed with surgery in a staged fashion.  Previously on 2/14/2024, the patient underwent a right lateral fusion from L2 to 4 with instrumentation at L2-3.  Today we return to surgery for the second posterior stage of the procedure.     Operative Procedure:     After obtaining informed consent and verifying the correct operative site, the patient was brought to the operating room. A general anesthetic was provided by the anesthesia service with the assistance of an endotracheal tube. Once this was appropriately positioned and secured, the patient was carefully rotated prone onto a Christopher frame. All bony processes were well-padded. The lumbar region was prepped and draped in usual sterile fashion. A surgical timeout was taken to confirm this was the correct patient, we were working at the correct levels, and that preoperative antibiotics were given in a timely fashion.     The previous incisions overlying the instrumentation  from L4-S1 were reopened using a 10 blade scalpel.  Dissection was carried through subcutaneous tissues using Bovie cautery. The fascia was divided in line with the incision. Blunt dissection was carried between the multifidus and longissimus muscles down to the previously placed instrumentation spanning L4-S1. There was some scar tissue as expected around the screws and between the musculature but the dissection was very smooth with very little blood loss.  The previous instrumentation was completely exposed using Bovie cautery removing some fibrous scar tissue from around the screw heads themselves. The appropriate screwdriver and antitorque wrench were used to remove the setscrews. The rods were then taken out with a richard butler. The area was then thoroughly explored bilaterally.  All of the pedicle screws appeared to be relatively tight with no evidence of loosening.  There appeared to be adequate bone graft in the posterior lateral gutters consistent with a solid fusion from L4 to S1.  All of the screws appeared appropriate for reuse with the same instrumentation set.     The left sided Wiltsey incision was then extended using the 10 blade scalpel up to L2.  The same intramuscular plane was used to gain access to the facet joints of L2-3 and L3-4 and the transverse processes of L2 and L3.  Self retaining retractors were placed for continuous exposure.  Bovie cautery was used to expose as much bony surface area on the transverse processes and to destroy the facet capsules at both L2-3 and L3-4.  The wound was then thoroughly irrigated with saline solution.     Next under fluoroscopic guidance, I created starting holes for the placement of new pedicle screw instrumentation at L2 and L3.  A starting hole was created with a high-speed bur and the pedicle track was then created using a pedicle probe gaining access through the pedicle to the anterior vertebral body.  The pedicle tract was palpated with a ball-tipped  amelie to ensure that the track was adequate.  Into the pedicles of L2 and L3 on the left, I placed new screws measuring 6.5 mm x 45 mm from the [facility] instrumentation set.       Next, I turned my attention to the right side of the spine.  I was able to stretch the right sided Wiltsey incision superiorly and using a Jamshidi needle to cannulate the pedicle of L2 on the right.  Through this, a guidewire was placed to maintain position.  I then placed a 6.5 mm x 45 mm screw again from the [facility] instrumentation set into the right pedicle of L2.  Fluoroscopy was used to confirm that all instrumentation was properly positioned in both the AP and lateral projections.  The wounds were then copiously irrigated with saline solution.  I used a neuro monitoring probe to stimulate the screws to ensure that there were adequately positioned.  All screws appeared well positioned.     The high-speed bur was used to decorticate the posterior elements of L2 and L3 as well as the previous fusion mass on the left side from L4 to S1.  I also decorticated the facet joints of L2-3 and L3-4.  The local bone was left in situ to assist with obtaining a fusion.  This constituted a posterior fusion of L2-3 and L3-4.     Appropriate rods were then chosen to span the pedicle screw instrumentation now spanning L2 to S1 bilaterally.  The rods were bent to accommodate the patient's increased lordosis.  Set screws were inserted into the pedicle screw saddles fixing the rods into position.  The setscrews were then tightened using the appropriate antitorque wrench and torque limiting screwdriver provided by [facility].     After insuring that we had adequate hemostasis, closure was then undertaken using a #1 Vicryl to reapproximate the fascia. Immediate subcutaneous tissues were closed with a 2-0 Vicryl and skin closure was then accomplished using Mastisol and Steri-Strips.  I did infuse the subcutaneous layer with half percent Marcaine to assist with  postoperative pain control.  The wounds were then washed and sterilely dressed. The patient was then carefully rotated supine onto a hospital gurney, extubated, and sent to the recovery room in good stable condition. The patient tolerated the procedure well, there were no complications.  Estimated blood loss was approximately 50 mL.    Intraoperative neuro monitoring was ordered and carried out throughout the procedure to add an increased level of safety for the patient. The interpreting physician was available by means of real-time continuous, bidirectional, remote audio and visual communication as needed throughout the entire procedure. Modalities used during the procedure included SSEP, EMG, TOF, and pedicle screw stimulation. There were no neuro monitoring signal changes during the procedure.      Bhargav Nolen PA-C provided critical assistance during the procedure.  His assistance was medically necessary in order to allow the procedure to occur in the most safe and efficient manner.  He assisted not only with the partial removal of instrumentation and exploration of fusion from L4 to S1, but also assisted with the placement of new instrumentation and bone graft spanning the whole construct from L2 to S1.    CASSIE Garcia MD     Date: 2/16/2024  Time: 14:20 CST

## 2024-02-16 NOTE — PLAN OF CARE
Problem: Adult Inpatient Plan of Care  Goal: Plan of Care Review  Outcome: Ongoing, Progressing  Flowsheets (Taken 2/16/2024 5437)  Progress: no change  Plan of Care Reviewed With: patient  Outcome Evaluation: Pt is A&Ox4. Dressing to right flank is CDI. Upx1 with walker to BR. voiding. VSS. Safety maintained.

## 2024-02-17 VITALS
OXYGEN SATURATION: 100 % | TEMPERATURE: 98.1 F | SYSTOLIC BLOOD PRESSURE: 130 MMHG | HEART RATE: 84 BPM | DIASTOLIC BLOOD PRESSURE: 72 MMHG | HEIGHT: 69 IN | BODY MASS INDEX: 34.19 KG/M2 | RESPIRATION RATE: 18 BRPM | WEIGHT: 230.82 LBS

## 2024-02-17 LAB
GLUCOSE BLDC GLUCOMTR-MCNC: 142 MG/DL (ref 70–130)
GLUCOSE BLDC GLUCOMTR-MCNC: 174 MG/DL (ref 70–130)

## 2024-02-17 PROCEDURE — 97168 OT RE-EVAL EST PLAN CARE: CPT | Performed by: OCCUPATIONAL THERAPIST

## 2024-02-17 PROCEDURE — 63710000001 INSULIN LISPRO (HUMAN) PER 5 UNITS: Performed by: PHYSICIAN ASSISTANT

## 2024-02-17 PROCEDURE — 97116 GAIT TRAINING THERAPY: CPT

## 2024-02-17 PROCEDURE — 25010000002 CEFAZOLIN PER 500 MG: Performed by: PHYSICIAN ASSISTANT

## 2024-02-17 PROCEDURE — 25810000003 SODIUM CHLORIDE 0.9 % SOLUTION: Performed by: PHYSICIAN ASSISTANT

## 2024-02-17 PROCEDURE — 97164 PT RE-EVAL EST PLAN CARE: CPT

## 2024-02-17 PROCEDURE — 97535 SELF CARE MNGMENT TRAINING: CPT | Performed by: OCCUPATIONAL THERAPIST

## 2024-02-17 PROCEDURE — 82948 REAGENT STRIP/BLOOD GLUCOSE: CPT

## 2024-02-17 RX ORDER — HYDROCODONE BITARTRATE AND ACETAMINOPHEN 7.5; 325 MG/1; MG/1
1 TABLET ORAL EVERY 6 HOURS PRN
Qty: 28 TABLET | Refills: 0 | Status: SHIPPED | OUTPATIENT
Start: 2024-02-17 | End: 2024-02-24

## 2024-02-17 RX ADMIN — CLONIDINE HYDROCHLORIDE 0.1 MG: 0.1 TABLET ORAL at 08:10

## 2024-02-17 RX ADMIN — GABAPENTIN 300 MG: 300 CAPSULE ORAL at 08:10

## 2024-02-17 RX ADMIN — SODIUM CHLORIDE 75 ML/HR: 9 INJECTION, SOLUTION INTRAVENOUS at 03:59

## 2024-02-17 RX ADMIN — METFORMIN HCL 1000 MG: 500 TABLET ORAL at 08:11

## 2024-02-17 RX ADMIN — SODIUM CHLORIDE 1000 MG: 900 INJECTION INTRAVENOUS at 03:59

## 2024-02-17 RX ADMIN — Medication 3 ML: at 08:11

## 2024-02-17 RX ADMIN — ATORVASTATIN CALCIUM 20 MG: 10 TABLET, FILM COATED ORAL at 08:10

## 2024-02-17 RX ADMIN — LOSARTAN POTASSIUM 25 MG: 50 TABLET, FILM COATED ORAL at 08:11

## 2024-02-17 RX ADMIN — FAMOTIDINE 20 MG: 20 TABLET, FILM COATED ORAL at 08:11

## 2024-02-17 RX ADMIN — INSULIN LISPRO 2 UNITS: 100 INJECTION, SOLUTION INTRAVENOUS; SUBCUTANEOUS at 12:00

## 2024-02-17 RX ADMIN — HYDROCHLOROTHIAZIDE 12.5 MG: 25 TABLET ORAL at 08:11

## 2024-02-17 RX ADMIN — SODIUM CHLORIDE 1000 MG: 900 INJECTION INTRAVENOUS at 12:05

## 2024-02-17 RX ADMIN — POLYETHYLENE GLYCOL 3350 17 G: 17 POWDER, FOR SOLUTION ORAL at 08:10

## 2024-02-17 RX ADMIN — HYDROCODONE BITARTRATE AND ACETAMINOPHEN 1 TABLET: 7.5; 325 TABLET ORAL at 04:55

## 2024-02-17 NOTE — THERAPY DISCHARGE NOTE
Acute Care - Occupational Therapy Discharge  [facility]    Patient Name: Manuel Moscoso  : 1960    MRN: [id number]                              Today's Date: 2024       Admit Date: 2024    Visit Dx:     ICD-10-CM ICD-9-CM   1. Impaired mobility [Z74.09]  Z74.09 799.89     Patient Active Problem List   Diagnosis    Lumbosacral disc disease    Chronic bilateral low back pain without sciatica    Lumbago of multiple sites in spine with sciatica    Type 2 diabetes mellitus with hyperglycemia, without long-term current use of insulin    Essential hypertension    Drug-induced constipation    Lumbar stenosis     Past Medical History:   Diagnosis Date    Allergy to alpha-gal     Arthritis     Chronic bilateral low back pain without sciatica 2022    Diabetes mellitus     Hearing loss, left     History of staph infection     Hypertension     Low back pain     Lumbosacral disc disease 2022    Psoriasis     Sleep apnea     Tinnitus     Vision disturbance     using rx eyedrops     Past Surgical History:   Procedure Laterality Date    ANTERIOR LUMBAR EXPOSURE N/A 2022    Procedure: ANTERIOR LUMBAR EXPOSURE;  Surgeon: Simba Dyson DO;  Location: [facility] OR;  Service: Vascular;  Laterality: N/A;    BACK SURGERY      x 2    CHOLECYSTECTOMY      COLONOSCOPY      GANGLION CYST EXCISION Left     wrist    LIPOMA EXCISION      back    LUMBAR FUSION Left 2022    Procedure: LEFT LATERAL LUMBAR INTERBODY FUSION WITH INSTRUMENTATION L4-5;  Surgeon: CHARITO Garcia MD;  Location: [facility] OR;  Service: Orthopedic Spine;  Laterality: Left;    LUMBAR FUSION N/A 2022    Procedure: ANTERIOR DECOMPRESSION, ANTERIOR LUMBAR INTERBODY FUSION WITH INSTRUMENTATION L5-S1;  Surgeon: CHARITO Garcia MD;  Location: [facility] OR;  Service: Orthopedic Spine;  Laterality: N/A;    LUMBAR LAMINECTOMY WITH FUSION N/A 2022    Procedure: POSTERIOR SPINAL FUSION WITH INSTRUMENTATION L4-S1;  Surgeon:  CHARITO Garcia MD;  Location: [facility];  Service: Orthopedic Spine;  Laterality: N/A;      General Information       Row Name 02/17/24 0754          OT Time and Intention    Document Type re-evaluation  -JJ (r) KG (t) JJ (c)     Mode of Treatment occupational therapy  -JJ (r) KG (t) JJ (c)       Row Name 02/17/24 0754          General Information    Patient Profile Reviewed yes  Pt presents s/p pt 2/2 post. lumbar laminectomy/ post lumbar interbody fusion L 2-4 with inst L2-S1  -JJ (r) KG (t) JJ (c)     Existing Precautions/Restrictions fall;LSO;spinal;brace worn when out of bed  -JJ (r) KG (t) JJ (c)     Barriers to Rehab physical barrier  -JJ (r) KG (t) JJ (c)       Row Name 02/17/24 0754          Cognition    Orientation Status (Cognition) oriented x 4  -JJ (r) KG (t) JJ (c)       Row Name 02/17/24 [facility]          Safety Issues, Functional Mobility    Impairments Affecting Function (Mobility) sensation/sensory awareness;pain;range of motion (ROM)  -JJ (r) KG (t) JJ (c)               User Key  (r) = Recorded By, (t) = Taken By, (c) = Cosigned By      Initials Name Provider Type    Steffi Oliver, OTR/L, CSRS Occupational Therapist    KG Adolfo Rodriguez, OT Student OT Student                   Mobility/ADL's       Row Name 02/17/24 0754          Bed Mobility    Comment, (Bed Mobility) Pt sitting EOB at therapist arrival  -JJ (r) KG (t) JJ (c)       Row Name 02/17/24 0754          Transfers    Transfers sit-stand transfer;stand-sit transfer  -JJ (r) KG (t) JJ (c)       Row Name 02/17/24 0754          Sit-Stand Transfer    Sit-Stand [place] (Transfers) contact guard  -JJ (r) KG (t) JJ (c)     Assistive Device (Sit-Stand Transfers) walker, front-wheeled  -JJ (r) KG (t) JJ (c)       Row Name 02/17/24 0754          Stand-Sit Transfer    Stand-Sit [place] (Transfers) contact guard  -JJ (r) KG (t) JJ (c)     Assistive Device (Stand-Sit Transfers) walker, front-wheeled  -JJ (r) KG (t) JJ (c)       Row  Name 02/17/24 [facility]          Functional Mobility    Functional Mobility- Ind. Level standby assist  -JJ (r) KG (t) JJ (c)     Functional Mobility- Device walker, front-wheeled  -JJ (r) KG (t) JJ (c)     Functional Mobility- Comment Chair<>rockwell  -JJ (r) KG (t) JJ (c)     Patient was able to Ambulate yes  -JJ (r) KG (t) JJ (c)       Row Name 02/17/24 [facility]          Activities of Daily Living    BADL Assessment/Intervention lower body dressing;feeding;grooming  -JJ (r) KG (t) JJ (c)       Row Name 02/17/24 [facility]          Lower Body Dressing Assessment/Training    [place] Level (Lower Body Dressing) doff;don;pants/bottoms;moderate assist (50% patient effort)  -JJ (r) KG (t) JJ (c)     Position (Lower Body Dressing) edge of bed sitting  -JJ (r) KG (t) JJ (c)       Row Name 02/17/24 [facility]          Self-Feeding Assessment/Training    [place] Level (Feeding) liquids to mouth;independent  -JJ (r) KG (t) JJ (c)     Position (Self-Feeding) edge of bed sitting;supported sitting  -JJ (r) KG (t) JJ (c)       Row Name 02/17/24 [facility]          Grooming Assessment/Training    [place] Level (Grooming) wash face, hands;independent  -JJ (r) KG (t) JJ (c)     Position (Grooming) edge of bed sitting  -JJ (r) KG (t) JJ (c)               User Key  (r) = Recorded By, (t) = Taken By, (c) = Cosigned By      Initials Name Provider Type    Steffi Oliver, OTR/L, CSRS Occupational Therapist    KG Adolfo Rodriguez OT Student OT Student                   Obj/Interventions       Row Name 02/17/24 [facility]          Sensory Assessment (Somatosensory)    Sensory Assessment (Somatosensory) UE sensation intact  -JJ (r) KG (t) JJ (c)     Sensory Assessment BUE sensation intact; pt reports LLE impaired greater than R d/t hx of neuropathy prior to this admit  -JJ (r) KG (t) JJ (c)       Row Name 02/17/24 [facility]          Vision Assessment/Intervention    Visual Impairment/Limitations corrective lenses full-time  -KASSIE (r) TONJA (t) KASSIE Montielc)       Richar  Name 02/17/24 0754          Range of Motion Comprehensive    Comment, General Range of Motion BUE WFL; B hip flex imapired in witting, WFL while standing  -JJ (r) KG (t) JJ (c)       Row Name 02/17/24 0754          Strength Comprehensive (MMT)    Comment, General Manual Muscle Testing (MMT) Assessment BUE 5/5; hip felx not tested d/t pt c/o pain all other LLE 4/5, all other RLE 5/5  -JJ (r) KG (t) JJ (c)       Row Name 02/17/24 0754          Balance    Balance Assessment sitting static balance;sitting dynamic balance;standing static balance;standing dynamic balance  -JJ (r) KG (t) JJ (c)     Static Sitting Balance independent  -JJ (r) KG (t) JJ (c)     Dynamic Sitting Balance independent  -JJ (r) KG (t) JJ (c)     Position, Sitting Balance unsupported;sitting edge of bed;supported;sitting in chair  -JJ (r) KG (t) JJ (c)     Static Standing Balance standby assist  -JJ (r) KG (t) JJ (c)     Dynamic Standing Balance standby assist  -JJ (r) KG (t) JJ (c)     Position/Device Used, Standing Balance walker, front-wheeled  -JJ (r) KG (t) JJ (c)               User Key  (r) = Recorded By, (t) = Taken By, (c) = Cosigned By      Initials Name Provider Type    Steffi Oliver, OTR/L, CSRS Occupational Therapist    Adolfo Crisostomo OT Student OT Student                   Goals/Plan    No documentation.                  Clinical Impression       Row Name 02/17/24 0754          Pain Assessment    Pretreatment Pain Rating 4/10  -JJ (r) KG (t) JJ (c)     Posttreatment Pain Rating 5/10  -JJ (r) KG (t) JJ (c)     Pain Location - back  -JJ (r) KG (t) JJ (c)     Pre/Posttreatment Pain Comment Pt also c/o of pain while seated EOB heaving as if to vomit, however, no emesis occurred. Nausea resolved per pt report with movement  -JJ (r) KG (t) JJ (c)     Pain Intervention(s) Medication (See MAR);Ambulation/increased activity;Repositioned;Nursing Notified  -KASSIE (r) KG (t) KASSIE (c)       Row Name 02/17/24 0754          Plan of Care  "Review    Plan of Care Reviewed With patient  -JTRISHA (r) KG (t) JTRISHA (c)     Outcome Evaluation OT reeval complete. Pt presented seated EOB wearing LSO with c/o 4/5 pain. He was able to recall and recite spinal precautions with no vcs req. At pt request, he donned undergarments with modA d/t decreased ROM and pain from sx. Pt participated in MMT, ROM, and sensation screening with deficits in BLE ROM, strength, and sensation. Sensation impairments noted as present prior to this admit d/t pt reported neuropathy. Pt attempted to vomit while seated EOB, however, no emesis occurred at this time. Pt's BP was noted to be elevated with systolic >200 at this time; nsg notified. Pt tf to chair with CGA and rwx and BLE were elevated. Pt reports that while \"dry heaving\" his BP usually rises substantially. He remained seated in chair with BLE elevated for approx 10 minutes and BP was decreased to 176/88. He noted that this is his usual BP and he was educated on average BP stats. Pt requested to cont with therapy and asked to amb down [place]. PT came into standing with CGA and amb down [place] and back to chair with standby assist and rwx. Pt was seated in chair and nsg notified of pt BP during eval. Pt encouraged to call nsg for tf to bed or BR. Pt did not demonstrate deficits or impairments that require skilled OT services at this time. OT to sign off and defer cont bal and mobility training to PT. Pt would benefit from assist at home at [place].  -JJ (r) KG (t) KASSIE (c)       Row Name 02/17/24 0754          Therapy Assessment/Plan (OT)    Criteria for Skilled Therapeutic Interventions Met (OT) no problems identified which require skilled intervention;no  -JJ (r) KG (t) JJ (c)     Therapy Frequency (OT) evaluation only  -JJ (r) KG (t) JJ (c)       Row Name 02/17/24 0755          Therapy Plan Review/Discharge Plan (OT)    Anticipated Discharge Disposition (OT) home with assist  -JJ (r) KG (t) JJ (c)       Row Name 02/17/24 0754          " Positioning and Restraints    Pre-Treatment Position in bed  -JJ (r) KG (t) JJ (c)     Post Treatment Position chair  -JJ (r) KG (t) JJ (c)     In Chair notified nsg;sitting;call light within reach;encouraged to call for assist;legs elevated  -JJ (r) KG (t) JJ (c)               User Key  (r) = Recorded By, (t) = Taken By, (c) = Cosigned By      Initials Name Provider Type    Steffi Oliver OTR/L, CSRS Occupational Therapist    Adolfo Crisostomo, OT Student OT Student                   Outcome Measures       Row Name 02/17/24 0754          How much help from another is currently needed...    Putting on and taking off regular lower body clothing? 3  -JJ (r) KG (t) JJ (c)     Bathing (including washing, rinsing, and drying) 3  -JJ (r) KG (t) JJ (c)     Toileting (which includes using toilet bed pan or urinal) 3  -JJ (r) KG (t) JJ (c)     Putting on and taking off regular upper body clothing 4  -JJ (r) KG (t) JJ (c)     Taking care of personal grooming (such as brushing teeth) 4  -JJ (r) KG (t) JJ (c)     Eating meals 4  -JJ (r) KG (t) JJ (c)     AM-PAC 6 Clicks Score (OT) 21  -JJ (r) KG (t)       Row Name 02/17/24 0754          Functional Assessment    Outcome Measure Options AM-PAC 6 Clicks Daily Activity (OT)  -JJ (r) KG (t) JJ (c)               User Key  (r) = Recorded By, (t) = Taken By, (c) = Cosigned By      Initials Name Provider Type    Steffi Oliver OTR/L, CSRS Occupational Therapist    Adolfo Crisostomo, OT Student OT Student                  Occupational Therapy Education       Title: PT OT SLP Therapies (In Progress)       Topic: Occupational Therapy (In Progress)       Point: ADL training (Done)       Description:   Instruct learner(s) on proper safety adaptation and remediation techniques during self care or transfers.   Instruct in proper use of assistive devices.                  Learning Progress Summary             Patient Acceptance, E, VU by TONJA at 2/17/2024 0754    Acceptance, E,  VU by  at 2/15/2024 0847    Comment: Role of OT, spinal precautions, brace wear/care, incentive spirometry, benefits of ambulation, OT POC                         Point: Home exercise program (Not Started)       Description:   Instruct learner(s) on appropriate technique for monitoring, assisting and/or progressing therapeutic exercises/activities.                  Learner Progress:  Not documented in this visit.              Point: Precautions (Done)       Description:   Instruct learner(s) on prescribed precautions during self-care and functional transfers.                  Learning Progress Summary             Patient Acceptance, E, VU by KG at 2/17/2024 0754    Acceptance, E, VU by  at 2/15/2024 0847    Comment: Role of OT, spinal precautions, brace wear/care, incentive spirometry, benefits of ambulation, OT POC                         Point: Body mechanics (Done)       Description:   Instruct learner(s) on proper positioning and spine alignment during self-care, functional mobility activities and/or exercises.                  Learning Progress Summary             Patient Acceptance, E, VU by KG at 2/17/2024 0754    Acceptance, E, VU by  at 2/15/2024 0847    Comment: Role of OT, spinal precautions, brace wear/care, incentive spirometry, benefits of ambulation, OT POC                                         User Key       Initials Effective Dates Name Provider Type Discipline     01/05/24 -  Adolfo Rodriguez OT Student OT Student OT     01/09/24 -  Ml Barba OT Student OT Student OT                  OT Recommendation and Plan  Therapy Frequency (OT): evaluation only  Plan of Care Review  Plan of Care Reviewed With: patient  Outcome Evaluation: OT reeval complete. Pt presented seated EOB wearing LSO with c/o 4/5 pain. He was able to recall and recite spinal precautions with no vcs req. At pt request, he donned undergarments with modA d/t decreased ROM and pain from sx. Pt participated in MMT, ROM,  "and sensation screening with deficits in BLE ROM, strength, and sensation. Sensation impairments noted as present prior to this admit d/t pt reported neuropathy. Pt attempted to vomit while seated EOB, however, no emesis occurred at this time. Pt's BP was noted to be elevated with systolic >200 at this time; nsg notified. Pt tf to chair with CGA and rwx and BLE were elevated. Pt reports that while \"dry heaving\" his BP usually rises substantially. He remained seated in chair with BLE elevated for approx 10 minutes and BP was decreased to 176/88. He noted that this is his usual BP and he was educated on average BP stats. Pt requested to cont with therapy and asked to amb down [place]. PT came into standing with CGA and amb down [place] and back to chair with standby assist and rwx. Pt was seated in chair and nsg notified of pt BP during eval. Pt encouraged to call nsg for tf to bed or BR. Pt did not demonstrate deficits or impairments that require skilled OT services at this time. OT to sign off and defer cont bal and mobility training to PT. Pt would benefit from assist at home at [place].  Plan of Care Reviewed With: patient  Outcome Evaluation: OT reeval complete. Pt presented seated EOB wearing LSO with c/o 4/5 pain. He was able to recall and recite spinal precautions with no vcs req. At pt request, he donned undergarments with modA d/t decreased ROM and pain from sx. Pt participated in MMT, ROM, and sensation screening with deficits in BLE ROM, strength, and sensation. Sensation impairments noted as present prior to this admit d/t pt reported neuropathy. Pt attempted to vomit while seated EOB, however, no emesis occurred at this time. Pt's BP was noted to be elevated with systolic >200 at this time; nsg notified. Pt tf to chair with CGA and rwx and BLE were elevated. Pt reports that while \"dry heaving\" his BP usually rises substantially. He remained seated in chair with BLE elevated for approx 10 minutes and BP was " decreased to 176/88. He noted that this is his usual BP and he was educated on average BP stats. Pt requested to cont with therapy and asked to amb down [place]. PT came into standing with CGA and amb down [place] and back to chair with standby assist and rwx. Pt was seated in chair and nsg notified of pt BP during eval. Pt encouraged to call nsg for tf to bed or BR. Pt did not demonstrate deficits or impairments that require skilled OT services at this time. OT to sign off and defer cont bal and mobility training to PT. Pt would benefit from assist at home at [place].     Time Calculation:         Time Calculation- OT       Row Name 02/17/24 0754             Time Calculation- OT    OT Start Time 0710  -JJ (r) KG (t) JJ (c)      OT Stop Time 0750  -JJ (r) KG (t) JJ (c)      OT Time Calculation (min) 40 min  -JJ (r) KG (t)      OT Received On 02/17/24  -JJ (r) KG (t) JJ (c)         Timed Charges    89632 - OT Self Care/Mgmt Minutes 10  -JJ (r) KG (t) JJ (c)         Untimed Charges    OT Eval/Re-eval Minutes 30  -JJ (r) KG (t) JJ (c)         Total Minutes    Timed Charges Total Minutes 10  -JJ (r) KG (t)      Untimed Charges Total Minutes 30  -JJ (r) KG (t)       Total Minutes 40  -JJ (r) KG (t)                User Key  (r) = Recorded By, (t) = Taken By, (c) = Cosigned By      Initials Name Provider Type    Steffi Oliver OTR/L, CSRS Occupational Therapist    Adolfo Crisostomo OT Student OT Student                         OT Discharge Summary  Anticipated Discharge Disposition (OT): home with assist  Reason for Discharge: At baseline function  Outcomes Achieved:  (OT eval x1)  Discharge Destination: Home with assist    KURT Jackson  2/17/2024

## 2024-02-17 NOTE — PROGRESS NOTES
Daily Progress Note  Manuel Moscoso  MRN: [id number] LOS: 3    Admit Date: 2/14/2024 2/17/2024 08:39 CST    Subjective:      Chief Complaint:  No chief complaint on file.      Interval History:    Reviewed overnight events and nursing notes.   No acute events overnight, but reports significant constipation.  Reports not having a bowel movement for 5 days.  He is passing gas.  Discussed with nursing staff.    Review of Systems   Constitutional:  Negative for chills and fever.   Respiratory:  Negative for shortness of breath.    Cardiovascular:  Negative for chest pain.   Gastrointestinal:  Positive for abdominal pain, constipation and nausea. Negative for diarrhea and vomiting.   Musculoskeletal:  Positive for back pain.   Skin:  Negative for color change.       DIET:  Diet: Regular/House Diet; Texture: Regular Texture (IDDSI 7); Fluid Consistency: Thin (IDDSI 0)    Medications:   lactated ringers, 100 mL/hr  lactated ringers, 9 mL/hr  lactated ringers, 100 mL/hr, Last Rate: 100 mL/hr (02/16/24 1001)  lactated ringers, 100 mL/hr, Last Rate: 100 mL/hr (02/16/24 1000)  sodium chloride, 75 mL/hr, Last Rate: 75 mL/hr (02/17/24 0359)      atorvastatin, 20 mg, Oral, Daily  ceFAZolin, 1,000 mg, Intravenous, Q8H  cloNIDine, 0.1 mg, Oral, BID  famotidine, 20 mg, Intravenous, Q12H   Or  famotidine, 20 mg, Oral, Q12H  gabapentin, 300 mg, Oral, TID  hydroCHLOROthiazide, 12.5 mg, Oral, Daily  insulin lispro, 2-7 Units, Subcutaneous, 4x Daily AC & at Bedtime  losartan, 25 mg, Oral, Daily  metFORMIN, 1,000 mg, Oral, BID AC  polyethylene glycol, 17 g, Oral, BID  sodium chloride, 3 mL, Intravenous, Q12H        Data:     Code Status:   Code Status and Medical Interventions:   Ordered at: 02/14/24 1723     Code Status (Patient has no pulse and is not breathing):    CPR (Attempt to Resuscitate)     Medical Interventions (Patient has pulse or is breathing):    Full       History reviewed. No pertinent family history.  Social  History     Socioeconomic History    Marital status:    Tobacco Use    Smoking status: Former     Types: Cigarettes     Quit date:      Years since quittin.1    Smokeless tobacco: Never   Vaping Use    Vaping Use: Never used   Substance and Sexual Activity    Alcohol use: Yes     Comment: rarely    Drug use: Never    Sexual activity: Defer       Labs:    Lab Results (last 72 hours)       Procedure Component Value Units Date/Time    POC Glucose Once [[id number]]  (Abnormal) Collected: 24 0743    Specimen: Blood Updated: 24 0755     Glucose 142 mg/dL      Comment: : [id number] Armindamyronbeena DebbiejaymieyMeter ID: [id number]       POC Glucose Once [[id number]]  (Abnormal) Collected: 24    Specimen: Blood Updated: 24     Glucose 131 mg/dL      Comment: : [id number] Tamie McqueenerMeter ID: [id number]       POC Glucose Once [[id number]]  (Abnormal) Collected: 24 1659    Specimen: Blood Updated: 24 1710     Glucose 156 mg/dL      Comment: : [id number] James GongoraineMeter ID: [id number]       POC Glucose Once [[id number]]  (Normal) Collected: 24 1500    Specimen: Blood Updated: 24 1512     Glucose 123 mg/dL      Comment: : [id number] Porter WonguaMeter ID: [id number]       Vitamin B12 [[id number]]  (Normal) Collected: 24 0453    Specimen: Blood Updated: 24 1214     Vitamin B-12 485 pg/mL     Narrative:      Results may be falsely increased if patient taking Biotin.      Folate [[id number]]  (Normal) Collected: 24 0453    Specimen: Blood Updated: 24 1214     Folate 13.50 ng/mL     Narrative:      Results may be falsely increased if patient taking Biotin.      POC Glucose Once [[id number]]  (Abnormal) Collected: 24 0737    Specimen: Blood Updated: 24 0748     Glucose 171 mg/dL      Comment: : [id number] James GongoraineMeter ID: [id number]       Iron Profile [[id number]]  (Normal) Collected: 24 0453    Specimen:  Blood Updated: 02/16/24 0541     Iron 73 mcg/dL      Iron Saturation (TSAT) 24 %      Transferrin 206 mg/dL      TIBC 307 mcg/dL     POC Glucose Once [[id number]]  (Abnormal) Collected: 02/15/24 2003    Specimen: Blood Updated: 02/15/24 2015     Glucose 175 mg/dL      Comment: : [id number] Tamie TannerMeter ID: [id number]       POC Glucose Once [[id number]]  (Abnormal) Collected: 02/15/24 1637    Specimen: Blood Updated: 02/15/24 1649     Glucose 140 mg/dL      Comment: : [id number] Monk LouevaMeter ID: [id number]       POC Glucose Once [[id number]]  (Normal) Collected: 02/15/24 1100    Specimen: Blood Updated: 02/15/24 1111     Glucose 109 mg/dL      Comment: : [id number] James JasmineMeter ID: [id number]       POC Glucose Once [[id number]]  (Normal) Collected: 02/15/24 0724    Specimen: Blood Updated: 02/15/24 0735     Glucose 105 mg/dL      Comment: : [id number] James JasmineMeter ID: [id number]       Basic Metabolic Panel [[id number]]  (Abnormal) Collected: 02/15/24 0336    Specimen: Blood Updated: 02/15/24 0457     Glucose 163 mg/dL      BUN 16 mg/dL      Creatinine 1.02 mg/dL      Sodium 137 mmol/L      Potassium 4.4 mmol/L      Comment: Slight hemolysis detected by analyzer. Result may be falsely elevated.        Chloride 103 mmol/L      CO2 22.0 mmol/L      Calcium 9.0 mg/dL      BUN/Creatinine Ratio 15.7     Anion Gap 12.0 mmol/L      eGFR 83.1 mL/min/1.73     Narrative:      GFR Normal >60  Chronic Kidney Disease <60  Kidney Failure <15      CBC & Differential [[id number]]  (Abnormal) Collected: 02/15/24 0336    Specimen: Blood Updated: 02/15/24 0429    Narrative:      The following orders were created for panel order CBC & Differential.  Procedure                               Abnormality         Status                     ---------                               -----------         ------                     CBC Auto Differential[[id number]]        Abnormal            Final result              "    Please view results for these tests on the individual orders.    CBC Auto Differential [[id number]]  (Abnormal) Collected: 02/15/24 0336    Specimen: Blood Updated: 02/15/24 0429     WBC 12.29 10*3/mm3      RBC 4.44 10*6/mm3      Hemoglobin 14.0 g/dL      Hematocrit 40.9 %      MCV 92.1 fL      MCH 31.5 pg      MCHC 34.2 g/dL      RDW 12.5 %      RDW-SD 42.2 fl      MPV 11.5 fL      Platelets 213 10*3/mm3      Neutrophil % 82.3 %      Lymphocyte % 10.0 %      Monocyte % 7.2 %      Eosinophil % 0.0 %      Basophil % 0.2 %      Immature Grans % 0.3 %      Neutrophils, Absolute 10.11 10*3/mm3      Lymphocytes, Absolute 1.23 10*3/mm3      Monocytes, Absolute 0.89 10*3/mm3      Eosinophils, Absolute 0.00 10*3/mm3      Basophils, Absolute 0.02 10*3/mm3      Immature Grans, Absolute 0.04 10*3/mm3      nRBC 0.0 /100 WBC     POC Glucose Once [[id number]]  (Normal) Collected: 24 1451    Specimen: Blood Updated: 24 1504     Glucose 119 mg/dL      Comment: : [id number] Andrew JamisonistalynnMeter ID: [id number]       POC Glucose Once [[id number]]  (Abnormal) Collected: 24 0931    Specimen: Blood Updated: 24 0943     Glucose 133 mg/dL      Comment: : [id number] Alex LaurenMeter ID: [id number]                 Objective:     Vitals: /88 (BP Location: Right arm, Patient Position: Sitting)   Pulse 87   Temp 98.6 °F (37 °C) (Oral)   Resp 18   Ht 174.8 cm (68.82\")   Wt 105 kg (230 lb 13.2 oz)   SpO2 98%   BMI 34.27 kg/m²    Intake/Output Summary (Last 24 hours) at 2024 0839  Last data filed at 2024 1420  Gross per 24 hour   Intake 1700 ml   Output --   Net 1700 ml    Temp (24hrs), Av.2 °F (36.8 °C), Min:97.6 °F (36.4 °C), Max:98.9 °F (37.2 °C)      Physical Exam  Vitals reviewed.   Constitutional:       Appearance: Normal appearance.   HENT:      Head: Normocephalic and atraumatic.   Eyes:      General:         Right eye: No discharge.         Left eye: No discharge.      " Extraocular Movements: Extraocular movements intact.      Conjunctiva/sclera: Conjunctivae normal.   Cardiovascular:      Rate and Rhythm: Normal rate and regular rhythm.      Pulses: Normal pulses.      Heart sounds: No murmur heard.  Pulmonary:      Effort: Pulmonary effort is normal. No respiratory distress.      Breath sounds: No wheezing.   Abdominal:      General: Abdomen is flat. Bowel sounds are normal. There is distension.   Skin:     Capillary Refill: Capillary refill takes less than 2 seconds.   Neurological:      General: No focal deficit present.      Mental Status: He is alert.   Psychiatric:         Mood and Affect: Mood normal.             Assessment and Plan:     Primary Problem:  Lumbar stenosis    Hospital Problem list:    Lumbar stenosis    Lumbosacral disc disease    Type 2 diabetes mellitus with hyperglycemia, without long-term current use of insulin    Essential hypertension    Drug-induced constipation      PMH:  Past Medical History:   Diagnosis Date    Allergy to alpha-gal     Arthritis     Chronic bilateral low back pain without sciatica 12/19/2022    Diabetes mellitus     Hearing loss, left     History of staph infection     Hypertension     Low back pain     Lumbosacral disc disease 12/19/2022    Psoriasis     Sleep apnea     Tinnitus     Vision disturbance     using rx eyedrops       Treatment Plan:  Patient underwent second stage 2/16/2024.  Stable for discharge medically.  Close follow-up with his primary care next week for TCM/hospital follow-up.     Type 2 diabetes-Glucotrol on hold due to n.p.o. for surgical intervention.  Patient on Accu-Cheks sliding scale insulin.  Okay to restart his home diabetic meds upon discharge with close follow-up with his primary.      Constipation/obstipation-continue with stool softeners.  Takes MiraLAX daily at home.  He is interested in an enema, discussed with nursing staff for fleets enema today.      Patient is medically stable for discharge per  surgery once he has bowel movement.     Explained to patient and staff that we were consulted for medical management during their acute care hospitalization. The Medical Consultation was requested by the Attending Physician. The patient has been recommended to f/u with their regular primary care provider concerning any further treatment and review of abnormalities found during their hospitalization at [facility]. They agree with the treatment plan as well as understand our role in their hospitalization. All questions were encouraged and answered to the best of my ability.    Disposition: Medically stable for discharge after BM    Reviewed treatment plans with the patient and/or family.   30 minutes spent in face to face interaction and coordination of care.     Electronically signed by Nigel Bradshaw MD on 2/17/2024 at 08:39 CST

## 2024-02-17 NOTE — THERAPY DISCHARGE NOTE
Patient Name: Manuel Moscoso  : 1960    MRN: [id number]                              Today's Date: 2024       Admit Date: 2024    Visit Dx:     ICD-10-CM ICD-9-CM   1. Impaired mobility [Z74.09]  Z74.09 799.89   2. Spinal stenosis of lumbar region without neurogenic claudication  M48.061 724.02     Patient Active Problem List   Diagnosis    Lumbosacral disc disease    Chronic bilateral low back pain without sciatica    Lumbago of multiple sites in spine with sciatica    Type 2 diabetes mellitus with hyperglycemia, without long-term current use of insulin    Essential hypertension    Drug-induced constipation    Lumbar stenosis     Past Medical History:   Diagnosis Date    Allergy to alpha-gal     Arthritis     Chronic bilateral low back pain without sciatica 2022    Diabetes mellitus     Hearing loss, left     History of staph infection     Hypertension     Low back pain     Lumbosacral disc disease 2022    Psoriasis     Sleep apnea     Tinnitus     Vision disturbance     using rx eyedrops     Past Surgical History:   Procedure Laterality Date    ANTERIOR LUMBAR EXPOSURE N/A 2022    Procedure: ANTERIOR LUMBAR EXPOSURE;  Surgeon: Simba Dyson DO;  Location:  [facility] OR;  Service: Vascular;  Laterality: N/A;    BACK SURGERY      x 2    CHOLECYSTECTOMY      COLONOSCOPY      GANGLION CYST EXCISION Left     wrist    LIPOMA EXCISION      back    LUMBAR FUSION Left 2022    Procedure: LEFT LATERAL LUMBAR INTERBODY FUSION WITH INSTRUMENTATION L4-5;  Surgeon: CHARITO Garcia MD;  Location:  [facility] OR;  Service: Orthopedic Spine;  Laterality: Left;    LUMBAR FUSION N/A 2022    Procedure: ANTERIOR DECOMPRESSION, ANTERIOR LUMBAR INTERBODY FUSION WITH INSTRUMENTATION L5-S1;  Surgeon: CHARITO Garcia MD;  Location:  [facility] OR;  Service: Orthopedic Spine;  Laterality: N/A;    LUMBAR LAMINECTOMY WITH FUSION N/A 2022    Procedure: POSTERIOR SPINAL FUSION WITH  INSTRUMENTATION L4-S1;  Surgeon: CHARITO Garcia MD;  Location: [facility];  Service: Orthopedic Spine;  Laterality: N/A;      General Information       Row Name 02/17/24 1022          Physical Therapy Time and Intention    Document Type re-evaluation  2/16 Partial removal of post instrumentation L4-S1, exploration of fusion L4-S1, PSF L2-4, post spinal instrumentation L2-S1.  -DOUG     Mode of Treatment physical therapy  -DOUG       Row Name 02/17/24 1022          General Information    Patient Profile Reviewed yes  -DOUG     Existing Precautions/Restrictions fall;LSO;spinal;brace worn when out of bed  -DOUG     Barriers to Rehab none identified  -DOUG       Row Name 02/17/24 1022          Living Environment    People in Home spouse;child(nayeli), adult  -DOUG       Row Name 02/17/24 1022          Home Main Entrance    Number of Stairs, Main Entrance two  -DOUG       Row Name 02/17/24 1022          Stairs Within Home, Primary    Number of Stairs, Within Home, Primary none  -DOUG       Row Name 02/17/24 1022          Safety Issues, Functional Mobility    Impairments Affecting Function (Mobility) pain;sensation/sensory awareness  -DOUG               User Key  (r) = Recorded By, (t) = Taken By, (c) = Cosigned By      Initials Name Provider Type    DOUG Rakesh Cheung, PT DPT Physical Therapist                   Mobility       Row Name 02/17/24 1022          Bed Mobility    Comment, (Bed Mobility) on toilet  -DOUG       Row Name 02/17/24 1022          Sit-Stand Transfer    Sit-Stand Simpson (Transfers) supervision  -DOUG     Assistive Device (Sit-Stand Transfers) walker, front-wheeled  -DOUG       Row Name 02/17/24 1022          Gait/Stairs (Locomotion)    Simpson Level (Gait) supervision  -DOUG     Assistive Device (Gait) walker, front-wheeled  -DOUG     Distance in Feet (Gait) 100 ft  -DOUG     Bilateral Gait Deviations forward flexed posture  -DOUG               User Key  (r) = Recorded By, (t) = Taken By, (c) = Cosigned By       Initials Name Provider Type    Rakesh Pacheco, PT DPT Physical Therapist                   Obj/Interventions       [facility] Name 02/17/24 1022          Range of Motion Comprehensive    General Range of Motion bilateral lower extremity ROM WFL  -DOUG       [facility] Name 02/17/24 1022          Strength Comprehensive (MMT)    Comment, General Manual Muscle Testing (MMT) Assessment B LE grossly 4/5  -DOUG       [facility] Name 02/17/24 1022          Balance    Balance Assessment sitting dynamic balance;standing dynamic balance  -DOUG     Dynamic Sitting Balance supervision  -DOUG     Position, Sitting Balance unsupported;sitting in chair  -DOUG     Dynamic Standing Balance supervision  -DOUG     Position/Device Used, Standing Balance walker, rolling  -DOUG       Row Name 02/17/24 1022          Sensory Assessment (Somatosensory)    Sensory Assessment (Somatosensory) other (see comments)  hx peripheral neuropathy  -DOUG               User Key  (r) = Recorded By, (t) = Taken By, (c) = Cosigned By      Initials Name Provider Type    Rakesh Pacheco, PT DPT Physical Therapist                   Goals/Plan    No documentation.                  Clinical Impression       [facility] Name 02/17/24 1022          Pain    Pretreatment Pain Rating 3/10  -DOUG     Pain Location incisional  -DOUG     Pain Location - back  -DOUG     Pain Intervention(s) Repositioned;Ambulation/increased activity  -DOUG       [facility] Name 02/17/24 1022          Plan of Care Review    Plan of Care Reviewed With patient  -DOUG     Progress improving  -DOUG     Outcome Evaluation PT re-eval complete. He is alert and oriented x 4. Was found in bathroom, able to have a BM. He was educated on back precautions and LSO use/radha/doff. He stands and ambluates with a RW and supervision from PT. Ambulates ~ 100 ft. He reports he is at his baseline mobility and he had no further PT needs, questions or concerns. PT to sign off. He is hopeful to d.c home today.  -DOUG       [facility] Name 02/17/24 1022          Therapy  Assessment/Plan (PT)    Patient/Family Therapy Goals Statement (PT) go home  -DOUG     Criteria for Skilled Interventions Met (PT) no;does not meet criteria for skilled intervention  -DOUG     Therapy Frequency (PT) evaluation only  -DOUG       Row Name 02/17/24 1022          Positioning and Restraints    Pre-Treatment Position bathroom  -DOUG     Post Treatment Position chair  -DOUG     In Chair sitting;call light within reach;encouraged to call for assist  -DOUG               User Key  (r) = Recorded By, (t) = Taken By, (c) = Cosigned By      Initials Name Provider Type    Rakesh Pacheco, PT DPT Physical Therapist                   Outcome Measures       Row Name 02/17/24 1022 02/17/24 0815       How much help from another person do you currently need...    Turning from your back to your side while in flat bed without using bedrails? 4  -DOUG 4  -KS    Moving from lying on back to sitting on the side of a flat bed without bedrails? 4  -DOUG 4  -KS    Moving to and from a bed to a chair (including a wheelchair)? 3  -DOUG 4  -KS    Standing up from a chair using your arms (e.g., wheelchair, bedside chair)? 3  -DOUG 3  -KS    Climbing 3-5 steps with a railing? 3  -DOUG 3  -KS    To walk in hospital room? 3  -DOUG 3  -KS    AM-PAC 6 Clicks Score (PT) 20  -DOUG 21  -KS    Highest Level of Mobility Goal 6 --> Walk 10 steps or more  -DOUG 6 --> Walk 10 steps or more  -KS      Row Name 02/17/24 1022 02/17/24 0754       Functional Assessment    Outcome Measure Options AM-PAC 6 Clicks Basic Mobility (PT)  -DOUG AM-PAC 6 Clicks Daily Activity (OT)  -JJ (r) KG (t) JJ (c)              User Key  (r) = Recorded By, (t) = Taken By, (c) = Cosigned By      Initials Name Provider Type    Rakesh Pacheco, PT DPT Physical Therapist    Steffi Oliver, OTR/L, CSRS Occupational Therapist    Isha Jeff RN Registered Nurse    Adolfo Crisostomo, OT Student OT Student                  Physical Therapy Education       Title: PT OT SLP  Therapies (In Progress)       Topic: Physical Therapy (Resolved)       Point: Mobility training (Resolved)       Learning Progress Summary             Patient Acceptance, E, VU,DU by DOUG at 2/17/2024 1022    Comment: back prec, LSO use/radha/doff, gait safety    Acceptance, E, VU by AJ at 2/15/2024 0845    Comment: PT role in care, spinal precautions, LSO brace                         Point: Home exercise program (Resolved)       Learner Progress:  Not documented in this visit.              Point: Body mechanics (Resolved)       Learning Progress Summary             Patient Acceptance, E, VU by RELL at 2/15/2024 0845    Comment: PT role in care, spinal precautions, LSO brace                         Point: Precautions (Resolved)       Learning Progress Summary             Patient Acceptance, E, VU,DU by DOUG at 2/17/2024 1022    Comment: back prec, LSO use/radha/doff, gait safety    Acceptance, E, VU by RELL at 2/15/2024 0845    Comment: PT role in care, spinal precautions, LSO brace                                         User Key       Initials Effective Dates Name Provider Type Discipline    DOUG 02/03/23 -  Rakesh Cheung, PT DPT Physical Therapist PT    RELL 12/14/23 -  Cade White, PT Student PT Student PT                  PT Recommendation and Plan     Plan of Care Reviewed With: patient  Progress: improving  Outcome Evaluation: PT re-eval complete. He is alert and oriented x 4. Was found in bathroom, able to have a BM. He was educated on back precautions and LSO use/radha/doff. He stands and ambluates with a RW and supervision from PT. Ambulates ~ 100 ft. He reports he is at his baseline mobility and he had no further PT needs, questions or concerns. PT to sign off. He is hopeful to d.c home today.     Time Calculation:         PT Charges       Row Name 02/17/24 1237             Time Calculation    Start Time 1022  -DOUG      Stop Time 1103  -DOUG      Time Calculation (min) 41 min  -DOUG      PT Received On 02/17/24  -DOUG                 User Key  (r) = Recorded By, (t) = Taken By, (c) = Cosigned By      Initials Name Provider Type    Rakesh Pacheco, PT DPT Physical Therapist                  Therapy Charges for Today       Code Description Service Date Service Provider Modifiers Qty    53338821158 HC PT RE-EVAL ESTABLISHED PLAN 2 2/17/2024 Rakesh Cheung, PT DPT GP 1    87101485051 HC GAIT TRAINING EA 15 MIN 2/17/2024 Rakesh Cheung, PT DPT GP 1            PT G-Codes  Outcome Measure Options: AM-PAC 6 Clicks Basic Mobility (PT)  AM-PAC 6 Clicks Score (PT): 20  AM-PAC 6 Clicks Score (OT): 21    PT Discharge Summary  Anticipated Discharge Disposition (PT): home with assist    Rakesh Cheung, PT DPT  2/17/2024

## 2024-02-17 NOTE — PLAN OF CARE
Goal Outcome Evaluation:  Pt. A&Ox4, VSS, ambulates with LSO brace and walker to BR with  standby assist. Enema given this shift with positive bowel movement results, d/c orders received, d/c education completed, posterior spinal drsg changed per order to gauze and transparent film, d/c to home with belongings via wheelchair with sister at 1350.

## 2024-02-17 NOTE — PLAN OF CARE
Goal Outcome Evaluation:  Problem: Adult Inpatient Plan of Care  Goal: Plan of Care Review  Outcome: Ongoing, Progressing  Flowsheets (Taken 2/17/2024 1086)  Progress: no change  Plan of Care Reviewed With: patient  Outcome Evaluation: Pt is A&Ox4. Up with standby assist to BR using LSO and walker. Dressing to back with small amount of drainage. No new N/T. VSS. Safety maintained.

## 2024-02-17 NOTE — DISCHARGE SUMMARY
Date of Discharge:  2/17/2024    Admission Diagnosis: M54.16    Discharge Diagnosis:   1. Status post left L4-5 microdiskectomy, 11/18/2019   2. Status post revision left L4-5 microdiskectomy, insertion annular closure device, 11/01/2021   3. Status post anterior decompression, LEIGH with instrumentation L5-S1, 12/19/2022   4. Status post left LLIF with instrumentation L4-5, 12/21/2022   5. Status post PSF with instrumentation L4-S1, 12/23/2022  6. Persistent chronic low back pain   7. Bilateral lower extremity weakness   8. Persistent left lower extremity numbness, tingling   9. Adjacent level degenerative disc disease L2-4   10. Facet arthropathy L2-4   11. Retrolisthesis L3-4   12. Chronic central disc herniation L2-3, L3-4   13. Central and foraminal stenosis L2-4   14. History of diabetic peripheral neuropathy   15. History of alpha gal syndrome after tick bite  16.  Status post right LLIF L2-4 with instrumentation L2-3, 2/14/2024  17.  Status post partial LUCIE, EOF L4-S1, PSF L2-4, posterior spinal instrumentation L2-S1, 2/16/2024     Consults During Admission: Dr. Bradshaw/Dr. Chao    Hospital Course  Patient is a 63 y.o. male Known to our practice. Admitted for the above staged fusion.  This has been well tolerated and the patient will be discharged home today in good stable condition with instructions for brace when out of bed.  No driving until directed.  Patient will follow-up with Dr. Garcia's clinic in two weeks. They will call if problems arise.       Condition on Discharge:  STABLE    Vital Signs  Temp:  [97.6 °F (36.4 °C)-98.9 °F (37.2 °C)] 98.1 °F (36.7 °C)  Heart Rate:  [66-93] 84  Resp:  [14-22] 18  BP: (130-197)/() 130/72    Physical Exam:   Alert and oriented ×3, no acute distress, grossly neurovascularly intact, vital signs stable, dressing clean dry and intact, moving all extremities without focal deficit      Discharge Disposition      Discharge Medications     Discharge Medications         ASK your doctor about these medications        Instructions Start Date   aspirin 81 MG EC tablet   81 mg, Oral, Daily      atorvastatin 20 MG tablet  Commonly known as: LIPITOR   20 mg, Oral, Daily      cloNIDine 0.1 MG tablet  Commonly known as: CATAPRES   0.1 mg, Oral, 2 Times Daily, Last Fill date 90 days 8/23      etanercept 50 MG/ML solution prefilled syringe injection  Commonly known as: ENBREL   50 mg, Subcutaneous, Weekly, Mondays      gabapentin 300 MG capsule  Commonly known as: NEURONTIN   300 mg, Oral, 3 Times Daily, Last filled November 2023 for 30 days      glipizide 5 MG ER tablet  Commonly known as: GLUCOTROL XL  Ask about: Which instructions should I use?   5 mg, Oral, Daily      hydroCHLOROthiazide 12.5 MG tablet   12.5 mg, Oral, Daily      losartan 25 MG tablet  Commonly known as: COZAAR   25 mg, Oral, Daily      metFORMIN 1000 MG tablet  Commonly known as: GLUCOPHAGE   1,000 mg, Oral, 2 Times Daily With Meals      polyethylene glycol 17 g packet  Commonly known as: MIRALAX   17 g, Oral, Daily               Discharge Diet: Resume Home diet, advance as tolerated    Activity at Discharge: Resume home activity, advance as tolerated, no lifting, no twisting, no bending, brace as directed, no driving until directed.     Follow-up Appointments  Followup with PCP within one week  Followup [facility] at 2 weeks post-op         Bhargav Nolen PA-C  02/17/24  11:40 CST

## 2024-02-17 NOTE — PLAN OF CARE
"Goal Outcome Evaluation:  Plan of Care Reviewed With: patient           Outcome Evaluation: OT reeval complete. Pt presented seated EOB wearing LSO with c/o 4/5 pain. He was able to recall and recite spinal precautions with no vcs req. At pt request, he donned undergarments with modA d/t decreased ROM and pain from sx. Pt participated in MMT, ROM, and sensation screening with deficits in BLE ROM, strength, and sensation. Sensation impairments noted as present prior to this admit d/t pt reported neuropathy. Pt attempted to vomit while seated EOB, however, no emesis occurred at this time. Pt's BP was noted to be elevated with systolic >200 at this time; nsg notified. Pt tf to chair with CGA and rwx and BLE were elevated. Pt reports that while \"dry heaving\" his BP usually rises substantially. He remained seated in chair with BLE elevated for approx 10 minutes and BP was decreased to 176/88. He noted that this is his usual BP and he was educated on average BP stats. Pt requested to cont with therapy and asked to amb down [place]. PT came into standing with CGA and amb down [place] and back to chair with standby assist and rwx. Pt was seated in chair and nsg notified of pt BP during eval. Pt encouraged to call nsg for tf to bed or BR. Pt did not demonstrate deficits or impairments that require skilled OT services at this time. OT to sign off and defer cont bal and mobility training to PT. Pt would benefit from assist at home at [place].      Anticipated Discharge Disposition (OT): home with assist                        "

## 2024-02-17 NOTE — PLAN OF CARE
Goal Outcome Evaluation:  Plan of Care Reviewed With: patient        Progress: improving  Outcome Evaluation: PT re-eval complete. He is alert and oriented x 4. Was found in bathroom, able to have a BM. He was educated on back precautions and LSO use/radha/doff. He stands and ambluates with a RW and supervision from PT. Ambulates ~ 100 ft. He reports he is at his baseline mobility and he had no further PT needs, questions or concerns. PT to sign off. He is hopeful to d.c home today.      Anticipated Discharge Disposition (PT): home with assist

## 2024-02-18 ENCOUNTER — READMISSION MANAGEMENT (OUTPATIENT)
Dept: CALL CENTER | Facility: HOSPITAL | Age: 64
End: 2024-02-18
Payer: COMMERCIAL

## 2024-02-18 NOTE — PAYOR COMM NOTE
"DC HOME 2-17-24    Maegan Foster (63 y.o. Male)       Date of Birth   1960    Social Security Number       Address   [address] [place] [address]    Home Phone   [phone]    MRN   [id number]       [employer]   Other    Marital Status                               Admission Date   2/14/24    Admission Type   Elective    Admitting Provider   CHARITO Garcia MD    Attending Provider       Department, Room/Bed   [facility] 3A, 354/1       Discharge Date   2/17/2024    Discharge Disposition   Home or Self Care    Discharge Destination                                 Attending Provider: (none)   Allergies: Alpha-gal, Pork-derived Products, Penicillins    Isolation: None   Infection: None   Code Status: Prior    Ht: 174.8 cm (68.82\")   Wt: 105 kg (230 lb 13.2 oz)    Admission Cmt: None   Principal Problem: Lumbar stenosis [M48.061]                   Active Insurance as of 2/14/2024       Primary Coverage       Payor Plan Insurance Group Employer/Plan Group    ANTHEM BLUE CROSS ANTHEM BLUE CROSS BLUE SHIELD PPO [id number]       Payor Plan Address Payor Plan Phone Number Payor Plan Fax Number Effective Dates    [address] [phone]  3/1/2021 - None Entered    [facility] [id number]         Subscriber Name Subscriber Birth Date Member ID       MAEGAN FOSTER 1960 [id number]               Secondary Coverage       Payor Plan Insurance Group Employer/Plan Group    ANTHEM MEDICAID ANTHEM MEDICAID [id number]       Payor Plan Address Payor Plan Phone Number Payor Plan Fax Number Effective Dates    [address] [address] [phone]  1/1/2024 - None Entered    [facility] [address]         Subscriber Name Subscriber Birth Date Member ID       MAEGAN FOSTER 1960 [id number]                     Emergency Contacts        (Rel.) Home Phone Work Phone Mobile Phone    HECTOR FOSTER (Friend) -- -- [phone]                 Discharge Summary    "     Bhargav Nolen PA-C at 02/17/24 1139            Date of Discharge:  2/17/2024    Admission Diagnosis: M54.16    Discharge Diagnosis:   1. Status post left L4-5 microdiskectomy, 11/18/2019   2. Status post revision left L4-5 microdiskectomy, insertion annular closure device, 11/01/2021   3. Status post anterior decompression, shelter with instrumentation L5-S1, 12/19/2022   4. Status post left LLIF with instrumentation L4-5, 12/21/2022   5. Status post PSF with instrumentation L4-S1, 12/23/2022  6. Persistent chronic low back pain   7. Bilateral lower extremity weakness   8. Persistent left lower extremity numbness, tingling   9. Adjacent level degenerative disc disease L2-4   10. Facet arthropathy L2-4   11. Retrolisthesis L3-4   12. Chronic central disc herniation L2-3, L3-4   13. Central and foraminal stenosis L2-4   14. History of diabetic peripheral neuropathy   15. History of alpha gal syndrome after tick bite  16.  Status post right LLIF L2-4 with instrumentation L2-3, 2/14/2024  17.  Status post partial LUCIE, EOF L4-S1, PSF L2-4, posterior spinal instrumentation L2-S1, 2/16/2024     Consults During Admission: Dr. Bradshaw/Dr. Chao    Hospital Course  Patient is a 63 y.o. male Known to our practice. Admitted for the above staged fusion.  This has been well tolerated and the patient will be discharged home today in good stable condition with instructions for brace when out of bed.  No driving until directed.  Patient will follow-up with Dr. Garica's clinic in two weeks. They will call if problems arise.       Condition on Discharge:  STABLE    Vital Signs  Temp:  [97.6 °F (36.4 °C)-98.9 °F (37.2 °C)] 98.1 °F (36.7 °C)  Heart Rate:  [66-93] 84  Resp:  [14-22] 18  BP: (130-197)/() 130/72    Physical Exam:   Alert and oriented ×3, no acute distress, grossly neurovascularly intact, vital signs stable, dressing clean dry and intact, moving all extremities without focal deficit      Discharge  Disposition      Discharge Medications     Discharge Medications        ASK your doctor about these medications        Instructions Start Date   aspirin 81 MG EC tablet   81 mg, Oral, Daily      atorvastatin 20 MG tablet  Commonly known as: LIPITOR   20 mg, Oral, Daily      cloNIDine 0.1 MG tablet  Commonly known as: CATAPRES   0.1 mg, Oral, 2 Times Daily, Last Fill date 90 days 8/23      etanercept 50 MG/ML solution prefilled syringe injection  Commonly known as: ENBREL   50 mg, Subcutaneous, Weekly, Mondays      gabapentin 300 MG capsule  Commonly known as: NEURONTIN   300 mg, Oral, 3 Times Daily, Last filled November 2023 for 30 days      glipizide 5 MG ER tablet  Commonly known as: GLUCOTROL XL  Ask about: Which instructions should I use?   5 mg, Oral, Daily      hydroCHLOROthiazide 12.5 MG tablet   12.5 mg, Oral, Daily      losartan 25 MG tablet  Commonly known as: COZAAR   25 mg, Oral, Daily      metFORMIN 1000 MG tablet  Commonly known as: GLUCOPHAGE   1,000 mg, Oral, 2 Times Daily With Meals      polyethylene glycol 17 g packet  Commonly known as: MIRALAX   17 g, Oral, Daily               Discharge Diet: Resume Home diet, advance as tolerated    Activity at Discharge: Resume home activity, advance as tolerated, no lifting, no twisting, no bending, brace as directed, no driving until directed.     Follow-up Appointments  Followup with PCP within one week  Followup [facility] at 2 weeks post-op         Bhargav Nolen PA-C  02/17/24  11:40 CST                Electronically signed by Bhargav Nolen PA-C at 02/17/24 2486

## 2024-02-20 ENCOUNTER — READMISSION MANAGEMENT (OUTPATIENT)
Dept: CALL CENTER | Facility: HOSPITAL | Age: 64
End: 2024-02-20
Payer: COMMERCIAL

## 2024-02-20 NOTE — OUTREACH NOTE
General Surgery Week 1 Survey      Flowsheet Row Responses   [facility] patient discharged from? [place]   Does the patient have one of the following disease processes/diagnoses(primary or secondary)? General Surgery   Week 1 attempt successful? Yes   Call start time 1705   Call end time 1715   Discharge diagnosis Lumbar stenosis, exploration of fusion and fusion   Person spoke with today (if not patient) and relationship Patient   Medication alerts for this patient Norco   Meds reviewed with patient/caregiver? Yes   Is the patient having any side effects they believe may be caused by any medication additions or changes? No   Does the patient have all medications related to this admission filled (includes all antibiotics, pain medications, etc.) Yes   Is the patient taking all medications as directed (includes completed medication regime)? Yes   Medication comments Pt states he has not taken any Norco for pain. He has been taking Tramadol. Only had to take a few for soreness.   Does the patient have a follow up appointment scheduled with their surgeon? No   What is preventing the patient from scheduling follow up appointments? Unsure of when or with whom   Nursing Interventions Educated patient on importance of making appointment, Advised patient to make appointment   Comments Pt understands to call Dr. Garcia's office to make an appt for f/u within 2 weeks of discharge.   Has home health visited the patient within 72 hours of discharge? N/A   Psychosocial issues? No   Comments Pt states he is doing well from the surgery. Bandage is intact on his back. He is up walking and wearing the back brace when out of bed. Pt states on Sunday morning he started having stomach issues (constipation) was seen at [facility] and admitted overnight for bowel cleanout. Advised pt pain medication can cause constipation. He states he has not taken any of those. Encouraged hydration and walking.   Did the patient receive a  copy of their discharge instructions? Yes   Nursing interventions Reviewed instructions with patient   What is the patient's perception of their health status since discharge? New symptoms unrelated to diagnosis   Nursing interventions Nurse provided patient education   Is the patient /caregiver able to teach back basic post-op care? Drive as instructed by MD in discharge instructions, Take showers only when approved by MD-sponge bathe until then, No tub bath, swimming, or hot tub until instructed by MD, Keep incision areas clean,dry and protected, Do not remove steri-strips, Lifting as instructed by MD in discharge instructions   Is the patient/caregiver able to teach back signs and symptoms of incisional infection? Increased redness, swelling or pain at the incisonal site, Fever   Is the patient/caregiver able to teach back steps to recovery at home? Set small, achievable goals for return to baseline health, Rest and rebuild strength, gradually increase activity   If the patient is a current smoker, are they able to teach back resources for cessation? Not a smoker   Is the patient/caregiver able to teach back the hierarchy of who to call/visit for symptoms/problems? PCP, Specialist, Home health nurse, Urgent Care, ED, 911 Yes   Week 1 call completed? Yes   Graduated Yes   Is the patient interested in additional calls from an ambulatory ? No   Would this patient benefit from a Referral to Amb Social Work? No   Wrap up additional comments Pt denies any needs or concerns. No questions. Doing well.   Call end time 2673            Mirna SANCHEZ - Registered Nurse

## 2024-02-28 ENCOUNTER — TRANSCRIBE ORDERS (OUTPATIENT)
Dept: ADMINISTRATIVE | Facility: HOSPITAL | Age: 64
End: 2024-02-28
Payer: MEDICAID

## 2024-02-28 DIAGNOSIS — M51.36 DDD (DEGENERATIVE DISC DISEASE), LUMBAR: Primary | ICD-10-CM

## 2024-02-29 ENCOUNTER — HOSPITAL ENCOUNTER (OUTPATIENT)
Dept: GENERAL RADIOLOGY | Facility: HOSPITAL | Age: 64
Discharge: HOME | End: 2024-02-29
Admitting: PHYSICIAN ASSISTANT
Payer: MEDICAID

## 2024-02-29 DIAGNOSIS — M51.36 DDD (DEGENERATIVE DISC DISEASE), LUMBAR: ICD-10-CM

## 2024-02-29 PROCEDURE — 72100 X-RAY EXAM L-S SPINE 2/3 VWS: CPT

## 2024-03-27 ENCOUNTER — TRANSCRIBE ORDERS (OUTPATIENT)
Dept: ADMINISTRATIVE | Facility: HOSPITAL | Age: 64
End: 2024-03-27
Payer: COMMERCIAL

## 2024-03-27 ENCOUNTER — HOSPITAL ENCOUNTER (OUTPATIENT)
Dept: GENERAL RADIOLOGY | Facility: HOSPITAL | Age: 64
Discharge: HOME | End: 2024-03-27
Admitting: ORTHOPAEDIC SURGERY
Payer: COMMERCIAL

## 2024-03-27 DIAGNOSIS — M54.16 LUMBAR RADICULOPATHY: Primary | ICD-10-CM

## 2024-03-27 DIAGNOSIS — M54.16 LUMBAR RADICULOPATHY: ICD-10-CM

## 2024-03-27 PROCEDURE — 72100 X-RAY EXAM L-S SPINE 2/3 VWS: CPT

## 2024-05-13 ENCOUNTER — TRANSCRIBE ORDERS (OUTPATIENT)
Dept: GENERAL RADIOLOGY | Facility: HOSPITAL | Age: 64
End: 2024-05-13
Payer: COMMERCIAL

## 2024-05-13 DIAGNOSIS — M51.36 DEGENERATION OF LUMBAR INTERVERTEBRAL DISC: Primary | ICD-10-CM

## 2024-05-14 ENCOUNTER — HOSPITAL ENCOUNTER (OUTPATIENT)
Dept: GENERAL RADIOLOGY | Facility: HOSPITAL | Age: 64
Discharge: HOME | End: 2024-05-14
Admitting: PHYSICIAN ASSISTANT
Payer: COMMERCIAL

## 2024-05-14 DIAGNOSIS — M51.36 DEGENERATION OF LUMBAR INTERVERTEBRAL DISC: ICD-10-CM

## 2024-05-14 PROCEDURE — 72110 X-RAY EXAM L-2 SPINE 4/>VWS: CPT

## 2025-03-10 ENCOUNTER — APPOINTMENT (OUTPATIENT)
Dept: CT IMAGING | Facility: HOSPITAL | Age: 65
End: 2025-03-10
Payer: COMMERCIAL

## 2025-03-10 ENCOUNTER — HOSPITAL ENCOUNTER (EMERGENCY)
Facility: HOSPITAL | Age: 65
Discharge: HOME | End: 2025-03-10
Attending: EMERGENCY MEDICINE | Admitting: EMERGENCY MEDICINE
Payer: COMMERCIAL

## 2025-03-10 VITALS
DIASTOLIC BLOOD PRESSURE: 71 MMHG | BODY MASS INDEX: 29.55 KG/M2 | HEART RATE: 77 BPM | TEMPERATURE: 97.6 F | RESPIRATION RATE: 19 BRPM | OXYGEN SATURATION: 97 % | HEIGHT: 69 IN | WEIGHT: 199.5 LBS | SYSTOLIC BLOOD PRESSURE: 135 MMHG

## 2025-03-10 DIAGNOSIS — R10.84 GENERALIZED ABDOMINAL PAIN: Primary | ICD-10-CM

## 2025-03-10 LAB
ALBUMIN SERPL-MCNC: 4.8 G/DL (ref 3.5–5.2)
ALBUMIN/GLOB SERPL: 1.2 G/DL
ALP SERPL-CCNC: 27 U/L (ref 39–117)
ALT SERPL W P-5'-P-CCNC: 14 U/L (ref 1–41)
ANION GAP SERPL CALCULATED.3IONS-SCNC: 16 MMOL/L (ref 5–15)
AST SERPL-CCNC: 20 U/L (ref 1–40)
BASOPHILS # BLD AUTO: 0.02 10*3/MM3 (ref 0–0.2)
BASOPHILS NFR BLD AUTO: 0.2 % (ref 0–1.5)
BILIRUB SERPL-MCNC: 0.7 MG/DL (ref 0–1.2)
BUN SERPL-MCNC: 17 MG/DL (ref 8–23)
BUN/CREAT SERPL: 17.3 (ref 7–25)
CALCIUM SPEC-SCNC: 10 MG/DL (ref 8.6–10.5)
CHLORIDE SERPL-SCNC: 103 MMOL/L (ref 98–107)
CO2 SERPL-SCNC: 22 MMOL/L (ref 22–29)
CREAT SERPL-MCNC: 0.98 MG/DL (ref 0.76–1.27)
DEPRECATED RDW RBC AUTO: 40.1 FL (ref 37–54)
EGFRCR SERPLBLD CKD-EPI 2021: 86.6 ML/MIN/1.73
EOSINOPHIL # BLD AUTO: 0.02 10*3/MM3 (ref 0–0.4)
EOSINOPHIL NFR BLD AUTO: 0.2 % (ref 0.3–6.2)
ERYTHROCYTE [DISTWIDTH] IN BLOOD BY AUTOMATED COUNT: 12.2 % (ref 12.3–15.4)
GLOBULIN UR ELPH-MCNC: 3.9 GM/DL
GLUCOSE SERPL-MCNC: 135 MG/DL (ref 65–99)
HCT VFR BLD AUTO: 45.2 % (ref 37.5–51)
HGB BLD-MCNC: 16 G/DL (ref 13–17.7)
IMM GRANULOCYTES # BLD AUTO: 0.02 10*3/MM3 (ref 0–0.05)
IMM GRANULOCYTES NFR BLD AUTO: 0.2 % (ref 0–0.5)
LIPASE SERPL-CCNC: 20 U/L (ref 13–60)
LYMPHOCYTES # BLD AUTO: 1.01 10*3/MM3 (ref 0.7–3.1)
LYMPHOCYTES NFR BLD AUTO: 9.2 % (ref 19.6–45.3)
MCH RBC QN AUTO: 31.4 PG (ref 26.6–33)
MCHC RBC AUTO-ENTMCNC: 35.4 G/DL (ref 31.5–35.7)
MCV RBC AUTO: 88.6 FL (ref 79–97)
MONOCYTES # BLD AUTO: 0.39 10*3/MM3 (ref 0.1–0.9)
MONOCYTES NFR BLD AUTO: 3.6 % (ref 5–12)
NEUTROPHILS NFR BLD AUTO: 86.6 % (ref 42.7–76)
NEUTROPHILS NFR BLD AUTO: 9.52 10*3/MM3 (ref 1.7–7)
NRBC BLD AUTO-RTO: 0 /100 WBC (ref 0–0.2)
PLATELET # BLD AUTO: 326 10*3/MM3 (ref 140–450)
PMV BLD AUTO: 9.9 FL (ref 6–12)
POTASSIUM SERPL-SCNC: 4 MMOL/L (ref 3.5–5.2)
PROT SERPL-MCNC: 8.7 G/DL (ref 6–8.5)
RBC # BLD AUTO: 5.1 10*6/MM3 (ref 4.14–5.8)
SODIUM SERPL-SCNC: 141 MMOL/L (ref 136–145)
WBC NRBC COR # BLD AUTO: 10.98 10*3/MM3 (ref 3.4–10.8)

## 2025-03-10 PROCEDURE — 96372 THER/PROPH/DIAG INJ SC/IM: CPT

## 2025-03-10 PROCEDURE — 96374 THER/PROPH/DIAG INJ IV PUSH: CPT

## 2025-03-10 PROCEDURE — 85025 COMPLETE CBC W/AUTO DIFF WBC: CPT | Performed by: EMERGENCY MEDICINE

## 2025-03-10 PROCEDURE — 25010000002 ONDANSETRON PER 1 MG: Performed by: EMERGENCY MEDICINE

## 2025-03-10 PROCEDURE — 36415 COLL VENOUS BLD VENIPUNCTURE: CPT

## 2025-03-10 PROCEDURE — 80053 COMPREHEN METABOLIC PANEL: CPT | Performed by: EMERGENCY MEDICINE

## 2025-03-10 PROCEDURE — 83690 ASSAY OF LIPASE: CPT | Performed by: EMERGENCY MEDICINE

## 2025-03-10 PROCEDURE — 74177 CT ABD & PELVIS W/CONTRAST: CPT

## 2025-03-10 PROCEDURE — 25510000001 IOPAMIDOL 61 % SOLUTION: Performed by: EMERGENCY MEDICINE

## 2025-03-10 PROCEDURE — 25010000002 DICYCLOMINE PER 20 MG: Performed by: EMERGENCY MEDICINE

## 2025-03-10 PROCEDURE — 99285 EMERGENCY DEPT VISIT HI MDM: CPT

## 2025-03-10 PROCEDURE — 25010000002 HYDROMORPHONE 1 MG/ML SOLUTION: Performed by: EMERGENCY MEDICINE

## 2025-03-10 PROCEDURE — 96375 TX/PRO/DX INJ NEW DRUG ADDON: CPT

## 2025-03-10 PROCEDURE — 25010000002 KETOROLAC TROMETHAMINE PER 15 MG: Performed by: EMERGENCY MEDICINE

## 2025-03-10 RX ORDER — ONDANSETRON 2 MG/ML
4 INJECTION INTRAMUSCULAR; INTRAVENOUS ONCE
Status: COMPLETED | OUTPATIENT
Start: 2025-03-10 | End: 2025-03-10

## 2025-03-10 RX ORDER — DICYCLOMINE HCL 20 MG
20 TABLET ORAL ONCE
Status: COMPLETED | OUTPATIENT
Start: 2025-03-10 | End: 2025-03-10

## 2025-03-10 RX ORDER — KETOROLAC TROMETHAMINE 15 MG/ML
15 INJECTION, SOLUTION INTRAMUSCULAR; INTRAVENOUS ONCE
Status: COMPLETED | OUTPATIENT
Start: 2025-03-10 | End: 2025-03-10

## 2025-03-10 RX ORDER — IOPAMIDOL 612 MG/ML
100 INJECTION, SOLUTION INTRAVASCULAR
Status: COMPLETED | OUTPATIENT
Start: 2025-03-10 | End: 2025-03-10

## 2025-03-10 RX ORDER — SODIUM CHLORIDE 0.9 % (FLUSH) 0.9 %
10 SYRINGE (ML) INJECTION AS NEEDED
Status: DISCONTINUED | OUTPATIENT
Start: 2025-03-10 | End: 2025-03-11 | Stop reason: HOSPADM

## 2025-03-10 RX ORDER — DICYCLOMINE HYDROCHLORIDE 10 MG/ML
20 INJECTION INTRAMUSCULAR ONCE
Status: COMPLETED | OUTPATIENT
Start: 2025-03-10 | End: 2025-03-10

## 2025-03-10 RX ADMIN — IOPAMIDOL 100 ML: 612 INJECTION, SOLUTION INTRAVENOUS at 18:32

## 2025-03-10 RX ADMIN — KETOROLAC TROMETHAMINE 15 MG: 15 INJECTION, SOLUTION INTRAMUSCULAR; INTRAVENOUS at 17:22

## 2025-03-10 RX ADMIN — DICYCLOMINE HYDROCHLORIDE 20 MG: 10 INJECTION, SOLUTION INTRAMUSCULAR at 17:05

## 2025-03-10 RX ADMIN — DICYCLOMINE HYDROCHLORIDE 20 MG: 20 TABLET ORAL at 19:58

## 2025-03-10 RX ADMIN — ONDANSETRON 4 MG: 2 INJECTION INTRAMUSCULAR; INTRAVENOUS at 19:58

## 2025-03-10 RX ADMIN — HYDROMORPHONE HYDROCHLORIDE 1 MG: 1 INJECTION, SOLUTION INTRAMUSCULAR; INTRAVENOUS; SUBCUTANEOUS at 19:58

## 2025-03-10 NOTE — ED PROVIDER NOTES
Subjective   History of Present Illness  Patient complains of generalized abdominal pain that he describes as a cramping and severe pain.  Is been going on for about 3 hours this morning.  He denies any vomiting but has had some nausea.  He denies any diarrhea and had a normal bowel movement yesterday.  Denies any fever or chills.  He says he has had this many times off and on over the years.  Has had multiple evaluations including CT scans and colonoscopies and endoscopies with no definitive findings.  He says even spent a week in [place] with testing and they could not give an answer.  He says his regular doctors in [place] but they told him the last time they saw him they could not have any else form so he came here.    History provided by:  Patient   used: No    Abdominal Pain  Pain location:  Generalized  Pain quality: cramping    Pain radiates to:  Does not radiate  Pain severity:  Severe  Onset quality:  Gradual  Duration:  3 hours  Timing:  Constant  Progression:  Unchanged  Chronicity:  Recurrent  Context: not alcohol use, not awakening from sleep, not diet changes, not eating, not laxative use, not medication withdrawal, not previous surgeries, not recent illness, not recent sexual activity, not recent travel, not retching, not sick contacts, not suspicious food intake and not trauma    Relieved by:  Nothing  Worsened by:  Nothing  Ineffective treatments:  None tried  Associated symptoms: nausea    Associated symptoms: no anorexia, no belching, no chest pain, no chills, no constipation, no cough, no diarrhea, no dysuria, no fatigue, no fever, no flatus, no hematemesis, no hematochezia, no hematuria, no melena, no shortness of breath, no sore throat, no vaginal bleeding, no vaginal discharge and no vomiting    Risk factors: no alcohol abuse, no aspirin use, not elderly, has not had multiple surgeries, no NSAID use, not obese, not pregnant and no recent hospitalization        Review of  "Systems   Constitutional: Negative.  Negative for chills, fatigue and fever.   HENT: Negative.  Negative for sore throat.    Respiratory: Negative.  Negative for cough and shortness of breath.    Cardiovascular: Negative.  Negative for chest pain.   Gastrointestinal:  Positive for abdominal pain and nausea. Negative for anorexia, constipation, diarrhea, flatus, hematemesis, hematochezia, melena and vomiting.   Genitourinary: Negative.  Negative for dysuria, hematuria, vaginal bleeding and vaginal discharge.   Musculoskeletal: Negative.    Skin: Negative.    Neurological: Negative.    Psychiatric/Behavioral: Negative.     All other systems reviewed and are negative.      Past Medical History:   Diagnosis Date    Allergy to alpha-gal     Arthritis     Chronic bilateral low back pain without sciatica 12/19/2022    Diabetes mellitus     Hearing loss, left     History of staph infection     Hypertension     Low back pain     Lumbosacral disc disease 12/19/2022    Psoriasis     Sleep apnea     Tinnitus     Vision disturbance     using rx eyedrops       Allergies   Allergen Reactions    Alpha-Gal Nausea And Vomiting and GI Intolerance     Red meat allergy    Pork-Derived Products Nausea And Vomiting and GI Intolerance    Penicillins Unknown - Low Severity     STATES HE \"HAS NO IDEA\" AND THAT IT WAS A CHILDHOOD ALLERGY       Past Surgical History:   Procedure Laterality Date    ANTERIOR LUMBAR EXPOSURE N/A 12/19/2022    Procedure: ANTERIOR LUMBAR EXPOSURE;  Surgeon: Simba Dyson DO;  Location:  [facility];  Service: Vascular;  Laterality: N/A;    BACK SURGERY      x 2    CHOLECYSTECTOMY      COLONOSCOPY      GANGLION CYST EXCISION Left     wrist    LIPOMA EXCISION      back    LUMBAR FUSION Left 12/21/2022    Procedure: LEFT LATERAL LUMBAR INTERBODY FUSION WITH INSTRUMENTATION L4-5;  Surgeon: CHARITO Garcia MD;  Location:  [facility];  Service: Orthopedic Spine;  Laterality: Left;    LUMBAR FUSION N/A 12/19/2022    " Procedure: ANTERIOR DECOMPRESSION, ANTERIOR LUMBAR INTERBODY FUSION WITH INSTRUMENTATION L5-S1;  Surgeon: CHARITO Garcia MD;  Location:  [facility];  Service: Orthopedic Spine;  Laterality: N/A;    LUMBAR FUSION Right 2024    Procedure: RIGHT LATERAL LUMBAR INTERBODY FUSION L2-4 WITH INSTRUMENTATION L2-3;  Surgeon: CHARITO Garcia MD;  Location:  [facility];  Service: Orthopedic Spine;  Laterality: Right;    LUMBAR LAMINECTOMY WITH FUSION N/A 2022    Procedure: POSTERIOR SPINAL FUSION WITH INSTRUMENTATION L4-S1;  Surgeon: CHARITO Garcia MD;  Location:  [facility];  Service: Orthopedic Spine;  Laterality: N/A;    LUMBAR LAMINECTOMY WITH FUSION N/A 2024    Procedure: PARTIAL REMOVAL OF INSTRUMENTATION, EXPLORATION OF FUSION L4-S1, POSTERIOR SPINAL FUSION L2-4 WITH INSTRUMENTATION L2-S1;  Surgeon: CHARITO Garcia MD;  Location:  [facility];  Service: Orthopedic Spine;  Laterality: N/A;       History reviewed. No pertinent family history.    Social History     Socioeconomic History    Marital status:    Tobacco Use    Smoking status: Former     Current packs/day: 0.00     Types: Cigarettes     Quit date:      Years since quittin.2    Smokeless tobacco: Never   Vaping Use    Vaping status: Never Used   Substance and Sexual Activity    Alcohol use: Yes     Comment: rarely    Drug use: Never    Sexual activity: Defer       Prior to Admission medications    Medication Sig Start Date End Date Taking? Authorizing Provider   aspirin 81 MG EC tablet Take 1 tablet by mouth Daily.    Robert Santiago MD   atorvastatin (LIPITOR) 20 MG tablet Take 1 tablet by mouth Daily.    Robert Santiago MD   cloNIDine (CATAPRES) 0.1 MG tablet Take 1 tablet by mouth 2 (Two) Times a Day. Last Fill date 90 days     Robert Santiago MD   etanercept (ENBREL) 50 MG/ML solution prefilled syringe injection Inject 1 mL under the skin into the appropriate area as directed 1 (One) Time Per  Week. Mondays    Robert Santiago MD   gabapentin (NEURONTIN) 300 MG capsule Take 1 capsule by mouth 3 (Three) Times a Day. Last filled November 2023 for 30 days    Robert Santiago MD   glipizide (GLUCOTROL XL) 5 MG ER tablet Take 1 tablet by mouth Daily.    Robert Santiago MD   hydroCHLOROthiazide (HYDRODIURIL) 12.5 MG tablet Take 1 tablet by mouth Daily.    Robert Santiago MD   losartan (COZAAR) 25 MG tablet Take 1 tablet by mouth Daily.    Robert Santiago MD   metFORMIN (GLUCOPHAGE) 1000 MG tablet Take 1 tablet by mouth 2 (Two) Times a Day With Meals.    Robert Santiago MD   polyethylene glycol (MIRALAX) 17 g packet Take 17 g by mouth Daily.    Robert Santiago MD       Medications   ketorolac (TORADOL) injection 15 mg (15 mg Intravenous Given 3/10/25 1722)   dicyclomine (BENTYL) injection 20 mg (20 mg Intramuscular Given 3/10/25 1705)   iopamidol (ISOVUE-300) 61 % injection 100 mL (100 mL Intravenous Given 3/10/25 1832)   HYDROmorphone (DILAUDID) injection 1 mg (1 mg Intravenous Given 3/10/25 1958)   ondansetron (ZOFRAN) injection 4 mg (4 mg Intravenous Given 3/10/25 1958)   dicyclomine (BENTYL) tablet 20 mg (20 mg Oral Given 3/10/25 1958)       Vitals:    03/10/25 2158   BP:    Pulse: 77   Resp:    Temp:    SpO2: 97%         Objective   Physical Exam  Vitals and nursing note reviewed.   Constitutional:       Appearance: He is well-developed.   HENT:      Head: Normocephalic and atraumatic.   Cardiovascular:      Rate and Rhythm: Normal rate and regular rhythm.   Pulmonary:      Effort: Pulmonary effort is normal.      Breath sounds: Normal breath sounds.   Abdominal:      General: Abdomen is flat. Bowel sounds are normal.      Palpations: Abdomen is soft.   Skin:     General: Skin is warm and dry.   Neurological:      General: No focal deficit present.      Mental Status: He is alert and oriented to person, place, and time.   Psychiatric:         Mood and Affect: Mood  normal.         Behavior: Behavior normal.         Procedures         Lab Results (last 24 hours)       ** No results found for the last 24 hours. **            CT Abdomen Pelvis With Contrast   Final Result   1. Normal bowel gas pattern with no obstruction or free air. There is a   oblong radiodensity in the proximal appendix suggesting an appendicolith   but without evidence of distention of the appendix or evidence of acute   appendicitis. No free fluid or localized inflammatory process   demonstrated.   2. Cortical cysts of the right kidney. No nephrolithiasis or obstructive   uropathy.   3. Mitral annulus calcifications are present. Coronary artery   calcifications are noted in the right coronary distribution.   4. Extensive postoperative changes of the lumbar spine.               This report was signed and finalized on 3/10/2025 6:49 PM by Dr. Stan Drake MD.              ED Course          MDM  Number of Diagnoses or Management Options  Generalized abdominal pain: new and requires workup  Diagnosis management comments: I told the patient that once again his workup is negative.  I stressed the fact he needs to follow-up with his family doctor about other treatment options.  He may have IBS though he first time he had not been given any clear diagnosis but then he has been tried on menses and is on Bentyl.  At any rate he should not be in the position where he has to drop him to ER assist to get some pain relief intermittently.  This to be managed by his regular doctors.  He says is already been told that [facility] that they will not give him anything else.  I told him that we will happen every ER he goes to if he goes up with any regularity.  At any rate we have got him some relief tonight.  He is discharged in stable condition.       Amount and/or Complexity of Data Reviewed  Clinical lab tests: ordered and reviewed  Tests in the radiology section of CPT®: ordered and reviewed    Risk of  Complications, Morbidity, and/or Mortality  Presenting problems: moderate  Diagnostic procedures: moderate  Management options: moderate    Patient Progress  Patient progress: stable        Final diagnoses:   Generalized abdominal pain          Callum Hinds Jr., MD  03/12/25 5829

## (undated) DEVICE — LP VESL MAXI 2.5X1MM RED 2PK

## (undated) DEVICE — BAPTIST TURNOVER KIT: Brand: MEDLINE INDUSTRIES, INC.

## (undated) DEVICE — DRP C/ARMOR

## (undated) DEVICE — SPK10277 JACKSON/PRO-AXIS KIT: Brand: SPK10277 JACKSON/PRO-AXIS KIT

## (undated) DEVICE — GLV SURG GRN DERMASSURE LF PF 7.5

## (undated) DEVICE — SUT MNCRYL 4/0 PS2 27IN UD MCP426H

## (undated) DEVICE — ANTIBACTERIAL UNDYED BRAIDED (POLYGLACTIN 910), SYNTHETIC ABSORBABLE SUTURE: Brand: COATED VICRYL

## (undated) DEVICE — Device

## (undated) DEVICE — TOTAL TRAY, 16FR 10ML SIL FOLEY, URN: Brand: MEDLINE

## (undated) DEVICE — SUT SILK 2/0 SUTUPAK TIES 24IN SA75H

## (undated) DEVICE — DRAPE,UTILITY,TAPE,15X26,STERILE: Brand: MEDLINE

## (undated) DEVICE — GLV SURG SENSICARE W/ALOE PF LF 7.5 STRL

## (undated) DEVICE — KT TBL OSI PRO AXIS JACKSN TORSO W/ PRONESAFE

## (undated) DEVICE — APPL CHLORAPREP HI/LITE 26ML ORNG

## (undated) DEVICE — CVR UNIV C/ARM

## (undated) DEVICE — DRSNG SURESITE WNDW 4X4.5

## (undated) DEVICE — SUT SILK 3/0 SUTUPAK TIES 24IN SA74H

## (undated) DEVICE — ELECTRD BLD EZ CLN MOD XLNG 2.75IN

## (undated) DEVICE — CATH IV ANGIO FEP 12G 3IN LTBLU 10PK

## (undated) DEVICE — DRSNG TELFA PAD NONADH STR 1S 3X8IN

## (undated) DEVICE — SUT SILK 2/0 SH 75CM 30IN BLK C016D

## (undated) DEVICE — SAFEOP STIMULATING BALL-TIP PROBE, STERILE: Brand: SAFEOP

## (undated) DEVICE — PACK,SET UP,NO DRAPES: Brand: MEDLINE

## (undated) DEVICE — KT INST SPINE LLIF/XLIF MAXCESS SURG/ACC 1/PU DISP

## (undated) DEVICE — BIPOLAR SEALER 23-113-1 AQM 2.3: Brand: AQUAMANTYS™

## (undated) DEVICE — PK SPINE POST 30

## (undated) DEVICE — SUT SILK 4/0 SUTUPAK TIES 24IN SA73H

## (undated) DEVICE — SCANLAN® SURG-I-PAW® INSTRUMENT COVERS - RED, 1/10" X 5"/ 3 MMX13 CM (2 - 5" PCS /PKG): Brand: SCANLAN® SURG-I-PAW® INSTRUMENT COVERS

## (undated) DEVICE — GLV SURG BIOGEL LTX PF 7 1/2

## (undated) DEVICE — TP SXN YANKR OPNTP

## (undated) DEVICE — TROCAR TIP NITINOL GUIDEWIRE 18": Brand: INVICTUS

## (undated) DEVICE — SPNG GZ WOVN 4X4IN 12PLY 10/BX STRL

## (undated) DEVICE — SPONGE,LAP,12"X12",XR,ST,5/PK,40PK/CS: Brand: MEDLINE

## (undated) DEVICE — SPONGE,DISSECTOR,K,XRAY,9/16"X1/4",STRL: Brand: MEDLINE

## (undated) DEVICE — BUR NEURO ELITE PRECISION 3X3.8MM

## (undated) DEVICE — ELECTRD BLD EZ CLN STD 6.5IN

## (undated) DEVICE — SUT SILK 0 SUTUPAK TIES 24IN SA76G

## (undated) DEVICE — GLV SURG DERMASSURE GRN LF PF 8.0

## (undated) DEVICE — PK SPINE LAT 30

## (undated) DEVICE — SUREFIT, DUAL DISPERSIVE ELECTRODE, CONTACT QUALITY MONITOR: Brand: SUREFIT

## (undated) DEVICE — MANIFLD WAST MGMT SYS NEPTUNE2 4PT

## (undated) DEVICE — NDL TP BVL JAMSHIDI ACC GUIDE ARCUS

## (undated) DEVICE — ARCUS STIMULATING TARGETING NEEDLE, BEVEL TIP: Brand: ARCUS

## (undated) DEVICE — GLV SURG BIOGEL LTX PF 6 1/2

## (undated) DEVICE — DRP SURG UTIL W/TP 15X26IN STRL

## (undated) DEVICE — CLTH CLENS READYCLEANSE PERI CARE PK/5

## (undated) DEVICE — 4-PORT MANIFOLD: Brand: NEPTUNE 2

## (undated) DEVICE — TP SILK DURAPORE 3IN

## (undated) DEVICE — SUT PROLN 5/0 C1 DA 24IN 8725H

## (undated) DEVICE — YANKAUER SUCTION INSTRUMENT WITHOUT CONTROL VENT, OPEN TIP, CLEAR: Brand: YANKAUER

## (undated) DEVICE — SUT PDS 0 CTX 36IN VIO PDP370T

## (undated) DEVICE — DILATOR NEUROVISION XLIF M5 KT 6 9 12

## (undated) DEVICE — GOWN,NON-REINFORCED,SIRUS,SET IN SLV,XL: Brand: MEDLINE

## (undated) DEVICE — SAFEOP EMG & SSEP NEEDLE ELECTRODE KIT: Brand: SAFEOP

## (undated) DEVICE — TRAP FLD MINIVAC MEGADYNE 100ML